# Patient Record
Sex: FEMALE | Race: WHITE | Employment: OTHER | URBAN - NONMETROPOLITAN AREA
[De-identification: names, ages, dates, MRNs, and addresses within clinical notes are randomized per-mention and may not be internally consistent; named-entity substitution may affect disease eponyms.]

---

## 2017-06-03 PROBLEM — I48.91 ATRIAL FIBRILLATION (HCC): Status: ACTIVE | Noted: 2017-06-03

## 2017-06-03 PROBLEM — I10 ESSENTIAL HYPERTENSION: Status: ACTIVE | Noted: 2017-06-03

## 2017-06-03 PROBLEM — E78.5 HLD (HYPERLIPIDEMIA): Status: ACTIVE | Noted: 2017-06-03

## 2017-06-03 PROBLEM — E03.9 HYPOTHYROIDISM: Status: ACTIVE | Noted: 2017-06-03

## 2017-06-03 PROBLEM — R07.9 CHEST PAIN: Status: ACTIVE | Noted: 2017-06-03

## 2017-06-04 PROBLEM — I48.19 PERSISTENT ATRIAL FIBRILLATION (HCC): Status: ACTIVE | Noted: 2017-06-03

## 2017-06-22 PROBLEM — E78.5 HLD (HYPERLIPIDEMIA): Chronic | Status: ACTIVE | Noted: 2017-06-03

## 2017-06-22 PROBLEM — I10 ESSENTIAL HYPERTENSION: Chronic | Status: ACTIVE | Noted: 2017-06-03

## 2017-06-22 PROBLEM — I48.19 PERSISTENT ATRIAL FIBRILLATION (HCC): Chronic | Status: ACTIVE | Noted: 2017-06-03

## 2018-07-19 ENCOUNTER — OFFICE VISIT (OUTPATIENT)
Dept: CARDIOLOGY CLINIC | Age: 83
End: 2018-07-19
Payer: MEDICARE

## 2018-07-19 VITALS
WEIGHT: 174 LBS | HEART RATE: 84 BPM | BODY MASS INDEX: 27.31 KG/M2 | HEIGHT: 67 IN | RESPIRATION RATE: 16 BRPM | DIASTOLIC BLOOD PRESSURE: 78 MMHG | SYSTOLIC BLOOD PRESSURE: 118 MMHG

## 2018-07-19 DIAGNOSIS — I50.33 ACUTE ON CHRONIC DIASTOLIC CONGESTIVE HEART FAILURE (HCC): Primary | ICD-10-CM

## 2018-07-19 DIAGNOSIS — I48.19 PERSISTENT ATRIAL FIBRILLATION (HCC): Chronic | ICD-10-CM

## 2018-07-19 PROCEDURE — 4040F PNEUMOC VAC/ADMIN/RCVD: CPT | Performed by: INTERNAL MEDICINE

## 2018-07-19 PROCEDURE — 93000 ELECTROCARDIOGRAM COMPLETE: CPT | Performed by: INTERNAL MEDICINE

## 2018-07-19 PROCEDURE — 1101F PT FALLS ASSESS-DOCD LE1/YR: CPT | Performed by: INTERNAL MEDICINE

## 2018-07-19 PROCEDURE — 1036F TOBACCO NON-USER: CPT | Performed by: INTERNAL MEDICINE

## 2018-07-19 PROCEDURE — G8427 DOCREV CUR MEDS BY ELIG CLIN: HCPCS | Performed by: INTERNAL MEDICINE

## 2018-07-19 PROCEDURE — G8417 CALC BMI ABV UP PARAM F/U: HCPCS | Performed by: INTERNAL MEDICINE

## 2018-07-19 PROCEDURE — 1123F ACP DISCUSS/DSCN MKR DOCD: CPT | Performed by: INTERNAL MEDICINE

## 2018-07-19 PROCEDURE — 99214 OFFICE O/P EST MOD 30 MIN: CPT | Performed by: INTERNAL MEDICINE

## 2018-07-19 PROCEDURE — G8400 PT W/DXA NO RESULTS DOC: HCPCS | Performed by: INTERNAL MEDICINE

## 2018-07-19 PROCEDURE — 1090F PRES/ABSN URINE INCON ASSESS: CPT | Performed by: INTERNAL MEDICINE

## 2018-07-19 RX ORDER — LORAZEPAM 0.5 MG/1
TABLET ORAL
Refills: 3 | COMMUNITY
Start: 2018-06-25 | End: 2018-07-19

## 2018-07-19 RX ORDER — SPIRONOLACTONE 25 MG/1
25 TABLET ORAL DAILY
Qty: 90 TABLET | Refills: 3
Start: 2018-07-19 | End: 2019-06-20 | Stop reason: ALTCHOICE

## 2018-07-19 NOTE — PROGRESS NOTES
Atrial fibrillation ([facility]     Hyperlipidemia     Hypertension     Hypothyroidism        Patient Active Problem List   Diagnosis    Chest pain    Persistent atrial fibrillation ([facility]    Essential hypertension    HLD (hyperlipidemia)    Hypothyroidism    Acute on chronic diastolic heart failure (HCC)    Pure hypercholesterolemia    Hyperthyroidism    Paced rhythm on electrocardiogram (ECG)       Allergies   Allergen Reactions    Amiodarone Other (See Comments)     Causes issues with liver enzymes. Current Outpatient Prescriptions   Medication Sig Dispense Refill    metoprolol succinate (TOPROL XL) 50 MG extended release tablet TAKE ONE TABLET BY MOUTH TWO TIMES A  tablet 0    levothyroxine (SYNTHROID) 125 MCG tablet Take 125 mcg by mouth Daily      diltiazem (CARDIZEM CD) 360 MG extended release capsule Take 1 capsule by mouth daily 90 capsule 3    apixaban (ELIQUIS) 5 MG TABS tablet Take 1 tablet by mouth 2 times daily 180 tablet 3    clopidogrel (PLAVIX) 75 MG tablet Take 1 tablet by mouth daily 90 tablet 3    pravastatin (PRAVACHOL) 40 MG tablet Take 1 tablet by mouth daily 90 tablet 3    furosemide (LASIX) 20 MG tablet Take 1 tablet by mouth daily 60 tablet 3    vitamin D (CHOLECALCIFEROL) 400 UNITS TABS tablet Take 4,000 Units by mouth daily       LORazepam (ATIVAN) 0.5 MG tablet TAKE ONE TABLET BY MOUTH DAILY AS NEEDED  3     No current facility-administered medications for this visit. Social History     Social History    Marital status:      Spouse name: N/A    Number of children: N/A    Years of education: N/A     Occupational History    Not on file. Social History Main Topics    Smoking status: Never Smoker    Smokeless tobacco: Never Used    Alcohol use No    Drug use: No    Sexual activity: Not on file     Other Topics Concern    Not on file     Social History Narrative    No narrative on file       History reviewed.  No pertinent family

## 2018-07-20 RX ORDER — FUROSEMIDE 20 MG/1
20 TABLET ORAL DAILY
Qty: 90 TABLET | Refills: 3 | Status: SHIPPED | OUTPATIENT
Start: 2018-07-20 | End: 2018-12-20 | Stop reason: SDUPTHER

## 2018-07-20 RX ORDER — METOPROLOL SUCCINATE 50 MG/1
50 TABLET, EXTENDED RELEASE ORAL 2 TIMES DAILY
Qty: 180 TABLET | Refills: 3 | Status: SHIPPED | OUTPATIENT
Start: 2018-07-20 | End: 2018-12-20 | Stop reason: DRUGHIGH

## 2018-07-20 RX ORDER — DILTIAZEM HYDROCHLORIDE 360 MG/1
360 CAPSULE, EXTENDED RELEASE ORAL DAILY
Qty: 90 CAPSULE | Refills: 3 | Status: SHIPPED | OUTPATIENT
Start: 2018-07-20 | End: 2019-06-20 | Stop reason: ALTCHOICE

## 2018-07-20 RX ORDER — PRAVASTATIN SODIUM 40 MG
40 TABLET ORAL DAILY
Qty: 90 TABLET | Refills: 3 | Status: SHIPPED | OUTPATIENT
Start: 2018-07-20 | End: 2019-07-20 | Stop reason: SDUPTHER

## 2018-09-18 ENCOUNTER — OFFICE VISIT (OUTPATIENT)
Dept: CARDIOLOGY CLINIC | Age: 83
End: 2018-09-18
Payer: MEDICARE

## 2018-09-18 VITALS
WEIGHT: 175 LBS | HEIGHT: 67 IN | BODY MASS INDEX: 27.47 KG/M2 | DIASTOLIC BLOOD PRESSURE: 78 MMHG | HEART RATE: 90 BPM | SYSTOLIC BLOOD PRESSURE: 118 MMHG

## 2018-09-18 DIAGNOSIS — I48.19 PERSISTENT ATRIAL FIBRILLATION (HCC): Primary | Chronic | ICD-10-CM

## 2018-09-18 PROCEDURE — 93000 ELECTROCARDIOGRAM COMPLETE: CPT | Performed by: INTERNAL MEDICINE

## 2018-09-18 PROCEDURE — 1101F PT FALLS ASSESS-DOCD LE1/YR: CPT | Performed by: INTERNAL MEDICINE

## 2018-09-18 PROCEDURE — 1036F TOBACCO NON-USER: CPT | Performed by: INTERNAL MEDICINE

## 2018-09-18 PROCEDURE — G8428 CUR MEDS NOT DOCUMENT: HCPCS | Performed by: INTERNAL MEDICINE

## 2018-09-18 PROCEDURE — G8400 PT W/DXA NO RESULTS DOC: HCPCS | Performed by: INTERNAL MEDICINE

## 2018-09-18 PROCEDURE — 4040F PNEUMOC VAC/ADMIN/RCVD: CPT | Performed by: INTERNAL MEDICINE

## 2018-09-18 PROCEDURE — G8417 CALC BMI ABV UP PARAM F/U: HCPCS | Performed by: INTERNAL MEDICINE

## 2018-09-18 PROCEDURE — 1090F PRES/ABSN URINE INCON ASSESS: CPT | Performed by: INTERNAL MEDICINE

## 2018-09-18 PROCEDURE — 99214 OFFICE O/P EST MOD 30 MIN: CPT | Performed by: INTERNAL MEDICINE

## 2018-09-18 PROCEDURE — 1123F ACP DISCUSS/DSCN MKR DOCD: CPT | Performed by: INTERNAL MEDICINE

## 2018-09-18 RX ORDER — LORAZEPAM 0.5 MG/1
TABLET ORAL
Refills: 3 | COMMUNITY
Start: 2018-08-31 | End: 2018-09-18

## 2018-09-18 NOTE — PROGRESS NOTES
history. Review of Systems:  Heart: as above   Lungs: as above   Eyes: denies changes in vision or discharge. Ears: denies changes in hearing or pain. Nose: denies epistaxis or masses   Throat: denies sore throat or trouble swallowing. Neuro: denies numbness, tingling, tremors. Skin: denies rashes or itching. : denies hematuria, dysuria   GI: denies vomiting, diarrhea   Psych: denies mood changed, anxiety, depression. All other systems negative. Physical Exam   /78   Pulse 90   Ht 5' 7\" (1.702 m)   Wt 175 lb (79.4 kg)   BMI 27.41 kg/m²   Constitutional: Oriented to person, place, and time. Well-developed and well-nourished. No distress. Head: Normocephalic and atraumatic. Eyes: EOM are normal. Pupils are equal, round, and reactive to light. Neck: Normal range of motion. Neck supple. No hepatojugular reflux and no JVD present. Carotid bruit is not present. No tracheal deviation present. No thyromegaly present. Cardiovascular: Normal rate, irregular rhythm, normal heart sounds and intact distal pulses. Exam reveals no gallop and no friction rub. No murmur heard. Pulmonary/Chest: Effort normal and breath sounds normal. No respiratory distress. No wheezes. No rales. No tenderness. Abdominal: Soft. Bowel sounds are normal. No distension and no mass. No tenderness. No rebound and no guarding. Musculoskeletal: Normal range of motion. No edema and no tenderness. Lymphadenopathy:   No cervical adenopathy. No groin adenopathy. Neurological: Alert and oriented to person, place, and time. Skin: Skin is warm and dry. No rash noted. Not diaphoretic. No erythema. Psychiatric: Normal mood and affect. Behavior is normal.     EKG:  atrial fibrillation, V paced. Cath Findings 6/26/17:   Left main: 0% stenosis  LAD: mild, calcific disease  Circumflex: mild disease   RCA: Dominant. 80 % mid stenosis. Hemodynamics: Ao: 115/63  Interventional procedure:   1. PCI vessel: RCA.  Lesion

## 2018-12-18 ENCOUNTER — TELEPHONE (OUTPATIENT)
Dept: CARDIOLOGY CLINIC | Age: 83
End: 2018-12-18

## 2018-12-20 ENCOUNTER — OFFICE VISIT (OUTPATIENT)
Dept: CARDIOLOGY CLINIC | Age: 83
End: 2018-12-20
Payer: MEDICARE

## 2018-12-20 VITALS
BODY MASS INDEX: 26.67 KG/M2 | RESPIRATION RATE: 20 BRPM | SYSTOLIC BLOOD PRESSURE: 118 MMHG | WEIGHT: 176 LBS | HEART RATE: 84 BPM | DIASTOLIC BLOOD PRESSURE: 72 MMHG | HEIGHT: 68 IN

## 2018-12-20 DIAGNOSIS — I10 ESSENTIAL HYPERTENSION: Chronic | ICD-10-CM

## 2018-12-20 DIAGNOSIS — I48.19 PERSISTENT ATRIAL FIBRILLATION (HCC): Primary | Chronic | ICD-10-CM

## 2018-12-20 PROCEDURE — G8417 CALC BMI ABV UP PARAM F/U: HCPCS | Performed by: INTERNAL MEDICINE

## 2018-12-20 PROCEDURE — G8484 FLU IMMUNIZE NO ADMIN: HCPCS | Performed by: INTERNAL MEDICINE

## 2018-12-20 PROCEDURE — G8427 DOCREV CUR MEDS BY ELIG CLIN: HCPCS | Performed by: INTERNAL MEDICINE

## 2018-12-20 PROCEDURE — 1101F PT FALLS ASSESS-DOCD LE1/YR: CPT | Performed by: INTERNAL MEDICINE

## 2018-12-20 PROCEDURE — G8400 PT W/DXA NO RESULTS DOC: HCPCS | Performed by: INTERNAL MEDICINE

## 2018-12-20 PROCEDURE — 1123F ACP DISCUSS/DSCN MKR DOCD: CPT | Performed by: INTERNAL MEDICINE

## 2018-12-20 PROCEDURE — 93000 ELECTROCARDIOGRAM COMPLETE: CPT | Performed by: INTERNAL MEDICINE

## 2018-12-20 PROCEDURE — 1090F PRES/ABSN URINE INCON ASSESS: CPT | Performed by: INTERNAL MEDICINE

## 2018-12-20 PROCEDURE — 1036F TOBACCO NON-USER: CPT | Performed by: INTERNAL MEDICINE

## 2018-12-20 PROCEDURE — 99214 OFFICE O/P EST MOD 30 MIN: CPT | Performed by: INTERNAL MEDICINE

## 2018-12-20 PROCEDURE — 4040F PNEUMOC VAC/ADMIN/RCVD: CPT | Performed by: INTERNAL MEDICINE

## 2018-12-20 RX ORDER — FUROSEMIDE 20 MG/1
40 TABLET ORAL DAILY
Qty: 180 TABLET | Refills: 3 | Status: SHIPPED | OUTPATIENT
Start: 2018-12-20 | End: 2020-03-03 | Stop reason: SDUPTHER

## 2018-12-20 RX ORDER — METOPROLOL SUCCINATE 50 MG/1
50 TABLET, EXTENDED RELEASE ORAL 3 TIMES DAILY
Qty: 270 TABLET | Refills: 3 | Status: SHIPPED | OUTPATIENT
Start: 2018-12-20 | End: 2018-12-27 | Stop reason: SDUPTHER

## 2018-12-20 NOTE — PATIENT INSTRUCTIONS
1. Increase furosemide to two pills daily. 2. Increase metoprolol XL to one pill three times daily. 3. Get blood work done in about a week.   4. We will arrange for you to see an irregular heart beat specialist.

## 2018-12-21 ENCOUNTER — TELEPHONE (OUTPATIENT)
Dept: ADMINISTRATIVE | Age: 83
End: 2018-12-21

## 2018-12-21 NOTE — PROGRESS NOTES
7. Pharmacologic stress test, 06/03/2017. Ejection fraction 77%. No evidence for stress induced left ventricular myocardial ischemia. 8. Mild bilateral carotid vascular disease by ultrasound, 06/24/2014 (less than 50% internal carotid artery stenoses bilaterally). 9. [id number] score  = 4. Review of Systems:  HEENT: negative for acute visual and auditory problems  Constitutional: Patient does feel poorly, but denies palpitations or syncope. Please note the description above. Respiratory: negative for cough and hemoptysis  Cardiovascular: negative for chest pain and dyspnea  Gastrointestinal: negative for abdominal pain, diarrhea, nausea and vomiting  Genitourinary: negative for dysuria and hematuria  Derm: negative for rash and skin lesion(s)  Neurological: negative for seizures and tremors  Endocrine: negative for diabetic symptoms including polydipsia and polyuria  Musculoskeletal: negative for CTD  Psychiatric: negative for anxiety and major depression. The remainder of the review of systems is negative except as noted above. On exam today, she is an overweight white female who is awake, alert and oriented. P: 84 and irregularly irregular. BP: 118/74. Wt. 176 lbs. , which is stable from 09/2018. Oxygen saturation on room air is 97%. Her neck is supple. She has jugular venous distention with hepatojugular reflux when sitting at a 45 degree angle. Carotids are full. Respirations are unlabored. Her chest is clear. She does have a few rales at the bases. She has no presacral edema. Her heart has an irregularly irregular rhythm. There is no murmur or gallop. Her abdomen is obese, but otherwise benign. Extremities showed 2+ pitting edema bilaterally and she is wearing support stockings. Her electrocardiogram shows atrial fibrillation with an occasional paced beat. She has poor precordial R wave progression. No acute changes seen.      Her atrial fibrillation appears to be

## 2018-12-27 ENCOUNTER — TELEPHONE (OUTPATIENT)
Dept: CARDIOLOGY CLINIC | Age: 83
End: 2018-12-27

## 2018-12-27 DIAGNOSIS — I48.19 PERSISTENT ATRIAL FIBRILLATION (HCC): Chronic | ICD-10-CM

## 2018-12-27 LAB
BUN BLDV-MCNC: 29 MG/DL (ref 7–24)
CALCIUM SERPL-MCNC: 9 MG/DL (ref 8.5–10.5)
CHLORIDE BLD-SCNC: 101 MMOL/L (ref 98–107)
CO2: 30 MMOL/L (ref 21–32)
CREAT SERPL-MCNC: 1.76 MG/DL (ref 0.55–1.02)
GFR AFRICAN AMERICAN: 34 ML/MIN
GFR SERPL CREATININE-BSD FRML MDRD: 28 ML/MIN/
GLUCOSE: 111 MG/DL (ref 65–99)
POTASSIUM SERPL-SCNC: 3.6 MMOL/L (ref 3.5–5.1)
SODIUM BLD-SCNC: 140 MMOL/L (ref 136–145)

## 2018-12-27 RX ORDER — METOPROLOL SUCCINATE 100 MG/1
100 TABLET, EXTENDED RELEASE ORAL 2 TIMES DAILY
Qty: 180 TABLET | Refills: 3 | Status: SHIPPED | OUTPATIENT
Start: 2018-12-27 | End: 2019-08-07 | Stop reason: SDUPTHER

## 2018-12-27 NOTE — TELEPHONE ENCOUNTER
I talked to Stephanie Cruz's daughter and she said Hayde Hare said she is not feeling any better. I was not able to get her an appt at  [facility] until [facility]. Do you think she can wait till then or should I call back and talk to actual office not the scheduling office.

## 2019-02-07 ENCOUNTER — TELEPHONE (OUTPATIENT)
Dept: NON INVASIVE DIAGNOSTICS | Age: 84
End: 2019-02-07

## 2019-02-07 ENCOUNTER — OFFICE VISIT (OUTPATIENT)
Dept: NON INVASIVE DIAGNOSTICS | Age: 84
End: 2019-02-07
Payer: MEDICARE

## 2019-02-07 VITALS
WEIGHT: 175 LBS | RESPIRATION RATE: 18 BRPM | HEIGHT: 64 IN | DIASTOLIC BLOOD PRESSURE: 60 MMHG | SYSTOLIC BLOOD PRESSURE: 116 MMHG | HEART RATE: 85 BPM | BODY MASS INDEX: 29.88 KG/M2

## 2019-02-07 DIAGNOSIS — Z95.0 PACEMAKER: Primary | ICD-10-CM

## 2019-02-07 DIAGNOSIS — R06.02 SOB (SHORTNESS OF BREATH): ICD-10-CM

## 2019-02-07 DIAGNOSIS — I50.33 ACUTE ON CHRONIC DIASTOLIC HEART FAILURE (HCC): ICD-10-CM

## 2019-02-07 PROCEDURE — G8484 FLU IMMUNIZE NO ADMIN: HCPCS | Performed by: INTERNAL MEDICINE

## 2019-02-07 PROCEDURE — 99205 OFFICE O/P NEW HI 60 MIN: CPT | Performed by: INTERNAL MEDICINE

## 2019-02-07 PROCEDURE — 93280 PM DEVICE PROGR EVAL DUAL: CPT | Performed by: INTERNAL MEDICINE

## 2019-02-07 PROCEDURE — 1101F PT FALLS ASSESS-DOCD LE1/YR: CPT | Performed by: INTERNAL MEDICINE

## 2019-02-07 PROCEDURE — 1123F ACP DISCUSS/DSCN MKR DOCD: CPT | Performed by: INTERNAL MEDICINE

## 2019-02-07 PROCEDURE — 1036F TOBACCO NON-USER: CPT | Performed by: INTERNAL MEDICINE

## 2019-02-07 PROCEDURE — G8417 CALC BMI ABV UP PARAM F/U: HCPCS | Performed by: INTERNAL MEDICINE

## 2019-02-07 PROCEDURE — G8400 PT W/DXA NO RESULTS DOC: HCPCS | Performed by: INTERNAL MEDICINE

## 2019-02-07 PROCEDURE — 4040F PNEUMOC VAC/ADMIN/RCVD: CPT | Performed by: INTERNAL MEDICINE

## 2019-02-07 PROCEDURE — G8427 DOCREV CUR MEDS BY ELIG CLIN: HCPCS | Performed by: INTERNAL MEDICINE

## 2019-02-07 PROCEDURE — 1090F PRES/ABSN URINE INCON ASSESS: CPT | Performed by: INTERNAL MEDICINE

## 2019-02-07 RX ORDER — LORAZEPAM 0.5 MG/1
TABLET ORAL
Refills: 3 | COMMUNITY
Start: 2019-01-10

## 2019-02-18 ENCOUNTER — TELEPHONE (OUTPATIENT)
Dept: NON INVASIVE DIAGNOSTICS | Age: 84
End: 2019-02-18

## 2019-03-11 ENCOUNTER — HOSPITAL ENCOUNTER (OUTPATIENT)
Dept: GENERAL RADIOLOGY | Age: 84
Discharge: HOME | End: 2019-03-13
Attending: INTERNAL MEDICINE
Payer: MEDICARE

## 2019-03-11 ENCOUNTER — ANESTHESIA (OUTPATIENT)
Dept: CARDIAC CATH/INVASIVE PROCEDURES | Age: 84
End: 2019-03-11

## 2019-03-11 ENCOUNTER — HOSPITAL ENCOUNTER (OUTPATIENT)
Dept: CARDIAC CATH/INVASIVE PROCEDURES | Age: 84
Setting detail: OBSERVATION
Discharge: HOME | End: 2019-03-12
Attending: INTERNAL MEDICINE | Admitting: INTERNAL MEDICINE
Payer: MEDICARE

## 2019-03-11 ENCOUNTER — ANESTHESIA EVENT (OUTPATIENT)
Dept: CARDIAC CATH/INVASIVE PROCEDURES | Age: 84
End: 2019-03-11

## 2019-03-11 VITALS
SYSTOLIC BLOOD PRESSURE: 96 MMHG | OXYGEN SATURATION: 98 % | DIASTOLIC BLOOD PRESSURE: 64 MMHG | RESPIRATION RATE: 23 BRPM

## 2019-03-11 DIAGNOSIS — Z95.0 PACEMAKER: ICD-10-CM

## 2019-03-11 LAB
ANION GAP SERPL CALCULATED.3IONS-SCNC: 12 MMOL/L (ref 7–16)
BASOPHILS ABSOLUTE: 0.13 E9/L (ref 0–0.2)
BASOPHILS RELATIVE PERCENT: 1 % (ref 0–2)
BUN BLDV-MCNC: 28 MG/DL (ref 8–23)
CALCIUM SERPL-MCNC: 9.6 MG/DL (ref 8.6–10.2)
CHLORIDE BLD-SCNC: 99 MMOL/L (ref 98–107)
CO2: 27 MMOL/L (ref 22–29)
CREAT SERPL-MCNC: 1.6 MG/DL (ref 0.5–1)
EOSINOPHILS ABSOLUTE: 0.92 E9/L (ref 0.05–0.5)
EOSINOPHILS RELATIVE PERCENT: 7.2 % (ref 0–6)
GFR AFRICAN AMERICAN: 37
GFR NON-AFRICAN AMERICAN: 31 ML/MIN/1.73
GLUCOSE BLD-MCNC: 100 MG/DL (ref 74–99)
HCT VFR BLD CALC: 42.8 % (ref 34–48)
HEMOGLOBIN: 14.7 G/DL (ref 11.5–15.5)
IMMATURE GRANULOCYTES #: 0.17 E9/L
IMMATURE GRANULOCYTES %: 1.3 % (ref 0–5)
LYMPHOCYTES ABSOLUTE: 2.07 E9/L (ref 1.5–4)
LYMPHOCYTES RELATIVE PERCENT: 16.3 % (ref 20–42)
MCH RBC QN AUTO: 35.1 PG (ref 26–35)
MCHC RBC AUTO-ENTMCNC: 34.3 % (ref 32–34.5)
MCV RBC AUTO: 102.1 FL (ref 80–99.9)
MONOCYTES ABSOLUTE: 1.18 E9/L (ref 0.1–0.95)
MONOCYTES RELATIVE PERCENT: 9.3 % (ref 2–12)
NEUTROPHILS ABSOLUTE: 8.24 E9/L (ref 1.8–7.3)
NEUTROPHILS RELATIVE PERCENT: 64.9 % (ref 43–80)
PDW BLD-RTO: 12.5 FL (ref 11.5–15)
PLATELET # BLD: 258 E9/L (ref 130–450)
PMV BLD AUTO: 10.1 FL (ref 7–12)
POTASSIUM SERPL-SCNC: 3.7 MMOL/L (ref 3.5–5)
RBC # BLD: 4.19 E12/L (ref 3.5–5.5)
SODIUM BLD-SCNC: 138 MMOL/L (ref 132–146)
WBC # BLD: 12.7 E9/L (ref 4.5–11.5)

## 2019-03-11 PROCEDURE — C1769 GUIDE WIRE: HCPCS

## 2019-03-11 PROCEDURE — 33229 REMV&REPLC PM GEN MULT LEADS: CPT | Performed by: INTERNAL MEDICINE

## 2019-03-11 PROCEDURE — 6360000002 HC RX W HCPCS: Performed by: NURSE ANESTHETIST, CERTIFIED REGISTERED

## 2019-03-11 PROCEDURE — 33225 L VENTRIC PACING LEAD ADD-ON: CPT | Performed by: INTERNAL MEDICINE

## 2019-03-11 PROCEDURE — G0378 HOSPITAL OBSERVATION PER HR: HCPCS

## 2019-03-11 PROCEDURE — 3700000001 HC ADD 15 MINUTES (ANESTHESIA)

## 2019-03-11 PROCEDURE — C1900 LEAD, CORONARY VENOUS: HCPCS

## 2019-03-11 PROCEDURE — 2580000003 HC RX 258

## 2019-03-11 PROCEDURE — 2580000003 HC RX 258: Performed by: INTERNAL MEDICINE

## 2019-03-11 PROCEDURE — 6360000002 HC RX W HCPCS: Performed by: INTERNAL MEDICINE

## 2019-03-11 PROCEDURE — 2580000003 HC RX 258: Performed by: NURSE ANESTHETIST, CERTIFIED REGISTERED

## 2019-03-11 PROCEDURE — C1781 MESH (IMPLANTABLE): HCPCS

## 2019-03-11 PROCEDURE — 6360000002 HC RX W HCPCS

## 2019-03-11 PROCEDURE — 36415 COLL VENOUS BLD VENIPUNCTURE: CPT

## 2019-03-11 PROCEDURE — C1893 INTRO/SHEATH, FIXED,NON-PEEL: HCPCS

## 2019-03-11 PROCEDURE — C1730 CATH, EP, 19 OR FEW ELECT: HCPCS

## 2019-03-11 PROCEDURE — C1894 INTRO/SHEATH, NON-LASER: HCPCS

## 2019-03-11 PROCEDURE — C1725 CATH, TRANSLUMIN NON-LASER: HCPCS

## 2019-03-11 PROCEDURE — 2709999900 HC NON-CHARGEABLE SUPPLY

## 2019-03-11 PROCEDURE — 71045 X-RAY EXAM CHEST 1 VIEW: CPT

## 2019-03-11 PROCEDURE — 3700000000 HC ANESTHESIA ATTENDED CARE

## 2019-03-11 PROCEDURE — 6370000000 HC RX 637 (ALT 250 FOR IP): Performed by: INTERNAL MEDICINE

## 2019-03-11 PROCEDURE — 85025 COMPLETE CBC W/AUTO DIFF WBC: CPT

## 2019-03-11 PROCEDURE — 2500000003 HC RX 250 WO HCPCS

## 2019-03-11 PROCEDURE — 93005 ELECTROCARDIOGRAM TRACING: CPT | Performed by: INTERNAL MEDICINE

## 2019-03-11 PROCEDURE — C2621 PMKR, OTHER THAN SING/DUAL: HCPCS

## 2019-03-11 PROCEDURE — 2720000010 HC SURG SUPPLY STERILE

## 2019-03-11 PROCEDURE — 80048 BASIC METABOLIC PNL TOTAL CA: CPT

## 2019-03-11 RX ORDER — DILTIAZEM HYDROCHLORIDE 180 MG/1
360 CAPSULE, COATED, EXTENDED RELEASE ORAL DAILY
Status: DISCONTINUED | OUTPATIENT
Start: 2019-03-12 | End: 2019-03-12 | Stop reason: HOSPADM

## 2019-03-11 RX ORDER — SODIUM CHLORIDE 9 MG/ML
INJECTION, SOLUTION INTRAVENOUS CONTINUOUS PRN
Status: DISCONTINUED | OUTPATIENT
Start: 2019-03-11 | End: 2019-03-11 | Stop reason: SDUPTHER

## 2019-03-11 RX ORDER — PROPOFOL 10 MG/ML
INJECTION, EMULSION INTRAVENOUS CONTINUOUS PRN
Status: DISCONTINUED | OUTPATIENT
Start: 2019-03-11 | End: 2019-03-11 | Stop reason: SDUPTHER

## 2019-03-11 RX ORDER — MIDAZOLAM HYDROCHLORIDE 1 MG/ML
INJECTION INTRAMUSCULAR; INTRAVENOUS PRN
Status: DISCONTINUED | OUTPATIENT
Start: 2019-03-11 | End: 2019-03-11 | Stop reason: SDUPTHER

## 2019-03-11 RX ORDER — METOPROLOL SUCCINATE 100 MG/1
100 TABLET, EXTENDED RELEASE ORAL 2 TIMES DAILY
Status: DISCONTINUED | OUTPATIENT
Start: 2019-03-11 | End: 2019-03-12 | Stop reason: HOSPADM

## 2019-03-11 RX ORDER — SODIUM CHLORIDE 9 MG/ML
INJECTION, SOLUTION INTRAVENOUS ONCE
Status: COMPLETED | OUTPATIENT
Start: 2019-03-11 | End: 2019-03-11

## 2019-03-11 RX ORDER — SODIUM CHLORIDE 0.9 % (FLUSH) 0.9 %
10 SYRINGE (ML) INJECTION EVERY 12 HOURS SCHEDULED
Status: DISCONTINUED | OUTPATIENT
Start: 2019-03-11 | End: 2019-03-12 | Stop reason: HOSPADM

## 2019-03-11 RX ORDER — PROPOFOL 10 MG/ML
INJECTION, EMULSION INTRAVENOUS PRN
Status: DISCONTINUED | OUTPATIENT
Start: 2019-03-11 | End: 2019-03-11 | Stop reason: SDUPTHER

## 2019-03-11 RX ORDER — LEVOTHYROXINE SODIUM 0.12 MG/1
125 TABLET ORAL DAILY
Status: DISCONTINUED | OUTPATIENT
Start: 2019-03-12 | End: 2019-03-12 | Stop reason: HOSPADM

## 2019-03-11 RX ORDER — ACETAMINOPHEN 325 MG/1
650 TABLET ORAL EVERY 4 HOURS PRN
Status: DISCONTINUED | OUTPATIENT
Start: 2019-03-11 | End: 2019-03-12 | Stop reason: HOSPADM

## 2019-03-11 RX ORDER — SPIRONOLACTONE 25 MG/1
25 TABLET ORAL DAILY
Status: DISCONTINUED | OUTPATIENT
Start: 2019-03-11 | End: 2019-03-12 | Stop reason: HOSPADM

## 2019-03-11 RX ORDER — SODIUM CHLORIDE 0.9 % (FLUSH) 0.9 %
10 SYRINGE (ML) INJECTION PRN
Status: DISCONTINUED | OUTPATIENT
Start: 2019-03-11 | End: 2019-03-12 | Stop reason: HOSPADM

## 2019-03-11 RX ORDER — PRAVASTATIN SODIUM 20 MG
40 TABLET ORAL DAILY
Status: DISCONTINUED | OUTPATIENT
Start: 2019-03-11 | End: 2019-03-12 | Stop reason: HOSPADM

## 2019-03-11 RX ORDER — CHOLECALCIFEROL (VITAMIN D3) 50 MCG
4000 TABLET ORAL DAILY
Status: DISCONTINUED | OUTPATIENT
Start: 2019-03-11 | End: 2019-03-12 | Stop reason: HOSPADM

## 2019-03-11 RX ORDER — LORAZEPAM 0.5 MG/1
0.5 TABLET ORAL NIGHTLY PRN
Status: DISCONTINUED | OUTPATIENT
Start: 2019-03-11 | End: 2019-03-12 | Stop reason: HOSPADM

## 2019-03-11 RX ORDER — FENTANYL CITRATE 50 UG/ML
INJECTION, SOLUTION INTRAMUSCULAR; INTRAVENOUS PRN
Status: DISCONTINUED | OUTPATIENT
Start: 2019-03-11 | End: 2019-03-11 | Stop reason: SDUPTHER

## 2019-03-11 RX ORDER — FUROSEMIDE 40 MG/1
40 TABLET ORAL DAILY
Status: DISCONTINUED | OUTPATIENT
Start: 2019-03-11 | End: 2019-03-12 | Stop reason: HOSPADM

## 2019-03-11 RX ORDER — CEFAZOLIN SODIUM 1 G/3ML
INJECTION, POWDER, FOR SOLUTION INTRAMUSCULAR; INTRAVENOUS PRN
Status: DISCONTINUED | OUTPATIENT
Start: 2019-03-11 | End: 2019-03-11 | Stop reason: SDUPTHER

## 2019-03-11 RX ADMIN — SODIUM CHLORIDE: 9 INJECTION, SOLUTION INTRAVENOUS at 09:25

## 2019-03-11 RX ADMIN — PROPOFOL 20 MG: 10 INJECTION, EMULSION INTRAVENOUS at 12:05

## 2019-03-11 RX ADMIN — SODIUM CHLORIDE: 9 INJECTION, SOLUTION INTRAVENOUS at 10:15

## 2019-03-11 RX ADMIN — CEFAZOLIN 2000 MG: 1 INJECTION, POWDER, FOR SOLUTION INTRAMUSCULAR; INTRAVENOUS at 10:27

## 2019-03-11 RX ADMIN — PROPOFOL 25 MCG/KG/MIN: 10 INJECTION, EMULSION INTRAVENOUS at 10:25

## 2019-03-11 RX ADMIN — SODIUM CHLORIDE: 9 INJECTION, SOLUTION INTRAVENOUS at 11:59

## 2019-03-11 RX ADMIN — PROPOFOL 20 MG: 10 INJECTION, EMULSION INTRAVENOUS at 11:55

## 2019-03-11 RX ADMIN — PROPOFOL 30 MG: 10 INJECTION, EMULSION INTRAVENOUS at 12:40

## 2019-03-11 RX ADMIN — MIDAZOLAM HYDROCHLORIDE 1 MG: 1 INJECTION, SOLUTION INTRAMUSCULAR; INTRAVENOUS at 10:20

## 2019-03-11 RX ADMIN — FENTANYL CITRATE 100 MCG: 50 INJECTION, SOLUTION INTRAMUSCULAR; INTRAVENOUS at 10:20

## 2019-03-11 RX ADMIN — CEFAZOLIN 1.5 G: 1 INJECTION, POWDER, FOR SOLUTION INTRAMUSCULAR; INTRAVENOUS at 20:20

## 2019-03-11 RX ADMIN — METOPROLOL SUCCINATE 100 MG: 100 TABLET, EXTENDED RELEASE ORAL at 20:25

## 2019-03-11 RX ADMIN — FENTANYL CITRATE 50 MCG: 50 INJECTION, SOLUTION INTRAMUSCULAR; INTRAVENOUS at 11:55

## 2019-03-11 RX ADMIN — Medication 10 ML: at 21:20

## 2019-03-11 ASSESSMENT — PULMONARY FUNCTION TESTS
PIF_VALUE: 13
PIF_VALUE: 14
PIF_VALUE: 18
PIF_VALUE: 14
PIF_VALUE: 13
PIF_VALUE: 14
PIF_VALUE: 13
PIF_VALUE: 15
PIF_VALUE: 13
PIF_VALUE: 15
PIF_VALUE: 15
PIF_VALUE: 0
PIF_VALUE: 13
PIF_VALUE: 14
PIF_VALUE: 15
PIF_VALUE: 12
PIF_VALUE: 14
PIF_VALUE: 11
PIF_VALUE: 14
PIF_VALUE: 14
PIF_VALUE: 15
PIF_VALUE: 14
PIF_VALUE: 13
PIF_VALUE: 14
PIF_VALUE: 13
PIF_VALUE: 13
PIF_VALUE: 29
PIF_VALUE: 13
PIF_VALUE: 13
PIF_VALUE: 0
PIF_VALUE: 12
PIF_VALUE: 12
PIF_VALUE: 13
PIF_VALUE: 14
PIF_VALUE: 13
PIF_VALUE: 14
PIF_VALUE: 14
PIF_VALUE: 13
PIF_VALUE: 14
PIF_VALUE: 14
PIF_VALUE: 12
PIF_VALUE: 13
PIF_VALUE: 0
PIF_VALUE: 13
PIF_VALUE: 14
PIF_VALUE: 13
PIF_VALUE: 0
PIF_VALUE: 15
PIF_VALUE: 12
PIF_VALUE: 15
PIF_VALUE: 14
PIF_VALUE: 13
PIF_VALUE: 12
PIF_VALUE: 14
PIF_VALUE: 11
PIF_VALUE: 13
PIF_VALUE: 13
PIF_VALUE: 14
PIF_VALUE: 21
PIF_VALUE: 15
PIF_VALUE: 13
PIF_VALUE: 14
PIF_VALUE: 14
PIF_VALUE: 13
PIF_VALUE: 13
PIF_VALUE: 14
PIF_VALUE: 14
PIF_VALUE: 13
PIF_VALUE: 14
PIF_VALUE: 13
PIF_VALUE: 13
PIF_VALUE: 14
PIF_VALUE: 13
PIF_VALUE: 14
PIF_VALUE: 13
PIF_VALUE: 15
PIF_VALUE: 14
PIF_VALUE: 14
PIF_VALUE: 13
PIF_VALUE: 14
PIF_VALUE: 12
PIF_VALUE: 13
PIF_VALUE: 14
PIF_VALUE: 15
PIF_VALUE: 14
PIF_VALUE: 35
PIF_VALUE: 14
PIF_VALUE: 13
PIF_VALUE: 0
PIF_VALUE: 22
PIF_VALUE: 14
PIF_VALUE: 15
PIF_VALUE: 11
PIF_VALUE: 13
PIF_VALUE: 12
PIF_VALUE: 13
PIF_VALUE: 15
PIF_VALUE: 14
PIF_VALUE: 14
PIF_VALUE: 13
PIF_VALUE: 14
PIF_VALUE: 15
PIF_VALUE: 12
PIF_VALUE: 15
PIF_VALUE: 14
PIF_VALUE: 3
PIF_VALUE: 13
PIF_VALUE: 12
PIF_VALUE: 13
PIF_VALUE: 13
PIF_VALUE: 15
PIF_VALUE: 15
PIF_VALUE: 12
PIF_VALUE: 15
PIF_VALUE: 0
PIF_VALUE: 13
PIF_VALUE: 13
PIF_VALUE: 14
PIF_VALUE: 12
PIF_VALUE: 13
PIF_VALUE: 14
PIF_VALUE: 14
PIF_VALUE: 13
PIF_VALUE: 15
PIF_VALUE: 15
PIF_VALUE: 14
PIF_VALUE: 13
PIF_VALUE: 12
PIF_VALUE: 14
PIF_VALUE: 14
PIF_VALUE: 13
PIF_VALUE: 14
PIF_VALUE: 13
PIF_VALUE: 14
PIF_VALUE: 17
PIF_VALUE: 14
PIF_VALUE: 12
PIF_VALUE: 13
PIF_VALUE: 14

## 2019-03-11 ASSESSMENT — ENCOUNTER SYMPTOMS: SHORTNESS OF BREATH: 1

## 2019-03-12 ENCOUNTER — APPOINTMENT (OUTPATIENT)
Dept: GENERAL RADIOLOGY | Age: 84
End: 2019-03-12
Attending: INTERNAL MEDICINE
Payer: MEDICARE

## 2019-03-12 VITALS
DIASTOLIC BLOOD PRESSURE: 69 MMHG | WEIGHT: 171 LBS | TEMPERATURE: 98.6 F | BODY MASS INDEX: 26.84 KG/M2 | OXYGEN SATURATION: 95 % | SYSTOLIC BLOOD PRESSURE: 106 MMHG | HEART RATE: 75 BPM | RESPIRATION RATE: 20 BRPM | HEIGHT: 67 IN

## 2019-03-12 PROBLEM — N18.9 CKD (CHRONIC KIDNEY DISEASE): Status: ACTIVE | Noted: 2019-03-12

## 2019-03-12 LAB
ANION GAP SERPL CALCULATED.3IONS-SCNC: 8 MMOL/L (ref 7–16)
BUN BLDV-MCNC: 19 MG/DL (ref 8–23)
CALCIUM SERPL-MCNC: 8.7 MG/DL (ref 8.6–10.2)
CHLORIDE BLD-SCNC: 102 MMOL/L (ref 98–107)
CO2: 26 MMOL/L (ref 22–29)
CREAT SERPL-MCNC: 1.2 MG/DL (ref 0.5–1)
GFR AFRICAN AMERICAN: 52
GFR NON-AFRICAN AMERICAN: 43 ML/MIN/1.73
GLUCOSE BLD-MCNC: 94 MG/DL (ref 74–99)
HCT VFR BLD CALC: 37.4 % (ref 34–48)
HEMOGLOBIN: 12.6 G/DL (ref 11.5–15.5)
MAGNESIUM: 2.2 MG/DL (ref 1.6–2.6)
MCH RBC QN AUTO: 34.4 PG (ref 26–35)
MCHC RBC AUTO-ENTMCNC: 33.7 % (ref 32–34.5)
MCV RBC AUTO: 102.2 FL (ref 80–99.9)
PDW BLD-RTO: 12.5 FL (ref 11.5–15)
PLATELET # BLD: 200 E9/L (ref 130–450)
PMV BLD AUTO: 10.6 FL (ref 7–12)
POTASSIUM SERPL-SCNC: 4.1 MMOL/L (ref 3.5–5)
RBC # BLD: 3.66 E12/L (ref 3.5–5.5)
SODIUM BLD-SCNC: 136 MMOL/L (ref 132–146)
WBC # BLD: 10.3 E9/L (ref 4.5–11.5)

## 2019-03-12 PROCEDURE — 6370000000 HC RX 637 (ALT 250 FOR IP): Performed by: INTERNAL MEDICINE

## 2019-03-12 PROCEDURE — 2580000003 HC RX 258: Performed by: INTERNAL MEDICINE

## 2019-03-12 PROCEDURE — 85027 COMPLETE CBC AUTOMATED: CPT

## 2019-03-12 PROCEDURE — 83735 ASSAY OF MAGNESIUM: CPT

## 2019-03-12 PROCEDURE — 71045 X-RAY EXAM CHEST 1 VIEW: CPT

## 2019-03-12 PROCEDURE — 6370000000 HC RX 637 (ALT 250 FOR IP): Performed by: NURSE PRACTITIONER

## 2019-03-12 PROCEDURE — 36415 COLL VENOUS BLD VENIPUNCTURE: CPT

## 2019-03-12 PROCEDURE — 93281 PM DEVICE PROGR EVAL MULTI: CPT | Performed by: INTERNAL MEDICINE

## 2019-03-12 PROCEDURE — G0378 HOSPITAL OBSERVATION PER HR: HCPCS

## 2019-03-12 PROCEDURE — 80048 BASIC METABOLIC PNL TOTAL CA: CPT

## 2019-03-12 PROCEDURE — 6360000002 HC RX W HCPCS: Performed by: INTERNAL MEDICINE

## 2019-03-12 RX ADMIN — Medication 4000 UNITS: at 09:19

## 2019-03-12 RX ADMIN — LEVOTHYROXINE SODIUM 125 MCG: 125 TABLET ORAL at 06:49

## 2019-03-12 RX ADMIN — Medication 10 ML: at 09:19

## 2019-03-12 RX ADMIN — METOPROLOL SUCCINATE 100 MG: 100 TABLET, EXTENDED RELEASE ORAL at 09:19

## 2019-03-12 RX ADMIN — SPIRONOLACTONE 25 MG: 25 TABLET ORAL at 09:19

## 2019-03-12 RX ADMIN — DILTIAZEM HYDROCHLORIDE 360 MG: 180 CAPSULE, EXTENDED RELEASE ORAL at 09:19

## 2019-03-12 RX ADMIN — CEFAZOLIN 1.5 G: 1 INJECTION, POWDER, FOR SOLUTION INTRAMUSCULAR; INTRAVENOUS at 03:32

## 2019-03-12 RX ADMIN — FUROSEMIDE 40 MG: 40 TABLET ORAL at 09:19

## 2019-03-12 RX ADMIN — APIXABAN 2.5 MG: 2.5 TABLET, FILM COATED ORAL at 09:19

## 2019-03-12 RX ADMIN — PRAVASTATIN SODIUM 40 MG: 20 TABLET ORAL at 09:19

## 2019-03-12 ASSESSMENT — PAIN SCALES - GENERAL
PAINLEVEL_OUTOF10: 0

## 2019-03-14 LAB
EKG ATRIAL RATE: 72 BPM
EKG Q-T INTERVAL: 462 MS
EKG QRS DURATION: 136 MS
EKG QTC CALCULATION (BAZETT): 515 MS
EKG R AXIS: -172 DEGREES
EKG T AXIS: -4 DEGREES
EKG VENTRICULAR RATE: 75 BPM

## 2019-03-14 PROCEDURE — 93010 ELECTROCARDIOGRAM REPORT: CPT | Performed by: INTERNAL MEDICINE

## 2019-03-25 ENCOUNTER — HOSPITAL ENCOUNTER (OUTPATIENT)
Age: 84
Discharge: HOME | End: 2019-03-27
Payer: MEDICARE

## 2019-03-25 ENCOUNTER — TELEPHONE (OUTPATIENT)
Dept: NON INVASIVE DIAGNOSTICS | Age: 84
End: 2019-03-25

## 2019-03-25 ENCOUNTER — NURSE ONLY (OUTPATIENT)
Dept: NON INVASIVE DIAGNOSTICS | Age: 84
End: 2019-03-25
Payer: MEDICARE

## 2019-03-25 DIAGNOSIS — Z95.0 STATUS POST BIVENTRICULAR PACEMAKER: Primary | ICD-10-CM

## 2019-03-25 DIAGNOSIS — I48.19 PERSISTENT ATRIAL FIBRILLATION (HCC): Chronic | ICD-10-CM

## 2019-03-25 DIAGNOSIS — I50.33 ACUTE ON CHRONIC DIASTOLIC HEART FAILURE (HCC): ICD-10-CM

## 2019-03-25 LAB
ANION GAP SERPL CALCULATED.3IONS-SCNC: 15 MMOL/L (ref 7–16)
BUN BLDV-MCNC: 21 MG/DL (ref 8–23)
CALCIUM SERPL-MCNC: 9.7 MG/DL (ref 8.6–10.2)
CHLORIDE BLD-SCNC: 104 MMOL/L (ref 98–107)
CO2: 24 MMOL/L (ref 22–29)
CREAT SERPL-MCNC: 1.5 MG/DL (ref 0.5–1)
GFR AFRICAN AMERICAN: 40
GFR NON-AFRICAN AMERICAN: 33 ML/MIN/1.73
GLUCOSE BLD-MCNC: 96 MG/DL (ref 74–99)
POTASSIUM SERPL-SCNC: 5.2 MMOL/L (ref 3.5–5)
SODIUM BLD-SCNC: 143 MMOL/L (ref 132–146)

## 2019-03-25 PROCEDURE — 80048 BASIC METABOLIC PNL TOTAL CA: CPT

## 2019-03-25 PROCEDURE — 93281 PM DEVICE PROGR EVAL MULTI: CPT | Performed by: INTERNAL MEDICINE

## 2019-03-26 ENCOUNTER — TELEPHONE (OUTPATIENT)
Dept: NON INVASIVE DIAGNOSTICS | Age: 84
End: 2019-03-26

## 2019-03-26 DIAGNOSIS — I50.33 ACUTE ON CHRONIC DIASTOLIC HEART FAILURE (HCC): Primary | ICD-10-CM

## 2019-04-03 ENCOUNTER — TELEPHONE (OUTPATIENT)
Dept: NON INVASIVE DIAGNOSTICS | Age: 84
End: 2019-04-03

## 2019-04-03 DIAGNOSIS — I50.33 ACUTE ON CHRONIC DIASTOLIC HEART FAILURE (HCC): ICD-10-CM

## 2019-04-03 NOTE — TELEPHONE ENCOUNTER
I spoke to Juliana Mendoza and informed her of Nancy's BMP results. She will call and speak to the PCP about further treatment.

## 2019-04-03 NOTE — TELEPHONE ENCOUNTER
----- Message from THEO Mcintyre CNP sent at 4/3/2019  3:38 PM EDT -----  Mrs. Huntley was seen in hospital for device upgrade during her admission it was noted that her Creat. Increased from 1.2 to 1.6 a follow up BMP in 1 weeks showed her creat remained at 1.5, this needs to be followed up by her PCP, Jerald Arizmendi. Can we let him know.  Thank   ----- Message -----  From: Daniel Ramirez  Sent: 4/3/2019   2:53 PM  To: THEO Mcintyre CNP

## 2019-04-15 ENCOUNTER — TELEPHONE (OUTPATIENT)
Dept: NON INVASIVE DIAGNOSTICS | Age: 84
End: 2019-04-15

## 2019-04-15 NOTE — TELEPHONE ENCOUNTER
Call from patient stating that she hooked up her merlin monitor and she wanted to make sure she did it right. Instructed that her monitor is hooked up correctly and explained to keep her monitor plugged in. Patient voiced understanding. Patient states that she does have increase SOB. She denies any HF symptoms. Reviewed Merlin. Patient currently is in AF and has an appointment with Dr. Mita Thompson on 4.25.2019 to discuss antiarrhythmics. Offered to see if we can move her appointment up. Patient states that she is not able to move her appointment up due to her children being out of town. She will keep the 4 25.2019 appointment with Dr. Mita Thompson. . Instructed patient to call her cardiologist if she develops HF symptoms. Patient voiced understanding.      Aron Hearn

## 2019-04-23 NOTE — PROGRESS NOTES
[facility] Electrophysiology  Progress Report  PATIENT: Val Earl  MEDICAL RECORD NUMBER: [id number]  DATE OF SERVICE:  4/25/2019  ATTENDING ELECTROPHYSIOLOGIST: Gloria Blank MD  REFERRING PHYSICIAN: No ref. provider found and Chely Medina MD  CHIEF COMPLAINT: Persistent atrial fibrillation    HPI: This is a 81 y.o. female with a history of   Patient Active Problem List   Diagnosis    Chest pain    Persistent atrial fibrillation ([facility]    Essential hypertension    HLD (hyperlipidemia)    Hypothyroidism    Acute on chronic diastolic heart failure ([facility]    Pure hypercholesterolemia    Hyperthyroidism    Paced rhythm on electrocardiogram (ECG)    Pacemaker    Status post biventricular pacemaker    CKD (chronic kidney disease)   who presents to cardiac electrophysiology clinic for follow up visit for PPM management and persistent atrial fibrillation. Since her last office visit Ms. Huntley is s/p CRT-P upgrade on 3/11/19. She presents today to discussed rhythm control options. She states she feels not much better after CRT-P upgrade and does report SOB at rest and GARG. She was on Amiodarone in the past which was stopped due to marked elevation of her liver enzymes. Discussed with Ms. Huntley about AAD therapy including Tikosyn vs Sotalol but due to her last CrCl of 27.22 (based off Cr of 1.55 from April 5, 2019) she is limited in AAD therapy choices and only Sotalol could be considered. The patient's daughter does not wishes to pursue with restarting on Amiodarone. Also discussed with her about AV junctional ablation. She had not been seen by a pulmonologist and has not had any pulmonary workup as of yet. Her device site looks well healed and free from infection or erosion and offers no complaints from a device POV. Currently denies any chest pain, syncope, palpitations, paroxysmal nocturnal dyspnea and orthopnea.  The patient continues to be followed remotely. Patient Active Problem List    Diagnosis Date Noted    CKD (chronic kidney disease) 03/12/2019    Status post biventricular pacemaker     Pacemaker 03/11/2019    Paced rhythm on electrocardiogram (ECG)     Pure hypercholesterolemia     Hyperthyroidism     Chest pain 06/03/2017    Persistent atrial fibrillation ([address] 06/03/2017    Essential hypertension 06/03/2017    HLD (hyperlipidemia) 06/03/2017    Hypothyroidism 06/03/2017    Acute on chronic diastolic heart failure (HCC)        Past Medical History:   Diagnosis Date    Atrial fibrillation (HCC)     Heart palpitations     History of kidney problems     Hyperlipidemia     Hypertension     Hypothyroidism     Vertigo        History reviewed. No pertinent family history.     Social History     Tobacco Use    Smoking status: Never Smoker    Smokeless tobacco: Never Used   Substance Use Topics    Alcohol use: No       Current Outpatient Medications   Medication Sig Dispense Refill    apixaban (ELIQUIS) 2.5 MG TABS tablet Take 1 tablet by mouth 2 times daily Changed to 90 day script 180 tablet 1    LORazepam (ATIVAN) 0.5 MG tablet TAKE ONE TABLET BY MOUTH DAILY AS NEEDED  3    metoprolol succinate (TOPROL XL) 100 MG extended release tablet Take 1 tablet by mouth 2 times daily 180 tablet 3    furosemide (LASIX) 20 MG tablet Take 2 tablets by mouth daily 180 tablet 3    diltiazem (CARDIZEM CD) 360 MG extended release capsule Take 1 capsule by mouth daily 90 capsule 3    pravastatin (PRAVACHOL) 40 MG tablet Take 1 tablet by mouth daily 90 tablet 3    spironolactone (ALDACTONE) 25 MG tablet Take 1 tablet by mouth daily 90 tablet 3    levothyroxine (SYNTHROID) 125 MCG tablet Take 125 mcg by mouth Daily      vitamin D (CHOLECALCIFEROL) 400 UNITS TABS tablet Take 4,000 Units by mouth daily       apixaban (ELIQUIS) 2.5 MG TABS tablet Take 1 tablet by mouth 2 times daily Lot# [id number] Exp- 7/21 56 tablet 0     No current facility-administered medications for this visit. Allergies   Allergen Reactions    Amiodarone Other (See Comments)     Causes issues with liver enzymes. ROS:   Constitutional: Negative for fever. Positive for activity change and negative for appetite change. HENT: Negative for epistaxis. Eyes: Negative for diploplia, blurred vision. Respiratory: Negative for cough, chest tightness. Positive for shortness of breath and negative for wheezing. Cardiovascular: pertinent positives in HPI  Gastrointestinal: Negative for abdominal pain and blood in stool. All other review of systems are negative     PHYSICAL EXAM:   Vitals:    04/25/19 0856   BP: 118/72   Pulse: 88   Resp: 18   Weight: 170 lb (77.1 kg)   Height: 5' 7\" (1.702 m)      Constitutional: Well-developed, no acute distress  Eyes: conjunctivae normal, no xanthelasma   Ears, Nose, Throat: oral mucosa moist, no cyanosis   CV: no JVD. Irregularly irregular. No murmurs, rubs, or gallops. PMI is nondisplaced  Lungs: Basilar crackles bilaterally, normal respiratory effort without used of accessory muscles  Abdomen: soft, non-tender, bowel sounds present, no masses or hepatomegaly   Musculoskeletal: no digital clubbing, no edema   Skin: warm, no rashes   Pacemaker site: well healed. No erosion or infection or migration. I have personally reviewed the laboratory, cardiac diagnostic and radiographic testing as outlined below:    Data:    No results for input(s): WBC, HGB, HCT, PLT in the last 72 hours. No results for input(s): NA, K, CL, CO2, BUN, CREATININE, CALCIUM in the last 72 hours. Invalid input(s): GLU, MAGNESIUM   Lab Results   Component Value Date    MG 2.2 03/12/2019     No results for input(s): TSH in the last 72 hours. No results for input(s): INR in the last 72 hours.       Device Interrogation: 4/25/19   Underlying rhythm: AF  Mode: DDDR   Battery Voltage/Longevity:  4.6-5.3    Pacing: A: 0% RV: 89%  LV: 89%  P wave:                                        Unit No: [id number]                    EXAM#     TYPE/EXAM                       RESULT                        [id number] ECHO/ECHOCARDIOGRAM COMPLETE    SEE REPORT                                    --------------- APPROVED REPORT --------------                       EXAM: Comprehensive 2D, Doppler, and color-flow        Echocardiogram               Patient Location: Inpatient               Blood Pressure: 104/64 mmHg       HR: 70 bpm       Rhythm: AF,Pacemaker               Other Information        Study Quality: Fair       Technically limited study due to  inability to position patient has        vertigo laying down. [de-identified]           Indications       DX:  ACUTE ON CHRONIC DIASTOLIC HEART FAILURE               2D Dimensions       RVDd:  3.2 cm   LVEF(%):  40.7 (>50%)       IVSd:  1.1 (0.7-1.1cm)  LVOT Diam:  2.3 (1.8-2.4cm)        LVDd:  4.6 cm          PWd:  1.0 (0.7-1.1cm)  Ascending Aorta:  2.9 cm       LVDs:  3.9 (2.5-4.0cm)         Left Atrium:  4.4 (2.7-4.0cm) TAPSE:  1.3 (<1.7)           Stanford's LVEF:  40.7 %       LV Single Plane 4CH:  44.0 %                M-Mode Dimensions       RVDd:  1.3 (2.1-3.2cm) Left Atrium:  4.5 (2.5-4.0cm)       IVSd:  1.3 (0.7-1.1cm) Aortic Root:  3.2 (2.2-3.7cm)       LVDd:  4.9 (4.0-5.6cm) Aortic Cusp Exc.:  1.4 (1.5-2.0cm)       PWd:  1.2 (0.7-1.1cm) MV EPSS:  1.6 (<0.5cm)          FS(%):  28.3 %       LVDs:  3.5 (2.0-3.8cm) ESV (Teich):  50.9 ml       LVEF(%):  44.4 (>50%)           PAGE 1               Signed Report                     (CONTINUED)                                            Name: GONZALO GRUBER                                            Phys: JAYANTALFA JOHNSTONELIS                                            : 1935 Age: 81      Sex:  Jeral Mediate                                        Acct: [de-identified] Loc: CARD                                                Exam Date: 02/15/2019 Status: REG CLI                                    Exam Date: 02/15/2019 Status: REG CLI                                            Radiology No: [id number]                                            Unit No: [id number]                    EXAM#     TYPE/EXAM                       RESULT                        [id number] ECHO/ECHOCARDIOGRAM COMPLETE    SEE REPORT                           <Continued>          is present in the right ventricle.                Atria       Left atrium is mildly dilated. Interatrial septum not well        visualized. Right atrium size is normal.               Aortic Valve       The Aortic valve is sclerotic.               Mitral Valve       There is mitral annular calcification. Mild to moderate mitral        regurgitation.               Tricuspid Valve       The tricuspid valve is normal in structure. Moderate tricuspid        regurgitation, RVSP 34mmHg               Pulmonic Valve       Pulmonic valve is not well visualized. Mild pulmonic        regurgitation.               Great Vessels       Aortic root is normal in size. IVC is not well        visualized.               Pericardium       Mild anterior pericardial effusion.               Critical Notification       Critical Value: No               <Conclusion>       The left ventricle is normal size.       There is global hypokinesis of the left ventricle, LVEF is 45 +/-        3%       Mild concentric left ventricular hypertrophy.       Grade II - pseudonormal filling dynamics.      Left atrium is mildly dilated.          PAGE 3               Signed Report                     (CONTINUED)                                            Name: GONZALO GRUBER                                            Phys: JAN HERNANDEZ                                            : 1935 Age: 81      Sex:  Isabela Bruno                                        Acct: [de-identified] Loc: CARD                                                Exam Date: 02/15/2019 Status: REG CLI                                   angina  2. Catheterization: AUC score 8 . Indication 4.  3. PCI: AUC score 9 Indication 11.      Procedure: Left Heart Catheterization, coronary angiography, percutaneous coronary intervention to RCA       Anesthesia:  Fentanyl, benadryl  Time sedation was administered: 0841. I was present in the room when sedation was administered. Procedure end time: 8737  Time spent with face to face monitoring of moderate sedation: 45 min     Cardiac cath performed via right radial approach using Slender 6 sheath.     Findings:   Left main: 0% stenosis  LAD: mild, calcific disease  Circumflex: mild disease   RCA: Dominant.  80 % mid stenosis.         Hemodynamics: Ao: 115/63     Interventional procedure:   1. PCI vessel: RCA. Lesion type: B. JIM III flow pre PCI. Angioplasty performed with 2.5 balloon. Stenting performed with Alpine DANE 2.75 X 18 . Result: 0% residual stenosis and JIM III distal flow.      Drugs: . Anticoagulation was maintained with heparin .   600 mg Plavix load given  in cath lab.      Right radial sheath: pulled, TR band applied. There was good distal pulses in the RUE at the end of the procedure.     Complication: None   Blood loss: 5 cc  Contrast used: 91cc        Post op diagnosis:  Unstable Angina  PCI of RCA   Plan:  DAPT  Risk profile modification. See orders    Stress Test 6/3/17:   FINDINGS:   Minimal attenuation artifact is seen at the inferior left ventricular   myocardial wall due to underlying left hepatic lobe.       There is a normal distribution of left ventricular myocardial activity   on both the LexiScan stress and rest SPECT myocardial perfusion   images. Gated study shows normal left ventricular wall motion and   myocardial thickening during systole.  Resting left ventricular   ejection fraction is calculated at 77%.           Impression   No evidence of stress-induced left ventricular myocardial ischemia.                   I have independently reviewed all of the ECGs and rhythm strips per above     Assessment/Plan: This is a 81 y.o. female with a history of   Patient Active Problem List   Diagnosis    Chest pain    [facility]    Essential hypertension    HLD (hyperlipidemia)    Hypothyroidism    Acute on chronic diastolic heart failure ([facility]    Pure hypercholesterolemia    Hyperthyroidism    Paced rhythm on electrocardiogram (ECG)    Pacemaker    Status post biventricular pacemaker    CKD (chronic kidney disease)    who presents with persistent atrial fibrillation. 1. Persistent atrial fibrillation  - Questionable symptomatic.  - [id number] of 5.  - On rate control treatment with Metoprolol and Cardizem. - Developed side effect with Digoxin and Amiodarone in the past.  - On Eliquis for stroke risk reduction.  - Options of rate control versus rhythm control treatment were discussed. For rate control, consider AV junction ablation if failed medical therapy. For rhythm control treatment, we will try AAD therapy including Sotalol and Tikosyn and DC-cardioversion. The patient and family currently opts for rhythm control treatment option of Sotalol.  - CrCl of 27.22 mL/min (based off Cr of 1.55mg/dL from April 5, 2019). - Re-education on importance of well controlled HTN (goal BP < 130/80), adequate weight control (goal BMI of < 27), physical activity consisting of moderate cardiopulmonary exercise up to a goal of 250 min/wk, daily compliance with CPAP in treating sleep apnea, smoking cessation and limited ETOH intake. 2. Dual chamber pacemaker in situ  - DOI: 5/4/12.  - [employer] dual chamber.  - S/p CRT-P upgrade 3/11/19.  - Proper device function.  - AF burden 100%    3. Coronary artery disease  - S/p PCI of RCA on 6/26/17. 4. Hypertension  - On Cardizem and Metoprolol.  - BP well controlled. 5. Hyperlipidemia  - On Pravachol. 6. Hypothyroidism  - On Synthroid.     7. CKD  Estimated Creatinine Clearance: 31 mL/min (A) (based on SCr of 1.5 mg/dL (H)). 8. Dyspnea on exertion  - Unclear etiology. - History of Amiodarone usage.  - Consider pulmonary work up and PFT with DLCO. Recommendations:  1. Will schedule the patient for Sotalol admission and possible DC-cardioversion. 2. Continue Toprol XL and Cardizem for rate control for now. 3. Continue Eliquis for stroke risk reduction. 4. Urged pulmonology workup and PFT with DLCO - daughter states she will speak to PCP for referral for pulmonologist in [place] as soon as possible. 5. Follow up after the procedure. I have spent a total of 45 minutes with the patient and the family reviewing the above stated recommendations. And a total of >50% of that time involved face-to-face time providing counseling and or coordination of care with the other providers. Thank you for allowing me to participate in your patient's care. Please call me if there are any questions or concerns.       Dinesh Godinez MD  Cardiac Electrophysiology  [address]  [facility] [place]  4/25/19    9:06 AM

## 2019-04-25 ENCOUNTER — HOSPITAL ENCOUNTER (OUTPATIENT)
Age: 84
Discharge: HOME | End: 2019-04-27
Payer: MEDICARE

## 2019-04-25 ENCOUNTER — OFFICE VISIT (OUTPATIENT)
Dept: NON INVASIVE DIAGNOSTICS | Age: 84
End: 2019-04-25
Payer: MEDICARE

## 2019-04-25 VITALS
RESPIRATION RATE: 18 BRPM | WEIGHT: 170 LBS | HEIGHT: 67 IN | SYSTOLIC BLOOD PRESSURE: 118 MMHG | DIASTOLIC BLOOD PRESSURE: 72 MMHG | HEART RATE: 88 BPM | BODY MASS INDEX: 26.68 KG/M2

## 2019-04-25 DIAGNOSIS — Z79.899 AT RISK FOR AMIODARONE TOXICITY WITH LONG TERM USE: ICD-10-CM

## 2019-04-25 DIAGNOSIS — Z91.89 AT RISK FOR AMIODARONE TOXICITY WITH LONG TERM USE: ICD-10-CM

## 2019-04-25 DIAGNOSIS — R06.02 SOB (SHORTNESS OF BREATH): ICD-10-CM

## 2019-04-25 DIAGNOSIS — Z95.0 STATUS POST BIVENTRICULAR PACEMAKER: Primary | ICD-10-CM

## 2019-04-25 DIAGNOSIS — I48.19 PERSISTENT ATRIAL FIBRILLATION (HCC): Chronic | ICD-10-CM

## 2019-04-25 LAB
ALBUMIN SERPL-MCNC: 4.3 G/DL (ref 3.5–5.2)
ALP BLD-CCNC: 109 U/L (ref 35–104)
ALT SERPL-CCNC: 15 U/L (ref 0–32)
ANION GAP SERPL CALCULATED.3IONS-SCNC: 14 MMOL/L (ref 7–16)
AST SERPL-CCNC: 21 U/L (ref 0–31)
BILIRUB SERPL-MCNC: 0.7 MG/DL (ref 0–1.2)
BUN BLDV-MCNC: 22 MG/DL (ref 8–23)
CALCIUM SERPL-MCNC: 9.7 MG/DL (ref 8.6–10.2)
CHLORIDE BLD-SCNC: 104 MMOL/L (ref 98–107)
CO2: 25 MMOL/L (ref 22–29)
CREAT SERPL-MCNC: 1.4 MG/DL (ref 0.5–1)
GFR AFRICAN AMERICAN: 44
GFR NON-AFRICAN AMERICAN: 36 ML/MIN/1.73
GLUCOSE BLD-MCNC: 92 MG/DL (ref 74–99)
MAGNESIUM: 2.2 MG/DL (ref 1.6–2.6)
POTASSIUM SERPL-SCNC: 4.2 MMOL/L (ref 3.5–5)
SODIUM BLD-SCNC: 143 MMOL/L (ref 132–146)
TOTAL PROTEIN: 7.6 G/DL (ref 6.4–8.3)

## 2019-04-25 PROCEDURE — G8400 PT W/DXA NO RESULTS DOC: HCPCS | Performed by: INTERNAL MEDICINE

## 2019-04-25 PROCEDURE — 4040F PNEUMOC VAC/ADMIN/RCVD: CPT | Performed by: INTERNAL MEDICINE

## 2019-04-25 PROCEDURE — G8417 CALC BMI ABV UP PARAM F/U: HCPCS | Performed by: INTERNAL MEDICINE

## 2019-04-25 PROCEDURE — 93281 PM DEVICE PROGR EVAL MULTI: CPT | Performed by: INTERNAL MEDICINE

## 2019-04-25 PROCEDURE — 1036F TOBACCO NON-USER: CPT | Performed by: INTERNAL MEDICINE

## 2019-04-25 PROCEDURE — 80053 COMPREHEN METABOLIC PANEL: CPT

## 2019-04-25 PROCEDURE — 36415 COLL VENOUS BLD VENIPUNCTURE: CPT | Performed by: INTERNAL MEDICINE

## 2019-04-25 PROCEDURE — G8427 DOCREV CUR MEDS BY ELIG CLIN: HCPCS | Performed by: INTERNAL MEDICINE

## 2019-04-25 PROCEDURE — 99024 POSTOP FOLLOW-UP VISIT: CPT | Performed by: INTERNAL MEDICINE

## 2019-04-25 PROCEDURE — 1123F ACP DISCUSS/DSCN MKR DOCD: CPT | Performed by: INTERNAL MEDICINE

## 2019-04-25 PROCEDURE — 1090F PRES/ABSN URINE INCON ASSESS: CPT | Performed by: INTERNAL MEDICINE

## 2019-04-25 PROCEDURE — 83735 ASSAY OF MAGNESIUM: CPT

## 2019-04-30 LAB
DLCO %PRED: 28 %
DLCO PRED: NORMAL ML/MIN/MMHG
DLCO/VA %PRED: NORMAL %
DLCO/VA PRED: NORMAL ML/MIN/MMHG
DLCO/VA: NORMAL ML/MIN/MMHG
DLCO: NORMAL ML/MIN/MMHG
EXPIRATORY TIME-POST: NORMAL SEC
EXPIRATORY TIME: NORMAL SEC
FEF 25-75% %CHNG: NORMAL
FEF 25-75% %PRED-POST: NORMAL %
FEF 25-75% %PRED-PRE: NORMAL L/SEC
FEF 25-75% PRED: NORMAL L/SEC
FEF 25-75%-POST: NORMAL L/SEC
FEF 25-75%-PRE: NORMAL L/SEC
FEV1 %PRED-POST: 76 %
FEV1 %PRED-PRE: 2.06 %
FEV1 PRED: NORMAL L
FEV1-POST: NORMAL L
FEV1-PRE: NORMAL L
FEV1/FVC %PRED-POST: NORMAL %
FEV1/FVC %PRED-PRE: NORMAL %
FEV1/FVC PRED: NORMAL %
FEV1/FVC-POST: 114 %
FEV1/FVC-PRE: 76 %
FVC %PRED-POST: NORMAL L
FVC %PRED-PRE: NORMAL %
FVC PRED: NORMAL L
FVC-POST: NORMAL L
FVC-PRE: NORMAL L
GAW %PRED: NORMAL %
GAW PRED: NORMAL L/S/CMH2O
GAW: NORMAL L/S/CMH2O
IC %PRED: NORMAL %
IC PRED: NORMAL L
IC: NORMAL L
MEP: NORMAL
MIP: NORMAL
MVV %PRED-PRE: NORMAL %
MVV PRED: NORMAL L/MIN
MVV-PRE: NORMAL L/MIN
PEF %PRED-POST: NORMAL %
PEF %PRED-PRE: NORMAL L/SEC
PEF PRED: NORMAL L/SEC
PEF%CHNG: NORMAL
PEF-POST: NORMAL L/SEC
PEF-PRE: NORMAL L/SEC
RAW %PRED: NORMAL %
RAW PRED: NORMAL CMH2O/L/S
RAW: NORMAL CMH2O/L/S
RV %PRED: NORMAL %
RV PRED: NORMAL L
RV: NORMAL L
SVC %PRED: NORMAL %
SVC PRED: NORMAL L
SVC: NORMAL L
TLC %PRED: 5.5 %
TLC PRED: NORMAL L
TLC: NORMAL L
VA %PRED: NORMAL %
VA PRED: NORMAL L
VA: NORMAL L
VTG %PRED: NORMAL %
VTG PRED: NORMAL L
VTG: NORMAL L

## 2019-04-30 ASSESSMENT — PULMONARY FUNCTION TESTS
FEV1/FVC_PRE: 76
FEV1/FVC_POST: 114
FEV1_PERCENT_PREDICTED_PRE: 2.06
FEV1_PERCENT_PREDICTED_POST: 76

## 2019-05-02 DIAGNOSIS — Z91.89 AT RISK FOR AMIODARONE TOXICITY WITH LONG TERM USE: ICD-10-CM

## 2019-05-02 DIAGNOSIS — Z79.899 AT RISK FOR AMIODARONE TOXICITY WITH LONG TERM USE: ICD-10-CM

## 2019-05-02 DIAGNOSIS — R06.02 SOB (SHORTNESS OF BREATH): ICD-10-CM

## 2019-05-02 PROCEDURE — 94060 EVALUATION OF WHEEZING: CPT | Performed by: INTERNAL MEDICINE

## 2019-05-22 ENCOUNTER — OFFICE VISIT (OUTPATIENT)
Dept: CARDIOLOGY CLINIC | Age: 84
End: 2019-05-22
Payer: MEDICARE

## 2019-05-22 VITALS
RESPIRATION RATE: 18 BRPM | HEIGHT: 67 IN | BODY MASS INDEX: 26.84 KG/M2 | WEIGHT: 171 LBS | DIASTOLIC BLOOD PRESSURE: 70 MMHG | HEART RATE: 82 BPM | SYSTOLIC BLOOD PRESSURE: 126 MMHG

## 2019-05-22 DIAGNOSIS — I10 ESSENTIAL HYPERTENSION: Primary | Chronic | ICD-10-CM

## 2019-05-22 PROCEDURE — 93000 ELECTROCARDIOGRAM COMPLETE: CPT | Performed by: INTERNAL MEDICINE

## 2019-05-22 PROCEDURE — 99214 OFFICE O/P EST MOD 30 MIN: CPT | Performed by: INTERNAL MEDICINE

## 2019-05-22 PROCEDURE — 4040F PNEUMOC VAC/ADMIN/RCVD: CPT | Performed by: INTERNAL MEDICINE

## 2019-05-22 PROCEDURE — G8427 DOCREV CUR MEDS BY ELIG CLIN: HCPCS | Performed by: INTERNAL MEDICINE

## 2019-05-22 PROCEDURE — G8417 CALC BMI ABV UP PARAM F/U: HCPCS | Performed by: INTERNAL MEDICINE

## 2019-05-22 PROCEDURE — 1090F PRES/ABSN URINE INCON ASSESS: CPT | Performed by: INTERNAL MEDICINE

## 2019-05-22 PROCEDURE — G8400 PT W/DXA NO RESULTS DOC: HCPCS | Performed by: INTERNAL MEDICINE

## 2019-05-22 PROCEDURE — 1123F ACP DISCUSS/DSCN MKR DOCD: CPT | Performed by: INTERNAL MEDICINE

## 2019-05-22 PROCEDURE — 1036F TOBACCO NON-USER: CPT | Performed by: INTERNAL MEDICINE

## 2019-05-22 NOTE — PROGRESS NOTES
CHIEF COMPLAINT: CAD/Afib    HISTORY OF PRESENT ILLNESS: Patient is a 81 y.o. female seen at the request of Kat Bautista MD.      PCI to RCA 6/26/17. Increased GARG. Seeing pulmonary. No CP. Nausea and poor appetite improved after stopping digoxin. Past Medical History:    1. HTN  2. HLD, on statin therapy  3. Hypothyroidism, on statin therapy. 4. Echo: 6/3/2017:  Left ventricular internal dimensions were normal in diastole and systole, borderline LVH, Normal left ventricular ejection fraction, The left atrium is severely dilated, Moderately enlarged right atrium size, Focal calcification mitral valve leaflets, Moderate mitral annular calcification, Moderate mitral regurgitation is present, Moderate tricuspid regurgitation, Pulmonary hypertension is mild . 5. Lexiscan Stress Test: 6/3/2017: EF 77%; No evidence of stress-induced left ventricular myocardial ischemia. 6. History of cholecystectomy, right knee replacement, s/p lumbar lameinectomy  7. Mild Bilateral Carotid Disease: VL Carotid Brent: 6/24/2014:                         A.  Right internal carotid artery: 0-40%. B. Left internal carotid artery 0-40%. 8. Persistent Afib: [id number] score 4 (HTN, Age, Female). On Eliquis 2.5 mg BID. A. Evaluated by Dr. Selena Ulrich: 6/7/2017 resolution of her hyperthyroidism. Once this is occurred, reassessment of a long-term strategy either that of rate control  which if unachievable with medical therapy may require an atrioventricular junctional ablation or that of a rhythm control strategy if persistent symptoms of exertional intolerance or dyspnea are noted which will likely require an alternative antiarrhythmic agent based on toxicity with previous utilized amiodarone. 9. History of Complete Heart Block s/p dual chamber pacemaker ([employer]). Last reported interrogation was on 6/3/2017 (reports requested).      Past Medical History:   Diagnosis Date    Atrial fibrillation (HCC)     Heart palpitations     History of kidney problems     Hyperlipidemia     Hypertension     Hypothyroidism     Vertigo        Patient Active Problem List   Diagnosis    Chest pain    Persistent atrial fibrillation ([facility]    Essential hypertension    HLD (hyperlipidemia)    Hypothyroidism    Acute on chronic diastolic heart failure (HCC)    Pure hypercholesterolemia    Hyperthyroidism    Paced rhythm on electrocardiogram (ECG)    Pacemaker    Status post biventricular pacemaker    CKD (chronic kidney disease)       Allergies   Allergen Reactions    Amiodarone Other (See Comments)     Causes issues with liver enzymes. Current Outpatient Medications   Medication Sig Dispense Refill    apixaban (ELIQUIS) 2.5 MG TABS tablet Take 1 tablet by mouth 2 times daily Changed to 90 day script 180 tablet 1    LORazepam (ATIVAN) 0.5 MG tablet TAKE ONE TABLET BY MOUTH DAILY AS NEEDED  3    metoprolol succinate (TOPROL XL) 100 MG extended release tablet Take 1 tablet by mouth 2 times daily 180 tablet 3    furosemide (LASIX) 20 MG tablet Take 2 tablets by mouth daily 180 tablet 3    diltiazem (CARDIZEM CD) 360 MG extended release capsule Take 1 capsule by mouth daily 90 capsule 3    pravastatin (PRAVACHOL) 40 MG tablet Take 1 tablet by mouth daily 90 tablet 3    spironolactone (ALDACTONE) 25 MG tablet Take 1 tablet by mouth daily 90 tablet 3    levothyroxine (SYNTHROID) 125 MCG tablet Take 125 mcg by mouth Daily      vitamin D (CHOLECALCIFEROL) 400 UNITS TABS tablet Take 4,000 Units by mouth daily        No current facility-administered medications for this visit. Social History     Socioeconomic History    Marital status:       Spouse name: Not on file    Number of children: Not on file    Years of education: Not on file    Highest education level: Not on file   Occupational History    Not on file   Social Needs    Financial resource strain: Not on file    Food insecurity:     Worry: Not on file     Inability: Not on file    Transportation needs:     Medical: Not on file     Non-medical: Not on file   Tobacco Use    Smoking status: Never Smoker    Smokeless tobacco: Never Used   Substance and Sexual Activity    Alcohol use: No    Drug use: No    Sexual activity: Not on file   Lifestyle    Physical activity:     Days per week: Not on file     Minutes per session: Not on file    Stress: Not on file   Relationships    Social connections:     Talks on phone: Not on file     Gets together: Not on file     Attends Je[employer] witness service: Not on file     Active member of club or organization: Not on file     Attends meetings of clubs or organizations: Not on file     Relationship status: Not on file    Intimate partner violence:     Fear of current or ex partner: Not on file     Emotionally abused: Not on file     Physically abused: Not on file     Forced sexual activity: Not on file   Other Topics Concern    Not on file   Social History Narrative    Not on file       History reviewed. No pertinent family history. Review of Systems:  Heart: as above   Lungs: as above   Eyes: denies changes in vision or discharge. Ears: denies changes in hearing or pain. Nose: denies epistaxis or masses   Throat: denies sore throat or trouble swallowing. Neuro: denies numbness, tingling, tremors. Skin: denies rashes or itching. : denies hematuria, dysuria   GI: denies vomiting, diarrhea   Psych: denies mood changed, anxiety, depression. All other systems negative. Physical Exam   /70   Pulse 82   Resp 18   Ht 5' 7\" (1.702 m)   Wt 171 lb (77.6 kg)   BMI 26.78 kg/m²   Constitutional: Oriented to person, place, and time. Well-developed and well-nourished. No distress. Head: Normocephalic and atraumatic. Eyes: EOM are normal. Pupils are equal, round, and reactive to light. Neck: Normal range of motion.  Neck supple. No hepatojugular reflux and no JVD present. Carotid bruit is not present. No tracheal deviation present. No thyromegaly present. Cardiovascular: Normal rate, irregular rhythm, normal heart sounds and intact distal pulses. Exam reveals no gallop and no friction rub. No murmur heard. Pulmonary/Chest: Effort normal and breath sounds normal. No respiratory distress. No wheezes. No rales. No tenderness. Abdominal: Soft. Bowel sounds are normal. No distension and no mass. No tenderness. No rebound and no guarding. Musculoskeletal: Normal range of motion. No edema and no tenderness. Lymphadenopathy:   No cervical adenopathy. No groin adenopathy. Neurological: Alert and oriented to person, place, and time. Skin: Skin is warm and dry. No rash noted. Not diaphoretic. No erythema. Psychiatric: Normal mood and affect. Behavior is normal.     EKG:  atrial fibrillation, V paced. Cath Findings 6/26/17:   Left main: 0% stenosis  LAD: mild, calcific disease  Circumflex: mild disease   RCA: Dominant. 80 % mid stenosis. Hemodynamics: Ao: 115/63  Interventional procedure:   1. PCI vessel: RCA. Lesion type: B. JIM III flow pre PCI. Angioplasty performed with 2.5 balloon. Stenting performed with Alpine DANE 2.75 X 18 . Result: 0% residual stenosis and JIM III distal flow. ASSESSMENT AND PLAN:  Patient Active Problem List   Diagnosis    Chest pain    Persistent atrial fibrillation ([address]    Essential hypertension    HLD (hyperlipidemia)    Hypothyroidism    Acute on chronic diastolic heart failure ([address]    Pure hypercholesterolemia    Hyperthyroidism    Paced rhythm on electrocardiogram (ECG)    Pacemaker    Status post biventricular pacemaker    CKD (chronic kidney disease)     1. CAD: Asymptomatic. Continue Plavix/BB/statin. 2. Afib: Rate controlled and on Eliquis. (avoid digoxin in future as she had anorexia with it in past)    3.  Pacer: Stable interrogation 3/25/19.     4. GARG: Seeing pulm. For cardioversion. Yumiko Stafford D.O.   Cardiologist  Cardiology, [facility]

## 2019-06-10 ENCOUNTER — HOSPITAL ENCOUNTER (INPATIENT)
Age: 84
LOS: 3 days | Discharge: HOME | DRG: 308 | End: 2019-06-13
Attending: INTERNAL MEDICINE | Admitting: INTERNAL MEDICINE
Payer: MEDICARE

## 2019-06-10 LAB
ANION GAP SERPL CALCULATED.3IONS-SCNC: 12 MMOL/L (ref 7–16)
BASOPHILS ABSOLUTE: 0.07 E9/L (ref 0–0.2)
BASOPHILS RELATIVE PERCENT: 0.7 % (ref 0–2)
BUN BLDV-MCNC: 24 MG/DL (ref 8–23)
CALCIUM SERPL-MCNC: 9.6 MG/DL (ref 8.6–10.2)
CHLORIDE BLD-SCNC: 102 MMOL/L (ref 98–107)
CO2: 26 MMOL/L (ref 22–29)
CREAT SERPL-MCNC: 1.5 MG/DL (ref 0.5–1)
EOSINOPHILS ABSOLUTE: 0.65 E9/L (ref 0.05–0.5)
EOSINOPHILS RELATIVE PERCENT: 6.6 % (ref 0–6)
GFR AFRICAN AMERICAN: 40
GFR NON-AFRICAN AMERICAN: 33 ML/MIN/1.73
GLUCOSE BLD-MCNC: 113 MG/DL (ref 74–99)
HCT VFR BLD CALC: 39.8 % (ref 34–48)
HEMOGLOBIN: 13.4 G/DL (ref 11.5–15.5)
IMMATURE GRANULOCYTES #: 0.11 E9/L
IMMATURE GRANULOCYTES %: 1.1 % (ref 0–5)
LYMPHOCYTES ABSOLUTE: 1.91 E9/L (ref 1.5–4)
LYMPHOCYTES RELATIVE PERCENT: 19.5 % (ref 20–42)
MAGNESIUM: 2.1 MG/DL (ref 1.6–2.6)
MCH RBC QN AUTO: 34.4 PG (ref 26–35)
MCHC RBC AUTO-ENTMCNC: 33.7 % (ref 32–34.5)
MCV RBC AUTO: 102.3 FL (ref 80–99.9)
MONOCYTES ABSOLUTE: 1.18 E9/L (ref 0.1–0.95)
MONOCYTES RELATIVE PERCENT: 12 % (ref 2–12)
NEUTROPHILS ABSOLUTE: 5.9 E9/L (ref 1.8–7.3)
NEUTROPHILS RELATIVE PERCENT: 60.1 % (ref 43–80)
PDW BLD-RTO: 12.5 FL (ref 11.5–15)
PLATELET # BLD: 228 E9/L (ref 130–450)
PMV BLD AUTO: 10 FL (ref 7–12)
POTASSIUM SERPL-SCNC: 4.1 MMOL/L (ref 3.5–5)
RBC # BLD: 3.89 E12/L (ref 3.5–5.5)
SODIUM BLD-SCNC: 140 MMOL/L (ref 132–146)
TSH SERPL DL<=0.05 MIU/L-ACNC: 0.63 UIU/ML (ref 0.27–4.2)
WBC # BLD: 9.8 E9/L (ref 4.5–11.5)

## 2019-06-10 PROCEDURE — 80048 BASIC METABOLIC PNL TOTAL CA: CPT

## 2019-06-10 PROCEDURE — 36415 COLL VENOUS BLD VENIPUNCTURE: CPT

## 2019-06-10 PROCEDURE — 84443 ASSAY THYROID STIM HORMONE: CPT

## 2019-06-10 PROCEDURE — 85025 COMPLETE CBC W/AUTO DIFF WBC: CPT

## 2019-06-10 PROCEDURE — 83735 ASSAY OF MAGNESIUM: CPT

## 2019-06-10 PROCEDURE — 2140000000 HC CCU INTERMEDIATE R&B

## 2019-06-10 PROCEDURE — 93005 ELECTROCARDIOGRAM TRACING: CPT | Performed by: INTERNAL MEDICINE

## 2019-06-10 PROCEDURE — 6370000000 HC RX 637 (ALT 250 FOR IP): Performed by: INTERNAL MEDICINE

## 2019-06-10 RX ORDER — METOPROLOL SUCCINATE 100 MG/1
100 TABLET, EXTENDED RELEASE ORAL 2 TIMES DAILY
Status: DISCONTINUED | OUTPATIENT
Start: 2019-06-10 | End: 2019-06-13 | Stop reason: HOSPADM

## 2019-06-10 RX ORDER — LEVOTHYROXINE SODIUM 0.12 MG/1
125 TABLET ORAL DAILY
Status: DISCONTINUED | OUTPATIENT
Start: 2019-06-11 | End: 2019-06-13 | Stop reason: HOSPADM

## 2019-06-10 RX ORDER — SOTALOL HYDROCHLORIDE 120 MG/1
120 TABLET ORAL EVERY OTHER DAY
Status: DISCONTINUED | OUTPATIENT
Start: 2019-06-11 | End: 2019-06-10

## 2019-06-10 RX ORDER — PRAVASTATIN SODIUM 20 MG
40 TABLET ORAL DAILY
Status: DISCONTINUED | OUTPATIENT
Start: 2019-06-11 | End: 2019-06-13 | Stop reason: HOSPADM

## 2019-06-10 RX ORDER — DILTIAZEM HYDROCHLORIDE 180 MG/1
360 CAPSULE, COATED, EXTENDED RELEASE ORAL DAILY
Status: DISCONTINUED | OUTPATIENT
Start: 2019-06-11 | End: 2019-06-13 | Stop reason: HOSPADM

## 2019-06-10 RX ORDER — LORAZEPAM 0.5 MG/1
0.5 TABLET ORAL DAILY PRN
Status: DISCONTINUED | OUTPATIENT
Start: 2019-06-10 | End: 2019-06-13 | Stop reason: HOSPADM

## 2019-06-10 RX ORDER — CHOLECALCIFEROL (VITAMIN D3) 50 MCG
4000 TABLET ORAL DAILY
Status: DISCONTINUED | OUTPATIENT
Start: 2019-06-11 | End: 2019-06-13 | Stop reason: HOSPADM

## 2019-06-10 RX ORDER — SOTALOL HYDROCHLORIDE 120 MG/1
120 TABLET ORAL EVERY OTHER DAY
Status: DISCONTINUED | OUTPATIENT
Start: 2019-06-10 | End: 2019-06-11

## 2019-06-10 RX ORDER — SODIUM CHLORIDE 0.9 % (FLUSH) 0.9 %
10 SYRINGE (ML) INJECTION PRN
Status: DISCONTINUED | OUTPATIENT
Start: 2019-06-10 | End: 2019-06-13 | Stop reason: HOSPADM

## 2019-06-10 RX ORDER — FUROSEMIDE 40 MG/1
40 TABLET ORAL DAILY
Status: DISCONTINUED | OUTPATIENT
Start: 2019-06-11 | End: 2019-06-13 | Stop reason: HOSPADM

## 2019-06-10 RX ORDER — SOTALOL HYDROCHLORIDE 120 MG/1
120 TABLET ORAL EVERY OTHER DAY
Status: DISCONTINUED | OUTPATIENT
Start: 2019-06-11 | End: 2019-06-10 | Stop reason: SDUPTHER

## 2019-06-10 RX ORDER — SPIRONOLACTONE 25 MG/1
25 TABLET ORAL DAILY
Status: DISCONTINUED | OUTPATIENT
Start: 2019-06-11 | End: 2019-06-13 | Stop reason: HOSPADM

## 2019-06-10 RX ORDER — ACETAMINOPHEN 325 MG/1
650 TABLET ORAL EVERY 4 HOURS PRN
Status: DISCONTINUED | OUTPATIENT
Start: 2019-06-10 | End: 2019-06-13 | Stop reason: HOSPADM

## 2019-06-10 RX ORDER — SODIUM CHLORIDE 0.9 % (FLUSH) 0.9 %
10 SYRINGE (ML) INJECTION EVERY 12 HOURS SCHEDULED
Status: DISCONTINUED | OUTPATIENT
Start: 2019-06-10 | End: 2019-06-13 | Stop reason: HOSPADM

## 2019-06-10 RX ADMIN — LORAZEPAM 0.5 MG: 0.5 TABLET ORAL at 22:18

## 2019-06-10 RX ADMIN — SOTALOL HYDROCHLORIDE 120 MG: 120 TABLET ORAL at 22:18

## 2019-06-10 ASSESSMENT — PAIN SCALES - GENERAL: PAINLEVEL_OUTOF10: 0

## 2019-06-10 NOTE — PROGRESS NOTES
Message sent to Dr. Jerzy Greene (Dr. Harlan Inman on call) for admission orders through [facility].

## 2019-06-10 NOTE — LETTER
Beneficiary Notification Letter     This Jean Claude Zhang Provider is Participating in an Innovative Payment and [address] [place] [address] Strong from Medicare     Greetings:   [address] [place] [address] is participating in a Medicare initiative called the [facility] for [address] [facility]. You are receiving this letter because your health care provider has identified you as a patient who is receiving care through this initiative. Health care providers participating in the [facility] [address] [facility], including [address] [place] [address], will work with Medicare to improve care for patients. Your Medicare rights have not been changed. You still have all the same Medicare rights and protections, including the right to choose which hospital, doctor, or other health care provider you see. However, because [address] [place] [address] chose to participate in the [address] [place] [address], all Medicare beneficiaries who meet the eligibility criteria of this initiative will receive care under the initiative. If you do not wish to receive care under the Bundled Payments for [address] [facility], you must choose a health care provider that does not participate in this initiative for your care. Regardless of which health care provider you see, Medicare will continue to cover all of your medically necessary services. Bundled Payments for Care Improvement Advanced aims to help improve your care     The Bundled Payments for [address] [facility] is an innovative Medicare initiative that encourages your doctors, hospitals, and other health care providers to work more closely together so you get better care during and following certain hospital stays.  In this initiative, doctors and hospitals may work closely with certain health care providers and suppliers that help patients recover after discharge from the hospital, including skilled nursing facilities, home health agencies, inpatient rehabilitation facilities, and long term care hospitals. [address] [place] [address] is working closely with the doctors and other health care providers that care for you during and following your hospital stay and for a period of time after you leave the hospital. By working together, the health care providers are trying to more efficiently provide well-managed, high quality, patient-centered care as you undergo treatment. Hospitals, doctors, and other health care providers that care for you following a hospital stay may receive an additional payment for providing better, more coordinated health care. Medicare will monitor your care to make sure you and others get high quality care. Your feedback is important     Medicare may also ask you to answer a survey about the services and care you received from [facility] [address] will be mailed to you. Your feedback will improve care for all people with Medicare who receive care from [address] [place] [address]. Completion of this survey is optional.     Get more information     For more information about the Bundled Payments for [address] [place] [address], you can:    · Visit the CMS BPCI Advanced Website at http://sinha-pulliam.net/ initiatives/bpci-advanced   · Call the [facility] BPCI-A team at (924) 900-1619. .   · Call 1-800-MEDICARE (4-298.240.9331). TTY users can call 3-490.305.8055     If you have concerns or complaints about your care, talk to your health care provider, or contact your Beneficiary and Family Centered Quality Improvement Organization [place] [facility]). To get your CC-QIO's phone number, visit Medicare.gov/contacts or call 1-800-MEDICARE. · To find a different hospital, visit www. hospitalcompare.[facility].gov or call 1-800- MEDICARE (9-229.174.3953). TTY users should call 6-623.582.9339. · To find a different doctor, visit Medicare's Physician Compare website, [employer] in MotionTaStatusPage.co.nz, or call 1-800-MEDICARE (118 6171). TTY users should call 5-390.775.6307. · To find a different skilled nursing facility, visit [employer] website, https://www.CloudSync/, or call 1-800-MEDICARE (1- 901.664.3247). TTY users should call 4-795.576.7812. · To find a different long term care hospital, visit [facility] Compare website, MightyText.[employer] Info, or call 1-800- MEDICARE (472 0568). TTY users should call 8-928.773.6655. · To find a different inpatient rehabilitation facility, visit [facility] [address] Compare website, www.medicare.gov/ inpatientrehabilitation facilitycompare, or call 1-800-MEDICARE (0-194.432.2221). TTY users should call 0- 899.938.9138. · To find a different home health agency, visit [address] website, www.medicare.gov/homehealthcompare, or call 1-800-MEDICARE (1-493- 650-9760). TTY users should call 8-862.794.1281.

## 2019-06-11 LAB
ANION GAP SERPL CALCULATED.3IONS-SCNC: 12 MMOL/L (ref 7–16)
BUN BLDV-MCNC: 24 MG/DL (ref 8–23)
CALCIUM SERPL-MCNC: 9 MG/DL (ref 8.6–10.2)
CHLORIDE BLD-SCNC: 104 MMOL/L (ref 98–107)
CO2: 24 MMOL/L (ref 22–29)
CREAT SERPL-MCNC: 1.4 MG/DL (ref 0.5–1)
EKG ATRIAL RATE: 63 BPM
EKG ATRIAL RATE: 78 BPM
EKG Q-T INTERVAL: 450 MS
EKG Q-T INTERVAL: 476 MS
EKG QRS DURATION: 130 MS
EKG QRS DURATION: 132 MS
EKG QTC CALCULATION (BAZETT): 506 MS
EKG QTC CALCULATION (BAZETT): 535 MS
EKG R AXIS: -168 DEGREES
EKG R AXIS: -169 DEGREES
EKG T AXIS: 3 DEGREES
EKG T AXIS: 4 DEGREES
EKG VENTRICULAR RATE: 76 BPM
EKG VENTRICULAR RATE: 76 BPM
GFR AFRICAN AMERICAN: 43
GFR NON-AFRICAN AMERICAN: 36 ML/MIN/1.73
GLUCOSE BLD-MCNC: 96 MG/DL (ref 74–99)
MAGNESIUM: 2 MG/DL (ref 1.6–2.6)
POTASSIUM SERPL-SCNC: 3.9 MMOL/L (ref 3.5–5)
SODIUM BLD-SCNC: 140 MMOL/L (ref 132–146)

## 2019-06-11 PROCEDURE — 2580000003 HC RX 258: Performed by: INTERNAL MEDICINE

## 2019-06-11 PROCEDURE — 80048 BASIC METABOLIC PNL TOTAL CA: CPT

## 2019-06-11 PROCEDURE — 2140000000 HC CCU INTERMEDIATE R&B

## 2019-06-11 PROCEDURE — 6370000000 HC RX 637 (ALT 250 FOR IP): Performed by: NURSE PRACTITIONER

## 2019-06-11 PROCEDURE — 93281 PM DEVICE PROGR EVAL MULTI: CPT | Performed by: INTERNAL MEDICINE

## 2019-06-11 PROCEDURE — 99223 1ST HOSP IP/OBS HIGH 75: CPT | Performed by: INTERNAL MEDICINE

## 2019-06-11 PROCEDURE — 36415 COLL VENOUS BLD VENIPUNCTURE: CPT

## 2019-06-11 PROCEDURE — 83735 ASSAY OF MAGNESIUM: CPT

## 2019-06-11 PROCEDURE — 93010 ELECTROCARDIOGRAM REPORT: CPT | Performed by: INTERNAL MEDICINE

## 2019-06-11 PROCEDURE — 93005 ELECTROCARDIOGRAM TRACING: CPT | Performed by: INTERNAL MEDICINE

## 2019-06-11 PROCEDURE — 6370000000 HC RX 637 (ALT 250 FOR IP): Performed by: INTERNAL MEDICINE

## 2019-06-11 RX ORDER — POTASSIUM CHLORIDE 20 MEQ/1
20 TABLET, EXTENDED RELEASE ORAL ONCE
Status: COMPLETED | OUTPATIENT
Start: 2019-06-11 | End: 2019-06-11

## 2019-06-11 RX ORDER — SOTALOL HYDROCHLORIDE 120 MG/1
120 TABLET ORAL EVERY OTHER DAY
Status: DISCONTINUED | OUTPATIENT
Start: 2019-06-12 | End: 2019-06-13 | Stop reason: HOSPADM

## 2019-06-11 RX ADMIN — DILTIAZEM HYDROCHLORIDE 360 MG: 180 CAPSULE, EXTENDED RELEASE ORAL at 15:07

## 2019-06-11 RX ADMIN — LEVOTHYROXINE SODIUM 125 MCG: 125 TABLET ORAL at 06:55

## 2019-06-11 RX ADMIN — METOPROLOL SUCCINATE 100 MG: 100 TABLET, EXTENDED RELEASE ORAL at 21:52

## 2019-06-11 RX ADMIN — LORAZEPAM 0.5 MG: 0.5 TABLET ORAL at 21:55

## 2019-06-11 RX ADMIN — Medication 10 ML: at 21:53

## 2019-06-11 RX ADMIN — PRAVASTATIN SODIUM 40 MG: 20 TABLET ORAL at 08:17

## 2019-06-11 RX ADMIN — APIXABAN 2.5 MG: 2.5 TABLET, FILM COATED ORAL at 08:19

## 2019-06-11 RX ADMIN — METOPROLOL SUCCINATE 100 MG: 100 TABLET, EXTENDED RELEASE ORAL at 08:19

## 2019-06-11 RX ADMIN — Medication 4000 UNITS: at 08:18

## 2019-06-11 RX ADMIN — FUROSEMIDE 40 MG: 40 TABLET ORAL at 08:17

## 2019-06-11 RX ADMIN — APIXABAN 2.5 MG: 2.5 TABLET, FILM COATED ORAL at 21:53

## 2019-06-11 RX ADMIN — POTASSIUM CHLORIDE 20 MEQ: 20 TABLET, EXTENDED RELEASE ORAL at 10:49

## 2019-06-11 RX ADMIN — SPIRONOLACTONE 25 MG: 25 TABLET ORAL at 08:18

## 2019-06-11 RX ADMIN — Medication 10 ML: at 08:19

## 2019-06-11 ASSESSMENT — PAIN SCALES - GENERAL
PAINLEVEL_OUTOF10: 0

## 2019-06-11 NOTE — PLAN OF CARE
Problem: Cardiac:  Goal: Cardiovascular alteration will improve  Description  Cardiovascular alteration will improve  Outcome: Met This Shift  Goal: Ability to maintain an adequate cardiac output will improve  Description  Ability to maintain an adequate cardiac output will improve  Outcome: Met This Shift  Goal: Hemodynamic stability will improve  Description  Hemodynamic stability will improve  Outcome: Met This Shift

## 2019-06-11 NOTE — PROGRESS NOTES
[facility] Cardiac Electrophysiology    ECG reviewed: AF with BiV pacing. QTc: 446 ms. Reviewed Labs: Cr stable at 1.5 with a CrCl of 28 ml/min. Dr. Dejon Martinez aware of her prior CrCl and Cr of 1.5. Based on her CrCl, sotalol dosing will be QOD. Therefore will start Sotalol 120 mg QOD.     Sean Ortiz DO  [facility] Cardiac Electrophysiology  [address] [facility]

## 2019-06-11 NOTE — PLAN OF CARE
Problem: Cardiac:  Goal: Cardiovascular alteration will improve  Description  Cardiovascular alteration will improve  Outcome: Met This Shift

## 2019-06-11 NOTE — PLAN OF CARE
Problem: Cardiac:  Goal: Cardiovascular alteration will improve  Description  Cardiovascular alteration will improve  6/11/2019 1826 by Shalonda Mullen RN  Outcome: Met This Shift     Problem: Cardiac:  Goal: Ability to maintain an adequate cardiac output will improve  Description  Ability to maintain an adequate cardiac output will improve  6/11/2019 1826 by Shalonda Mullen RN  Outcome: Met This Shift     Problem: Cardiac:  Goal: Hemodynamic stability will improve  Description  Hemodynamic stability will improve  6/11/2019 1826 by Shalonda Mullen RN  Outcome: Met This Shift

## 2019-06-11 NOTE — H&P
[address] [facility] Electrophysiology  History and Physical Examination  PATIENT: Chaya Castillo  MEDICAL RECORD NUMBER: [id number]  DATE OF SERVICE:  6/11/2019  ATTENDING ELECTROPHYSIOLOGIST: Octavia Soares MD  REFERRING PHYSICIAN: No ref. provider found and Suman Dias MD  CHIEF COMPLAINT: Persistent atrial fibrillation    HPI: This is a 81 y.o. female with a history of   Patient Active Problem List   Diagnosis    Chest pain    Persistent atrial fibrillation ([address]    Essential hypertension    HLD (hyperlipidemia)    Hypothyroidism    Acute on chronic diastolic heart failure ([address]    Pure hypercholesterolemia    Hyperthyroidism    Paced rhythm on electrocardiogram (ECG)    Pacemaker    Status post biventricular pacemaker    CKD (chronic kidney disease)   who presents to the hospital for Sotalol initiation for treatment of her persistent atrial fibrillation. The patient has had history of complete AV block status post dual chamber permanent pacemaker implantation, persistent atrial fibrillation and CAD s/p PCI. The patient was on Digoxin, Diltiazem and Metoprolol for rate control but developed side effect with Digoxin and it was stopped. She was also strated on Amiodarone in the past but it was discontinued due to marked elevation of liver enzymes. She reports that she has been feeling poorly. She states she feels increase GARG and dizziness. She denies any chest pain or palpitations, syncope, orthopnea or paroxysmal nocturnal dyspnea. She was found to have SARITHA of the pacemaker pulse generator. Echocardiogram showed LV EF of 45% on 2/15/19. Due to frequent RV pacing > 40%, she subsequently underwent CRT-P upgrade on 3/11/19. Since the upgrade, her GARG remained unchanged. She underwent PFT and CT of lung which showed mild restrictive lung disease with decrease lung diffusion and mild pulmonary fibrotic change, respectively.  She is scheduled for Sotalol admission yesterday. Due to her CrCl of 28 mL/min, she was started on Sotalol 120 mg every other day yesterday. Her baseline QTc was 506 msec and was 535 msec, 2 hours after Sotalol dose. Patient Active Problem List    Diagnosis Date Noted    CKD (chronic kidney disease) 03/12/2019    Status post biventricular pacemaker     Pacemaker 03/11/2019    Paced rhythm on electrocardiogram (ECG)     Pure hypercholesterolemia     Hyperthyroidism     Chest pain 06/03/2017    Persistent atrial fibrillation ([address] 06/03/2017    Essential hypertension 06/03/2017    HLD (hyperlipidemia) 06/03/2017    Hypothyroidism 06/03/2017    Acute on chronic diastolic heart failure (HCC)        Past Medical History:   Diagnosis Date    Atrial fibrillation (HCC)     Heart palpitations     History of kidney problems     Hyperlipidemia     Hypertension     Hypothyroidism     Vertigo        History reviewed. No pertinent family history.     Social History     Tobacco Use    Smoking status: Never Smoker    Smokeless tobacco: Never Used   Substance Use Topics    Alcohol use: No       Current Facility-Administered Medications   Medication Dose Route Frequency Provider Last Rate Last Dose    potassium chloride (KLOR-CON M) extended release tablet 20 mEq  20 mEq Oral Once Demetris Sports, APRN - CNP        apixaban Terrence Moritz) tablet 2.5 mg  2.5 mg Oral BID Philemon Lowell, DO   2.5 mg at 06/11/19 4155    diltiazem (CARDIZEM CD) extended release capsule 360 mg  360 mg Oral Daily Philemon Gala, DO        furosemide (LASIX) tablet 40 mg  40 mg Oral Daily Philemon Gala, DO   40 mg at 06/11/19 5934    levothyroxine (SYNTHROID) tablet 125 mcg  125 mcg Oral Daily Philemon Lowell, DO   125 mcg at 06/11/19 3018    LORazepam (ATIVAN) tablet 0.5 mg  0.5 mg Oral Daily PRN Philemon Gala, DO   0.5 mg at 06/10/19 9937    metoprolol succinate (TOPROL XL) extended release tablet 100 mg  100 mg Oral BID Philemon Lowell, DO   100 mg at 06/11/19 5095  pravastatin (PRAVACHOL) tablet 40 mg  40 mg Oral Daily Velora Jerson, DO   40 mg at 06/11/19 2191    spironolactone (ALDACTONE) tablet 25 mg  25 mg Oral Daily Velora Jerson, DO   25 mg at 06/11/19 0818    vitamin D tablet 4,000 Units  4,000 Units Oral Daily Velora Jerson, DO   4,000 Units at 06/11/19 0818    sodium chloride flush 0.9 % injection 10 mL  10 mL Intravenous 2 times per day Velora Jerson, DO   10 mL at 06/11/19 0549    sodium chloride flush 0.9 % injection 10 mL  10 mL Intravenous PRN Velora Jerson, DO        magnesium hydroxide (MILK OF MAGNESIA) 400 MG/5ML suspension 30 mL  30 mL Oral Daily PRN Velora Jerson, DO        acetaminophen (TYLENOL) tablet 650 mg  650 mg Oral Q4H PRN Velora Jerson, DO        sotalol (BETAPACE) tablet 120 mg  120 mg Oral Every Other Day Velora Jerson, DO   120 mg at 06/10/19 2218        Allergies   Allergen Reactions    Amiodarone Other (See Comments)     Causes issues with liver enzymes. ROS:   Constitutional: Negative for fever, activity change and appetite change. HENT: Negative for epistaxis. Eyes: Negative for diploplia, blurred vision. Respiratory: Negative for cough, chest tightness. Positive for shortness of breath and negative for wheezing. Cardiovascular: pertinent positives in HPI  Gastrointestinal: Negative for abdominal pain and blood in stool. All other review of systems are negative     PHYSICAL EXAM:   Vitals:    06/10/19 1848 06/11/19 0015 06/11/19 0815   BP: 117/71 (!) 102/59 (!) 109/58   Pulse: 80 75 75   Resp: 16 16 18   Temp: 97.8 °F (36.6 °C) 97.8 °F (36.6 °C) 98.9 °F (37.2 °C)   TempSrc: Temporal Temporal    SpO2: 97% 95% 97%   Weight: 169 lb (76.7 kg)     Height: 5' 7\" (1.702 m)        Constitutional: Well-developed, no acute distress  Eyes: conjunctivae normal, no xanthelasma   Ears, Nose, Throat: oral mucosa moist, no cyanosis   CV: no JVD. Irregularly irregular. No murmurs, rubs, or gallops.  PMI is nondisplaced  Lungs: Basilar crackles bilaterally, normal respiratory effort without used of accessory muscles  Abdomen: soft, non-tender, bowel sounds present, no masses or hepatomegaly   Musculoskeletal: no digital clubbing, no edema   Skin: warm, no rashes   Pacemaker site: well healed. No erosion or infection or migration. I have personally reviewed the laboratory, cardiac diagnostic and radiographic testing as outlined below:    Data:    Recent Labs     06/10/19  1917   WBC 9.8   HGB 13.4   HCT 39.8        Recent Labs     06/10/19  1917 06/11/19  0540    140   K 4.1 3.9    104   CO2 26 24   BUN 24* 24*   CREATININE 1.5* 1.4*   CALCIUM 9.6 9.0      Lab Results   Component Value Date    MG 2.0 06/11/2019     Recent Labs     06/10/19  1917   TSH 0.629     No results for input(s): INR in the last 72 hours. Echocardiogram 2/15/19:  EXAM: Comprehensive 2D, Doppler, and color-flow        Echocardiogram               Patient Location: Inpatient               Blood Pressure: 104/64 mmHg       HR: 70 bpm       Rhythm: AF,Pacemaker               Other Information        Study Quality: Fair       Technically limited study due to  inability to position patient has        vertigo laying down. [de-identified]           Indications       DX:  ACUTE ON CHRONIC DIASTOLIC HEART FAILURE               2D Dimensions       RVDd:  3.2 cm   LVEF(%):  40.7 (>50%)       IVSd:  1.1 (0.7-1.1cm)  LVOT Diam:  2.3 (1.8-2.4cm)        LVDd:  4.6 cm          PWd:  1.0 (0.7-1.1cm)  Ascending Aorta:  2.9 cm       LVDs:  3.9 (2.5-4.0cm)         Left Atrium:  4.4 (2.7-4.0cm) TAPSE:  1.3 (<1.7)           Stanford's LVEF:  40.7 %       LV Single Plane 4CH:  44.0 %                M-Mode Dimensions       RVDd:  1.3 (2.1-3.2cm) Left Atrium:  4.5 (2.5-4.0cm)       IVSd:  1.3 (0.7-1.1cm) Aortic Root:  3.2 (2.2-3.7cm)       LVDd:  4.9 (4.0-5.6cm) Aortic Cusp Exc.:  1.4 (1.5-2.0cm)       PWd:  1.2 (0.7-1.1cm) MV EPSS:  1.6 (<0.5cm)          FS(%):  28.3 %       LVDs:  3.5 (2.0-3.8cm) ESV (Teich):  50.9 ml       LVEF(%):  44.4 (>50%)           PAGE 1               Signed Report                     (CONTINUED)                                            Name: GONZALO GRUBER                                            Phys: JAN HERNANDEZ                                            : 1935 Age: 81      Sex: F                                            Acct: [de-identified] Loc: CARD                                                Exam Date: 02/15/2019 Status: REG CLI                                            Radiology No: [id number]                                            Unit No: [id number]                    EXAM#     TYPE/EXAM                       RESULT                        [id number] ECHO/ECHOCARDIOGRAM COMPLETE    SEE REPORT                           <Continued>                  Volumes       Left Atrial Volume (Systole)        Single Plane 4CH:  69.0 mL Single Plane 2CH:  65.0 mL       Biplane LA Volume:  67.0 mL LA ESV Index:  36.0 mL/m2               Aortic Valve       AO Mean Gr.:  2.0 mmHg LV Mean P.0 mmHg       AO V2 Mean:  0.6 m/s        AO V2 VTI:  16.4 cm LV Mean:  0.4 m/s       BALJEET (VTI):  3.0 cm2 LV V1 VTI:  11.9 cm               Mitral Valve          MV PHT:  54.0 ms       MV Mean Gr.:  3.0 mmHg MVA (PHT):  4.1 cm2       MV E Max Albert.:  1.3 m/s           MV Decel. Time:  261.0 ms       MV VTI:  33.8 cm                TDI       E/Lateral E':  13.0 E/Medial E':  13.0       E':  59.5  Medial E' Albert.:  0.1 m/s          Lateral E' Albert.:  0.1 m/s               Pulmonary Valve       PV Peak Albert.:  0.6 m/s PV Peak Gr.:  2.0 mmHg               Tricuspid Valve       TR Peak Gr.:  29.0 mmHg                Left Ventricle       The left ventricle is normal size. There is global hypokinesis of the        left ventricle. Mild concentric left ventricular hypertrophy. LVEF is        45%. Grade II - pseudonormal filling dynamics.                Right Ventricle       Right pseudonormal filling dynamics.      Left atrium is mildly dilated.          PAGE 3               Signed Report                     (CONTINUED)                                            Name: GONZALO GRUBER                                            Phys: JAN HERNANDEZ                                            : 1935 Age: 81      Sex: Andrey Deal                                        Acct: [de-identified] Loc: CARD                                                Exam Date: 02/15/2019 Status: REG CLI                                            Radiology No: [id number]                                            Unit No: [id number]                    EXAM#     TYPE/EXAM                       RESULT                        [id number] ECHO/ECHOCARDIOGRAM COMPLETE    SEE REPORT                           <Continued>          There is mitral annular calcification.       Mild to moderate mitral regurgitation.       Moderate tricuspid regurgitation, RVSP 34mmHG. Sona Vinay   Mild pulmonic regurgitation.       Aortic root is normal in size.       Small pericardial effusion.       No intracardiac mass.       Prior echo in 2017.       TDS unable to tolerate laying flat due to vertigo. Echocardiogram 17:     Cardiac Catheterization 17:   PROCEDURE:  Left heart catheterization, coronary angiography, PCI of RCA.     TECHNIQUE:  Right radial access was obtained with the aid of ultrasound  guidance. A glide sheath slender 6 was placed with no difficulty. Coronary  angiography was performed with a TIG catheter for the right coronary artery  and a JL3.5 for the left coronary artery. The aortic valve was not crossed. Heparin was administered and guided by ACT determination. The patient had  received only aspirin yesterday and was given 600 mg oral Plavix in the lab. A 6-Chinese JR4 guiding catheter with side holes was used for the right  coronary artery which provided excellent backup.   Floppy J-wire was advanced  to the distal portion of the vessel, and the lesion was predilated with a 2.5  x 15 mm balloon and stented with an Alpine 2.75 x 18 mm stent. There were no  complications. Indication:    1. Unstable angina  2. Catheterization: AUC score 8 . Indication 4.  3. PCI: AUC score 9 Indication 11.      Procedure: Left Heart Catheterization, coronary angiography, percutaneous coronary intervention to RCA       Anesthesia:  Fentanyl, benadryl  Time sedation was administered: 0841. I was present in the room when sedation was administered. Procedure end time: 9133  Time spent with face to face monitoring of moderate sedation: 45 min     Cardiac cath performed via right radial approach using Slender 6 sheath.     Findings:   Left main: 0% stenosis  LAD: mild, calcific disease  Circumflex: mild disease   RCA: Dominant.  80 % mid stenosis.         Hemodynamics: Ao: 115/63     Interventional procedure:   1. PCI vessel: RCA. Lesion type: B. JIM III flow pre PCI. Angioplasty performed with 2.5 balloon. Stenting performed with Alpine DANE 2.75 X 18 . Result: 0% residual stenosis and JIM III distal flow.      Drugs: . Anticoagulation was maintained with heparin .   600 mg Plavix load given  in cath lab.      Right radial sheath: pulled, TR band applied. There was good distal pulses in the RUE at the end of the procedure.     Complication: None   Blood loss: 5 cc  Contrast used: 91cc        Post op diagnosis:  Unstable Angina  PCI of RCA   Plan:  DAPT  Risk profile modification. See orders    Stress Test 6/3/17:   FINDINGS:   Minimal attenuation artifact is seen at the inferior left ventricular   myocardial wall due to underlying left hepatic lobe.       There is a normal distribution of left ventricular myocardial activity   on both the LexiScan stress and rest SPECT myocardial perfusion   images. Gated study shows normal left ventricular wall motion and   myocardial thickening during systole.  Resting left ventricular ejection fraction is calculated at 77%.           Impression   No evidence of stress-induced left ventricular myocardial ischemia.                 Device Interrogation: 4/25/19   Underlying rhythm: AF  Mode: DDDR   Battery Voltage/Longevity:  4.6-5.3    Pacing: A: 0% RV: 89%  LV: 89%  P wave: 1.1 mV  Impedance: 460 ohms   Threshold: AF  RV R wave: 6.9 mV  Impedance: 430 ohms   Threshold: 1.0 V @ 1.0 ms  LV R wave: Impedance:810 ohms   Threshold:1.0 V @ 0.5 ms  Episodes: AF burden: 100% - Vrates controlled 70-90's  Reprogramming included: see below  Overall device function is normal     All device programmable settings were evaluated per above and in the scanned document, along with iterative adjustments (capture thresholds) to assess and select the most appropriate final programming to provide for consistent delivery of the appropriate therapy and to verify function of the device. I have independently reviewed all of the ECGs and rhythm strips per above     Assessment/Plan: This is a 81 y.o. female with a history of   Patient Active Problem List   Diagnosis    Chest pain    Persistent atrial fibrillation ([address]    Essential hypertension    HLD (hyperlipidemia)    Hypothyroidism    Acute on chronic diastolic heart failure ([address]    Pure hypercholesterolemia    Hyperthyroidism    Paced rhythm on electrocardiogram (ECG)    Pacemaker    Status post biventricular pacemaker    CKD (chronic kidney disease)    who presents with persistent atrial fibrillation. 1. Persistent atrial fibrillation  - Questionable symptomatic.  - [id number] of 5.  - On rate control treatment with Metoprolol and Cardizem. - Developed side effect with Digoxin and Amiodarone in the past.  - On Eliquis for stroke risk reduction.  - Options of rate control versus rhythm control treatment were discussed. The patient opts for rhythm control treatment.  For rhythm control treatment, we will try AAD therapy with Sotalol and DC-cardioversion.   - CrCl of 28 mL/min by IBW (based off Cr of 1.5 mg/dL from Janet 10, 2019), Sotalol 120 mg every 48 hours was started on 6/10/19.  - Re-education on importance of well controlled HTN (goal BP < 130/80), adequate weight control (goal BMI of < 27), physical activity consisting of moderate cardiopulmonary exercise up to a goal of 250 min/wk, daily compliance with CPAP in treating sleep apnea, smoking cessation and limited ETOH intake.      2. CRT-P in situ  - DOI: 5/4/12.  - [place] dual chamber.  - S/p CRT-P upgrade 3/11/19.  - Proper device function.  - AF burden 100%.     3. Coronary artery disease  - S/p PCI of RCA on 6/26/17.     4. Hypertension  - On Cardizem and Metoprolol.  - BP well controlled.     5. Hyperlipidemia  - On Pravachol.     6. Hypothyroidism  - On Synthroid.     7. CKD  Estimated Creatinine Clearance: 33 mL/min (A) (based on SCr of 1.4 mg/dL (H)).     8. Dyspnea on exertion  - Unclear etiology. - History of Amiodarone usage.  - Thus far, could not explain from pulmonary work up results. - Will schedule nuclear stress test while hospitalizes. - Will observe symptoms after she is converted to NSR. Recommendations:    1. Continue Sotalol 120 mg every other day. 2. ECG 2 hrs after each dose of Sotalol. 3. Daily Mg and BMP. Keep K > 4 and Mg > 2.  4. Telemetry. 5. Continue other medications as is. 6. If the patient remains in AF after his 2th dose of Sotalol, will plan for electrical CV. 7. Will schedule nuclear stress test to exclude cardiac ischemia. I have spent a total of 90 minutes with the patient and the family reviewing the above stated recommendations. And a total of >50% of that time involved face-to-face time providing counseling and or coordination of care with the other providers. Thank you for allowing me to participate in your patient's care. Please call me if there are any questions or concerns.       Dinesh Godinez MD  Cardiac

## 2019-06-12 ENCOUNTER — APPOINTMENT (OUTPATIENT)
Dept: NUCLEAR MEDICINE | Age: 84
DRG: 308 | End: 2019-06-12
Attending: INTERNAL MEDICINE
Payer: MEDICARE

## 2019-06-12 ENCOUNTER — APPOINTMENT (OUTPATIENT)
Dept: NON INVASIVE DIAGNOSTICS | Age: 84
DRG: 308 | End: 2019-06-12
Attending: INTERNAL MEDICINE
Payer: MEDICARE

## 2019-06-12 LAB
ANION GAP SERPL CALCULATED.3IONS-SCNC: 16 MMOL/L (ref 7–16)
BUN BLDV-MCNC: 26 MG/DL (ref 8–23)
CALCIUM SERPL-MCNC: 9.3 MG/DL (ref 8.6–10.2)
CHLORIDE BLD-SCNC: 99 MMOL/L (ref 98–107)
CO2: 24 MMOL/L (ref 22–29)
CREAT SERPL-MCNC: 1.4 MG/DL (ref 0.5–1)
GFR AFRICAN AMERICAN: 43
GFR NON-AFRICAN AMERICAN: 36 ML/MIN/1.73
GLUCOSE BLD-MCNC: 94 MG/DL (ref 74–99)
LV EF: 70 %
LVEF MODALITY: NORMAL
MAGNESIUM: 1.9 MG/DL (ref 1.6–2.6)
POTASSIUM SERPL-SCNC: 3.8 MMOL/L (ref 3.5–5)
SODIUM BLD-SCNC: 139 MMOL/L (ref 132–146)

## 2019-06-12 PROCEDURE — 6370000000 HC RX 637 (ALT 250 FOR IP): Performed by: INTERNAL MEDICINE

## 2019-06-12 PROCEDURE — 99233 SBSQ HOSP IP/OBS HIGH 50: CPT | Performed by: INTERNAL MEDICINE

## 2019-06-12 PROCEDURE — 3430000000 HC RX DIAGNOSTIC RADIOPHARMACEUTICAL: Performed by: RADIOLOGY

## 2019-06-12 PROCEDURE — 99233 SBSQ HOSP IP/OBS HIGH 50: CPT | Performed by: NURSE PRACTITIONER

## 2019-06-12 PROCEDURE — A9500 TC99M SESTAMIBI: HCPCS | Performed by: RADIOLOGY

## 2019-06-12 PROCEDURE — 93018 CV STRESS TEST I&R ONLY: CPT | Performed by: INTERNAL MEDICINE

## 2019-06-12 PROCEDURE — 6370000000 HC RX 637 (ALT 250 FOR IP): Performed by: NURSE PRACTITIONER

## 2019-06-12 PROCEDURE — 36415 COLL VENOUS BLD VENIPUNCTURE: CPT

## 2019-06-12 PROCEDURE — 78452 HT MUSCLE IMAGE SPECT MULT: CPT

## 2019-06-12 PROCEDURE — 6360000002 HC RX W HCPCS: Performed by: INTERNAL MEDICINE

## 2019-06-12 PROCEDURE — 93017 CV STRESS TEST TRACING ONLY: CPT

## 2019-06-12 PROCEDURE — 83735 ASSAY OF MAGNESIUM: CPT

## 2019-06-12 PROCEDURE — 2140000000 HC CCU INTERMEDIATE R&B

## 2019-06-12 PROCEDURE — 93016 CV STRESS TEST SUPVJ ONLY: CPT | Performed by: INTERNAL MEDICINE

## 2019-06-12 PROCEDURE — 80048 BASIC METABOLIC PNL TOTAL CA: CPT

## 2019-06-12 PROCEDURE — 2580000003 HC RX 258: Performed by: INTERNAL MEDICINE

## 2019-06-12 RX ORDER — POTASSIUM CHLORIDE 20 MEQ/1
40 TABLET, EXTENDED RELEASE ORAL ONCE
Status: COMPLETED | OUTPATIENT
Start: 2019-06-12 | End: 2019-06-12

## 2019-06-12 RX ORDER — LANOLIN ALCOHOL/MO/W.PET/CERES
400 CREAM (GRAM) TOPICAL ONCE
Status: COMPLETED | OUTPATIENT
Start: 2019-06-12 | End: 2019-06-12

## 2019-06-12 RX ADMIN — Medication 10 ML: at 20:41

## 2019-06-12 RX ADMIN — Medication 35 MILLICURIE: at 09:25

## 2019-06-12 RX ADMIN — PRAVASTATIN SODIUM 40 MG: 20 TABLET ORAL at 12:32

## 2019-06-12 RX ADMIN — REGADENOSON 0.4 MG: 0.08 INJECTION, SOLUTION INTRAVENOUS at 09:22

## 2019-06-12 RX ADMIN — Medication 12 MILLICURIE: at 07:54

## 2019-06-12 RX ADMIN — SPIRONOLACTONE 25 MG: 25 TABLET ORAL at 12:33

## 2019-06-12 RX ADMIN — Medication 4000 UNITS: at 12:32

## 2019-06-12 RX ADMIN — FUROSEMIDE 40 MG: 40 TABLET ORAL at 12:32

## 2019-06-12 RX ADMIN — LORAZEPAM 0.5 MG: 0.5 TABLET ORAL at 20:41

## 2019-06-12 RX ADMIN — METOPROLOL SUCCINATE 100 MG: 100 TABLET, EXTENDED RELEASE ORAL at 12:33

## 2019-06-12 RX ADMIN — APIXABAN 2.5 MG: 2.5 TABLET, FILM COATED ORAL at 12:33

## 2019-06-12 RX ADMIN — METOPROLOL SUCCINATE 100 MG: 100 TABLET, EXTENDED RELEASE ORAL at 20:41

## 2019-06-12 RX ADMIN — MAGNESIUM GLUCONATE 500 MG ORAL TABLET 400 MG: 500 TABLET ORAL at 12:32

## 2019-06-12 RX ADMIN — Medication 10 ML: at 12:34

## 2019-06-12 RX ADMIN — DILTIAZEM HYDROCHLORIDE 360 MG: 180 CAPSULE, EXTENDED RELEASE ORAL at 15:21

## 2019-06-12 RX ADMIN — LEVOTHYROXINE SODIUM 125 MCG: 125 TABLET ORAL at 06:22

## 2019-06-12 RX ADMIN — APIXABAN 2.5 MG: 2.5 TABLET, FILM COATED ORAL at 20:41

## 2019-06-12 RX ADMIN — POTASSIUM CHLORIDE 40 MEQ: 20 TABLET, EXTENDED RELEASE ORAL at 12:33

## 2019-06-12 RX ADMIN — SOTALOL HYDROCHLORIDE 120 MG: 120 TABLET ORAL at 20:40

## 2019-06-12 ASSESSMENT — PAIN SCALES - GENERAL
PAINLEVEL_OUTOF10: 0

## 2019-06-12 NOTE — PROCEDURES
[facility] Nuclear Stress Test:    Cardiologist: Dr. Rodger Zuñiga [facility]    Date: 6/12/2019    Indications for study: Chest pain    1. No chest pain  2. Paced rhythm  3. Non-diagnostic stress EKG  4.  Nuclear images pending    Jeevan Rojo MD  [facility]) Cardiology

## 2019-06-12 NOTE — PROGRESS NOTES
Cardiac Electrophysiology Inpatient Progress Note    Rogelio Jamil  1935  Date of Service: 6/12/2019  PCP: Eli Dial MD  Primary Electrophysiologist: Kena Graham MD  Covering Electrophysiologist: Rock Anson DO        Subjective: Sabrina Huntley is seen in hospital follow-up and management of: persistent AF, Sotalol AAD therapy initiation. Sotalol 120 mg QOD dosing initiated 6/10/19 based on baseline CrCl 28 mL/min. Her QTc is stable. K+ 3.8, Mg++ 1.9, BUN 26, ser cr 1.4. She returned from stress test. No complaints. Telemetry  AF with . Patient Active Problem List   Diagnosis    Chest pain    Persistent atrial fibrillation (HCC)    Essential hypertension    HLD (hyperlipidemia)    Hypothyroidism    Acute on chronic diastolic heart failure (HCC)    Pure hypercholesterolemia    Hyperthyroidism    Paced rhythm on electrocardiogram (ECG)    Pacemaker    Biventricular cardiac pacemaker in situ    CKD (chronic kidney disease)         Scheduled Medications:   potassium chloride  40 mEq Oral Once    magnesium oxide  400 mg Oral Once    sotalol  120 mg Oral Every Other Day    apixaban  2.5 mg Oral BID    diltiazem  360 mg Oral Daily    furosemide  40 mg Oral Daily    levothyroxine  125 mcg Oral Daily    metoprolol succinate  100 mg Oral BID    pravastatin  40 mg Oral Daily    spironolactone  25 mg Oral Daily    vitamin D  4,000 Units Oral Daily    sodium chloride flush  10 mL Intravenous 2 times per day         PRN Medications:  technetium sestamibi, regadenoson, LORazepam, sodium chloride flush, magnesium hydroxide, acetaminophen    Allergies   Allergen Reactions    Amiodarone Other (See Comments)     Causes issues with liver enzymes.         Wt Readings from Last 3 Encounters:   06/12/19 164 lb 12.8 oz (74.8 kg)   05/22/19 171 lb (77.6 kg)   04/25/19 170 lb (77.1 kg)     Temp Readings from Last 3 Encounters:   06/12/19 97.6 °F (36.4 °C) (Temporal)   03/12/19 98.6 °F (37 °C) (Temporal)   06/27/17 97.7 °F (36.5 °C) (Oral)     BP Readings from Last 3 Encounters:   06/12/19 107/64   05/22/19 126/70   04/25/19 118/72     Pulse Readings from Last 3 Encounters:   06/12/19 75   05/22/19 82   04/25/19 88         Intake/Output Summary (Last 24 hours) at 6/12/2019 0801  Last data filed at 6/12/2019 0657  Gross per 24 hour   Intake 240 ml   Output 1300 ml   Net -1060 ml       ROS:   Constitutional: Negative for fever, activity change and appetite change. HENT: Negative for epistaxis, difficulty swallowing. Eyes: Negative for blurred vision or double vision. Respiratory: Negative for cough, chest tightness, shortness of breath and wheezing. Cardiovascular: Negative for chest pain, palpitations and leg swelling. Gastrointestinal: Negative for abdominal pain, heartburn, or blood in stool. Genitourinary: Negative for hematuria, burning or frequency. Musculoskeletal: Negative for myalgias, stiffness, or swelling. Skin: Negative for rash, pain, or lumps. Neurological: Negative for syncope, seizures, or headaches. Psychiatric/Behavioral: Negative for confusion and agitation. The patient is not nervous/anxious. PHYSICAL EXAM:  Vitals:    06/11/19 2300 06/12/19 0400 06/12/19 0657 06/12/19 0735   BP: 128/66 107/64     Pulse: 74 75  75   Resp: 18 18  18   Temp: 97.5 °F (36.4 °C) 97.6 °F (36.4 °C)     TempSrc: Temporal Temporal     SpO2: 97% 95%     Weight:   164 lb 12.8 oz (74.8 kg)    Height:         Constitutional: Oriented to person, place, and time. Well-developed and cooperative. Head: Normocephalic and atraumatic. Eyes: Conjunctivae are normal.    Neck: No hepatojugular reflux and no JVD present. Carotid bruit is not present. Cardiovascular: S1 normal, S2 normal and intact distal pulses. A regular rhythm present. PMI is not displaced. Pulmonary/Chest: Effort normal and breath sounds normal. No respiratory distress. Abdominal: Soft. No tenderness. (2.7-4.0cm) TAPSE: 1.3 (<1.7)  Stanford's LVEF: 40.7 %  LV Single Plane 4CH: 44.0 %     M-Mode Dimensions  RVDd: 1.3 (2.1-3.2cm) Left Atrium: 4.5 (2.5-4.0cm)  IVSd: 1.3 (0.7-1.1cm) Aortic Root: 3.2 (2.2-3.7cm)  LVDd: 4.9 (4.0-5.6cm) Aortic Cusp Exc.: 1.4 (1.5-2.0cm)  PWd: 1.2 (0.7-1.1cm) MV EPSS: 1.6 (<0.5cm)  FS(%): 28.3 %  LVDs: 3.5 (2.0-3.8cm) ESV (Teich): 50.9 ml  LVEF(%): 44.4 (>50%)     PAGE 1 Signed Report (CONTINUED)  Name: Francis Atwood  Phys: Tiffanienain Alexis  : 1935 Age: 81 Sex: F  Acct: [de-identified] Loc: CARD   Exam Date: 02/15/2019 Status: REG CLI  Radiology No: [id number]  Unit No: [id number]             EXAM# TYPE/EXAM RESULT   [id number] ECHO/ECHOCARDIOGRAM COMPLETE SEE REPORT   <Continued>      Volumes  Left Atrial Volume (Systole)   Single Plane 4CH: 69.0 mL Single Plane 2CH: 65.0 mL  Biplane LA Volume: 67.0 mL LA ESV Index: 36.0 mL/m2    Aortic Valve  AO Mean Gr.: 2.0 mmHg LV Mean P.0 mmHg  AO V2 Mean: 0.6 m/s   AO V2 VTI: 16.4 cm LV Mean: 0.4 m/s  BALJEET (VTI): 3.0 cm2 LV V1 VTI: 11.9 cm    Mitral Valve  MV PHT: 54.0 ms  MV Mean Gr.: 3.0 mmHg MVA (PHT): 4.1 cm2  MV E Max Albert.: 1.3 m/s   MV Decel. Time: 261.0 ms  MV VTI: 33.8 cm     TDI  E/Lateral E': 13.0 E/Medial E': 13.0  E': 59.5 Medial E' Albert.: 0.1 m/s  Lateral E' Albert.: 0.1 m/s    Pulmonary Valve  PV Peak Albert.: 0.6 m/s PV Peak Gr.: 2.0 mmHg    Tricuspid Valve  TR Peak Gr.: 29.0 mmHg     Left Ventricle  The left ventricle is normal size. There is global hypokinesis of the   left ventricle. Mild concentric left ventricular hypertrophy. LVEF is   45%. Grade II - pseudonormal filling dynamics. Right Ventricle  Right ventricle is moderately dilated. The right ventricle is normal   size.  The right ventricular systolic function is normal. Device lead     PAGE 2 Signed Report (CONTINUED)  Name: Francis Atwood  Phys: Tiffanienain Vanesa  : 1935 Age: 81 Sex: F  Acct: [de-identified] Loc: CARD   Exam Date: 02/15/2019 Status: REG CLI  Radiology No: [id number]  Unit No: [id number]             EXAM# TYPE/EXAM RESULT   [id number] ECHO/ECHOCARDIOGRAM COMPLETE SEE REPORT   <Continued>    is present in the right ventricle. Atria  Left atrium is mildly dilated. Interatrial septum not well   visualized. Right atrium size is normal.    Aortic Valve  The Aortic valve is sclerotic. Mitral Valve  There is mitral annular calcification. Mild to moderate mitral   regurgitation. Tricuspid Valve  The tricuspid valve is normal in structure. Moderate tricuspid   regurgitation, RVSP 34mmHg    Pulmonic Valve  Pulmonic valve is not well visualized. Mild pulmonic   regurgitation. Great Vessels  Aortic root is normal in size. IVC is not well   visualized. Pericardium  Mild anterior pericardial effusion. Critical Notification  Critical Value: No    <Conclusion>  The left ventricle is normal size. There is global hypokinesis of the left ventricle, LVEF is 45 +/-   3%  Mild concentric left ventricular hypertrophy. Grade II - pseudonormal filling dynamics. Left atrium is mildly dilated. PAGE 3 Signed Report (CONTINUED)  Name: Karen Spine  Phys: Rajwinder Millard  : 1935 Age: 81 Sex: F  Acct: [de-identified] Loc: CARD   Exam Date: 02/15/2019 Status: REG [facility]  Radiology No: [id number]  Unit No: [id number]             EXAM# TYPE/EXAM RESULT   [id number] ECHO/ECHOCARDIOGRAM COMPLETE SEE REPORT   <Continued>    There is mitral annular calcification. Mild to moderate mitral regurgitation. Moderate tricuspid regurgitation, RVSP 34mmHG. .  Mild pulmonic regurgitation. Aortic root is normal in size. Small pericardial effusion. No intracardiac mass. Prior echo in 2017. TDS unable to tolerate laying flat due to vertigo. Echocardiogram 17:     Cardiac Catheterization 17:   PROCEDURE: Left heart catheterization, coronary angiography, PCI of RCA. TECHNIQUE: Right radial access was obtained with the aid of ultrasound   guidance.  A glide sheath slender 6 was placed with no difficulty. Coronary   angiography was performed with a TIG catheter for the right coronary artery   and a JL3.5 for the left coronary artery. The aortic valve was not crossed. Heparin was administered and guided by ACT determination. The patient had   received only aspirin yesterday and was given 600 mg oral Plavix in the lab. A 6-Honduran JR4 guiding catheter with side holes was used for the right   coronary artery which provided excellent backup. Floppy J-wire was advanced   to the distal portion of the vessel, and the lesion was predilated with a 2.5   x 15 mm balloon and stented with an Alpine 2.75 x 18 mm stent. There were no   complications. Indication:   1. Unstable angina   2. Catheterization: AUC score 8 . Indication 4.   3. PCI: AUC score 9 Indication 11. Procedure: Left Heart Catheterization, coronary angiography, percutaneous coronary intervention to RCA   Anesthesia: Fentanyl, benadryl   Time sedation was administered: 0841. I was present in the room when sedation was administered. Procedure end time: 1565   Time spent with face to face monitoring of moderate sedation: 45 min   Cardiac cath performed via right radial approach using Slender 6 sheath. Findings:   Left main: 0% stenosis   LAD: mild, calcific disease   Circumflex: mild disease   RCA: Dominant. 80 % mid stenosis. Hemodynamics: Ao: 115/63   Interventional procedure:   1. PCI vessel: RCA. Lesion type: B. JIM III flow pre PCI. Angioplasty performed with 2.5 balloon. Stenting performed with Alpine DANE 2.75 X 18 . Result: 0% residual stenosis and JIM III distal flow. Drugs: . Anticoagulation was maintained with heparin . 600 mg Plavix load given in cath lab. Right radial sheath: pulled, TR band applied. There was good distal pulses in the RUE at the end of the procedure.    Complication: None   Blood loss: 5 cc   Contrast used: 91cc   Post op diagnosis:   Unstable Angina   PCI of RCA Plan: DAPT   Risk profile modification. See orders   Stress Test 6/3/17:   FINDINGS:   Minimal attenuation artifact is seen at the inferior left ventricular   myocardial wall due to underlying left hepatic lobe. There is a normal distribution of left ventricular myocardial activity   on both the LexiScan stress and rest SPECT myocardial perfusion   images. Gated study shows normal left ventricular wall motion and   myocardial thickening during systole. Resting left ventricular   ejection fraction is calculated at 77%. Impression   No evidence of stress-induced left ventricular myocardial ischemia. Device Interrogation: 4/25/19   Underlying rhythm: AF   Mode: DDDR    Battery Voltage/Longevity: 4.6-5.3   Pacing: A: 0% RV: 89% LV: 89%   P wave: 1.1 mV Impedance: 460 ohms Threshold: AF   RV R wave: 6.9 mV Impedance: 430 ohms Threshold: 1.0 V @ 1.0 ms   LV R wave: Impedance:810 ohms Threshold:1.0 V @ 0.5 ms   Episodes: AF burden: 100% - Vrates controlled 70-90's   Reprogramming included: see below   Overall device function is normal   All device programmable settings were evaluated per above and in the scanned document, along with iterative adjustments (capture thresholds) to assess and select the most appropriate final programming to provide for consistent delivery of the appropriate therapy and to verify function of the device. Telemetry6/12/2019:  AF,     ECG 6/12/2019:  Pending    I have independently reviewed all of the ECGs and rhythm strips per above. I have personally reviewed the laboratory, cardiac diagnostic and radiographic testing as outlined above. I have reviewed previous records noted in [address]. Impression:    1. Persistent atrial fibrillation  - Questionable symptomatic.  - [id number] of 5.  - Rate control: Metoprolol and Cardizem.   - Developed side effect with Digoxin and Amiodarone in the past.  - Eliquis for stroke risk

## 2019-06-13 ENCOUNTER — ANESTHESIA (OUTPATIENT)
Dept: CARDIAC CATH/INVASIVE PROCEDURES | Age: 84
DRG: 308 | End: 2019-06-13
Payer: MEDICARE

## 2019-06-13 ENCOUNTER — ANESTHESIA EVENT (OUTPATIENT)
Dept: CARDIAC CATH/INVASIVE PROCEDURES | Age: 84
DRG: 308 | End: 2019-06-13
Payer: MEDICARE

## 2019-06-13 VITALS
DIASTOLIC BLOOD PRESSURE: 61 MMHG | TEMPERATURE: 98.2 F | SYSTOLIC BLOOD PRESSURE: 104 MMHG | WEIGHT: 163.4 LBS | HEART RATE: 75 BPM | RESPIRATION RATE: 16 BRPM | HEIGHT: 67 IN | OXYGEN SATURATION: 94 % | BODY MASS INDEX: 25.65 KG/M2

## 2019-06-13 VITALS
DIASTOLIC BLOOD PRESSURE: 57 MMHG | SYSTOLIC BLOOD PRESSURE: 89 MMHG | RESPIRATION RATE: 17 BRPM | OXYGEN SATURATION: 97 %

## 2019-06-13 LAB
ANION GAP SERPL CALCULATED.3IONS-SCNC: 15 MMOL/L (ref 7–16)
BUN BLDV-MCNC: 30 MG/DL (ref 8–23)
CALCIUM SERPL-MCNC: 9.5 MG/DL (ref 8.6–10.2)
CHLORIDE BLD-SCNC: 99 MMOL/L (ref 98–107)
CO2: 22 MMOL/L (ref 22–29)
CREAT SERPL-MCNC: 1.5 MG/DL (ref 0.5–1)
EKG ATRIAL RATE: 250 BPM
EKG ATRIAL RATE: 75 BPM
EKG P AXIS: -27 DEGREES
EKG P-R INTERVAL: 166 MS
EKG Q-T INTERVAL: 476 MS
EKG Q-T INTERVAL: 478 MS
EKG QRS DURATION: 124 MS
EKG QRS DURATION: 128 MS
EKG QTC CALCULATION (BAZETT): 531 MS
EKG QTC CALCULATION (BAZETT): 533 MS
EKG R AXIS: -161 DEGREES
EKG R AXIS: -170 DEGREES
EKG T AXIS: 6 DEGREES
EKG T AXIS: 8 DEGREES
EKG VENTRICULAR RATE: 75 BPM
EKG VENTRICULAR RATE: 75 BPM
GFR AFRICAN AMERICAN: 40
GFR NON-AFRICAN AMERICAN: 33 ML/MIN/1.73
GLUCOSE BLD-MCNC: 102 MG/DL (ref 74–99)
MAGNESIUM: 2.1 MG/DL (ref 1.6–2.6)
POTASSIUM SERPL-SCNC: 4.6 MMOL/L (ref 3.5–5)
SODIUM BLD-SCNC: 136 MMOL/L (ref 132–146)

## 2019-06-13 PROCEDURE — 83735 ASSAY OF MAGNESIUM: CPT

## 2019-06-13 PROCEDURE — 93010 ELECTROCARDIOGRAM REPORT: CPT | Performed by: INTERNAL MEDICINE

## 2019-06-13 PROCEDURE — 93005 ELECTROCARDIOGRAM TRACING: CPT | Performed by: INTERNAL MEDICINE

## 2019-06-13 PROCEDURE — 80048 BASIC METABOLIC PNL TOTAL CA: CPT

## 2019-06-13 PROCEDURE — 99239 HOSP IP/OBS DSCHRG MGMT >30: CPT | Performed by: INTERNAL MEDICINE

## 2019-06-13 PROCEDURE — 4B02XSZ MEASUREMENT OF CARDIAC PACEMAKER, EXTERNAL APPROACH: ICD-10-PCS | Performed by: INTERNAL MEDICINE

## 2019-06-13 PROCEDURE — 2709999900 HC NON-CHARGEABLE SUPPLY

## 2019-06-13 PROCEDURE — 92960 CARDIOVERSION ELECTRIC EXT: CPT | Performed by: INTERNAL MEDICINE

## 2019-06-13 PROCEDURE — 5A2204Z RESTORATION OF CARDIAC RHYTHM, SINGLE: ICD-10-PCS | Performed by: INTERNAL MEDICINE

## 2019-06-13 PROCEDURE — 2580000003 HC RX 258: Performed by: NURSE ANESTHETIST, CERTIFIED REGISTERED

## 2019-06-13 PROCEDURE — 2580000003 HC RX 258: Performed by: INTERNAL MEDICINE

## 2019-06-13 PROCEDURE — 36415 COLL VENOUS BLD VENIPUNCTURE: CPT

## 2019-06-13 PROCEDURE — 93281 PM DEVICE PROGR EVAL MULTI: CPT | Performed by: INTERNAL MEDICINE

## 2019-06-13 PROCEDURE — 6370000000 HC RX 637 (ALT 250 FOR IP): Performed by: INTERNAL MEDICINE

## 2019-06-13 PROCEDURE — 6360000002 HC RX W HCPCS: Performed by: NURSE ANESTHETIST, CERTIFIED REGISTERED

## 2019-06-13 RX ORDER — PROPOFOL 10 MG/ML
INJECTION, EMULSION INTRAVENOUS PRN
Status: DISCONTINUED | OUTPATIENT
Start: 2019-06-13 | End: 2019-06-13 | Stop reason: SDUPTHER

## 2019-06-13 RX ORDER — SODIUM CHLORIDE 9 MG/ML
INJECTION, SOLUTION INTRAVENOUS CONTINUOUS PRN
Status: DISCONTINUED | OUTPATIENT
Start: 2019-06-13 | End: 2019-06-13 | Stop reason: SDUPTHER

## 2019-06-13 RX ORDER — SOTALOL HYDROCHLORIDE 120 MG/1
120 TABLET ORAL EVERY OTHER DAY
Qty: 30 TABLET | Refills: 3 | Status: SHIPPED | OUTPATIENT
Start: 2019-06-14 | End: 2019-08-07 | Stop reason: SDUPTHER

## 2019-06-13 RX ADMIN — Medication 10 ML: at 09:32

## 2019-06-13 RX ADMIN — SPIRONOLACTONE 25 MG: 25 TABLET ORAL at 09:32

## 2019-06-13 RX ADMIN — Medication 4000 UNITS: at 09:32

## 2019-06-13 RX ADMIN — SODIUM CHLORIDE: 9 INJECTION, SOLUTION INTRAVENOUS at 08:06

## 2019-06-13 RX ADMIN — METOPROLOL SUCCINATE 100 MG: 100 TABLET, EXTENDED RELEASE ORAL at 09:32

## 2019-06-13 RX ADMIN — PRAVASTATIN SODIUM 40 MG: 20 TABLET ORAL at 09:32

## 2019-06-13 RX ADMIN — APIXABAN 2.5 MG: 2.5 TABLET, FILM COATED ORAL at 09:31

## 2019-06-13 RX ADMIN — PROPOFOL 50 MG: 10 INJECTION, EMULSION INTRAVENOUS at 08:18

## 2019-06-13 RX ADMIN — LEVOTHYROXINE SODIUM 125 MCG: 125 TABLET ORAL at 05:59

## 2019-06-13 RX ADMIN — FUROSEMIDE 40 MG: 40 TABLET ORAL at 09:32

## 2019-06-13 ASSESSMENT — PAIN SCALES - GENERAL: PAINLEVEL_OUTOF10: 0

## 2019-06-13 ASSESSMENT — ENCOUNTER SYMPTOMS: SHORTNESS OF BREATH: 1

## 2019-06-13 NOTE — ANESTHESIA PRE PROCEDURE
40 mg Oral Daily Myles Hammed, DO   40 mg at 06/12/19 1232    levothyroxine (SYNTHROID) tablet 125 mcg  125 mcg Oral Daily Myles Hammed, DO   125 mcg at 06/13/19 0559    LORazepam (ATIVAN) tablet 0.5 mg  0.5 mg Oral Daily PRN Myles Hammed, DO   0.5 mg at 06/12/19 2041    metoprolol succinate (TOPROL XL) extended release tablet 100 mg  100 mg Oral BID Myles Hammed, DO   100 mg at 06/12/19 2041    pravastatin (PRAVACHOL) tablet 40 mg  40 mg Oral Daily Myles Hammed, DO   40 mg at 06/12/19 1232    spironolactone (ALDACTONE) tablet 25 mg  25 mg Oral Daily Myles Hammed, DO   25 mg at 06/12/19 1233    vitamin D tablet 4,000 Units  4,000 Units Oral Daily Myles Hammed, DO   4,000 Units at 06/12/19 1232    sodium chloride flush 0.9 % injection 10 mL  10 mL Intravenous 2 times per day Myles Hammed, DO   10 mL at 06/12/19 2041    sodium chloride flush 0.9 % injection 10 mL  10 mL Intravenous PRN Myles Hammed, DO        magnesium hydroxide (MILK OF MAGNESIA) 400 MG/5ML suspension 30 mL  30 mL Oral Daily PRN Myles Hammed, DO        acetaminophen (TYLENOL) tablet 650 mg  650 mg Oral Q4H PRN Myles Hammed, DO           Allergies: Allergies   Allergen Reactions    Amiodarone Other (See Comments)     Causes issues with liver enzymes.         Problem List:    Patient Active Problem List   Diagnosis Code    Chest pain R07.9    Persistent atrial fibrillation (HCC) I48.1    Essential hypertension I10    HLD (hyperlipidemia) E78.5    Hypothyroidism E03.9    Acute on chronic diastolic heart failure (HCC) I50.33    Pure hypercholesterolemia E78.00    Hyperthyroidism E05.90    Paced rhythm on electrocardiogram (ECG) Z78.9    Pacemaker Z95.0    Biventricular cardiac pacemaker in situ Z95.0    CKD (chronic kidney disease) N18.9       Past Medical History:        Diagnosis Date    Atrial fibrillation (HCC)     Heart palpitations     History of kidney problems     Hyperlipidemia     Hypertension     Hypothyroidism  Vertigo        Past Surgical History:        Procedure Laterality Date    CHOLECYSTECTOMY      COLONOSCOPY      CORONARY ANGIOPLASTY WITH STENT PLACEMENT Right 06/26/2017    Dr Norma Epstein Right     knee    PACEMAKER INSERTION  05/04/2012    D-PPM  Dr. Brandan Ruiz  03/11/2019    UPGRADE D-PPM TO BIV PPM / NEW LV LEAD   (Sidney Mario)   DR. Dorena Merlin SPINE SURGERY      lumbar lameinectomy with screw stablization    TRANSESOPHAGEAL ECHOCARDIOGRAM  06/23/2017    Dr. Yasmin Lizarraga SANGITA     UPPER GASTROINTESTINAL ENDOSCOPY         Social History:    Social History     Tobacco Use    Smoking status: Never Smoker    Smokeless tobacco: Never Used   Substance Use Topics    Alcohol use: No                                Counseling given: Not Answered      Vital Signs (Current):   Vitals:    06/12/19 1514 06/12/19 2000 06/13/19 0555 06/13/19 0705   BP: 110/70 98/60  103/64   Pulse: 76 74  78   Resp: 19 18     Temp: 98.4 °F (36.9 °C) 98 °F (36.7 °C)  97.6 °F (36.4 °C)   TempSrc: Oral Oral     SpO2: 95% 97%  97%   Weight:   163 lb 6.4 oz (74.1 kg)    Height:                                                  BP Readings from Last 3 Encounters:   06/13/19 103/64   05/22/19 126/70   04/25/19 118/72       NPO Status:                                                    >8 hrs                             BMI:   Wt Readings from Last 3 Encounters:   06/13/19 163 lb 6.4 oz (74.1 kg)   05/22/19 171 lb (77.6 kg)   04/25/19 170 lb (77.1 kg)     Body mass index is 25.59 kg/m².     CBC:   Lab Results   Component Value Date    WBC 9.8 06/10/2019    RBC 3.89 06/10/2019    HGB 13.4 06/10/2019    HCT 39.8 06/10/2019    .3 06/10/2019    RDW 12.5 06/10/2019     06/10/2019       CMP:   Lab Results   Component Value Date     06/12/2019    K 3.8 06/12/2019    CL 99 06/12/2019    CO2 24 06/12/2019    BUN 26 06/12/2019    CREATININE 1.4 06/12/2019    GFRAA 43 06/12/2019 LABGLOM 36 06/12/2019    LABGLOM 28 12/27/2018    GLUCOSE 94 06/12/2019    GLUCOSE 111 12/27/2018    PROT 7.6 04/25/2019    CALCIUM 9.3 06/12/2019    BILITOT 0.7 04/25/2019    ALKPHOS 109 04/25/2019    AST 21 04/25/2019    ALT 15 04/25/2019       POC Tests: No results for input(s): POCGLU, POCNA, POCK, POCCL, POCBUN, POCHEMO, POCHCT in the last 72 hours. Coags:   Lab Results   Component Value Date    PROTIME 15.8 06/22/2017    INR 1.4 06/22/2017    APTT 31.0 06/03/2017       HCG (If Applicable): No results found for: PREGTESTUR, PREGSERUM, HCG, HCGQUANT     ABGs: No results found for: PHART, PO2ART, TIF5ZRJ, IJD8REK, BEART, V7MBICOL     Type & Screen (If Applicable):  No results found for: LABABO, [address]    Anesthesia Evaluation  Patient summary reviewed and Nursing notes reviewed   history of anesthetic complications: PONV. Airway: Mallampati: II  TM distance: >3 FB   Neck ROM: full  Mouth opening: > = 3 FB Dental:    (+) upper dentures and lower dentures  Comment: Upper denture Remain in place, lower removed and at home    Pulmonary:normal exam  breath sounds clear to auscultation  (+) shortness of breath:                             Cardiovascular:  Exercise tolerance: poor (<4 METS),   (+) hypertension:, pacemaker: pacemaker, CAD:, CABG/stent (s/p angioplasty with stent):, dysrhythmias: atrial fibrillation, CHF: diastolic, GARG:,         Rhythm: regular  Rate: normal                    Neuro/Psych:                ROS comment: Vertigo  LBP with sx hx  R knee replacement GI/Hepatic/Renal:            ROS comment: S/p loly.    Endo/Other:    (+) hypothyroidism, hyperthyroidism::., .                 Abdominal:   (+) obese,         Vascular:                                     Jaimee Higgins MD   Echo Complete   Order: [id number]   Status:  Final result   Visible to patient:  No (Not Released) Next appt:  03/25/2019 at 10:00 AM in Cardiac Electrophysiology (SCHEDULE, DEVICE CLINIC [facility] [place])      Narrative Name: Marissa Espinosa                                            Phys: Jesse Martin                                            : 1935 Age: 81      Sex: F                                            Acct: [de-identified] Loc: CARD                                                Exam Date: 02/15/2019 Status: REG CLI                                            Radiology No: [id number]                                            Unit No: [id number]                    EXAM#     TYPE/EXAM                       RESULT                        [id number] ECHO/ECHOCARDIOGRAM COMPLETE    SEE REPORT                                    --------------- APPROVED REPORT --------------                       EXAM: Comprehensive 2D, Doppler, and color-flow        Echocardiogram               Patient Location: Inpatient               Blood Pressure: 104/64 mmHg       HR: 70 bpm       Rhythm: AF,Pacemaker               Other Information        Study Quality: Fair       Technically limited study due to  inability to position patient has        vertigo laying down. .               Indications       DX:  ACUTE ON CHRONIC DIASTOLIC HEART FAILURE               2D Dimensions       RVDd:  3.2 cm   LVEF(%):  40.7 (>50%)       IVSd:  1.1 (0.7-1.1cm)  LVOT Diam:  2.3 (1.8-2.4cm)        LVDd:  4.6 cm          PWd:  1.0 (0.7-1.1cm)  Ascending Aorta:  2.9 cm       LVDs:  3.9 (2.5-4.0cm)         Left Atrium:  4.4 (2.7-4.0cm) TAPSE:  1.3 (<1.7)           Stanford's LVEF:  40.7 %       LV Single Plane 4CH:  44.0 %                M-Mode Dimensions       RVDd:  1.3 (2.1-3.2cm) Left Atrium:  4.5 (2.5-4.0cm)       IVSd:  1.3 (0.7-1.1cm) Aortic Root:  3.2 (2.2-3.7cm)       LVDd:  4.9 (4.0-5.6cm) Aortic Cusp Exc.:  1.4 (1.5-2.0cm)       PWd:  1.2 (0.7-1.1cm) MV EPSS:  1.6 (<0.5cm)          FS(%):  28.3 %       LVDs:  3.5 (2.0-3.8cm) ESV (Teich):  50.9 ml       LVEF(%):  44.4 (>50%)           PAGE 1               Signed Report                     (CONTINUED)                                            Name: Jordan Cole                                            Phys: Shazia Saeed                                            : 1935 Age: 81      Sex: F                                            Acct: [de-identified] Loc: CARD                                                Exam Date: 02/15/2019 Status: REG CLI                                            Radiology No: [id number]                                            Unit No: [id number]                    EXAM#     TYPE/EXAM                       RESULT                        [id number] ECHO/ECHOCARDIOGRAM COMPLETE    SEE REPORT                           <Continued>                  Volumes       Left Atrial Volume (Systole)        Single Plane 4CH:  69.0 mL Single Plane 2CH:  65.0 mL       Biplane LA Volume:  67.0 mL LA ESV Index:  36.0 mL/m2               Aortic Valve       AO Mean Gr.:  2.0 mmHg LV Mean P.0 mmHg       AO V2 Mean:  0.6 m/s        AO V2 VTI:  16.4 cm LV Mean:  0.4 m/s       BALJEET (VTI):  3.0 cm2 LV V1 VTI:  11.9 cm               Mitral Valve          MV PHT:  54.0 ms       MV Mean Gr.:  3.0 mmHg MVA (PHT):  4.1 cm2       MV E Max Albert.:  1.3 m/s           MV Decel. Time:  261.0 ms       MV VTI:  33.8 cm                TDI       E/Lateral E':  13.0 E/Medial E':  13.0       E':  59.5  Medial E' Albert.:  0.1 m/s          Lateral E' Albert.:  0.1 m/s               Pulmonary Valve       PV Peak Albert.:  0.6 m/s PV Peak Gr.:  2.0 mmHg               Tricuspid Valve       TR Peak Gr.:  29.0 mmHg                Left Ventricle       The left ventricle is normal size. There is global hypokinesis of the        left ventricle. Mild concentric left ventricular hypertrophy. LVEF is        45%. Grade II - pseudonormal filling dynamics. Right Ventricle       Right ventricle is moderately dilated. The right ventricle is normal        size.  The right ventricular systolic function is normal. Device lead                      Anesthesia Plan      MAC     ASA 3       Induction: intravenous. Anesthetic plan and risks discussed with patient. Use of blood products discussed with patient whom. Plan discussed with attending.                   Erwin Miller DO   6/13/2019

## 2019-06-13 NOTE — ANESTHESIA PRE PROCEDURE
Department of Anesthesiology  Preprocedure Note       Name:  Billie Mensah   Age:  81 y.o.  :  1935                                          MRN:  [id number]         Date:  2019      Surgeon: Michael Lloyd    Procedure: CARDIOVERSION WITH ANESTHESIA    Medications prior to admission:   Prior to Admission medications    Medication Sig Start Date End Date Taking?  Authorizing Provider   apixaban (ELIQUIS) 2.5 MG TABS tablet Take 1 tablet by mouth 2 times daily Changed to 90 day script 3/29/19  Yes Radha Spann APRN - CNP   metoprolol succinate (TOPROL XL) 100 MG extended release tablet Take 1 tablet by mouth 2 times daily 18  Yes Otto Ríos MD   furosemide (LASIX) 20 MG tablet Take 2 tablets by mouth daily 18  Yes Otto Ríos MD   diltiazem (CARDIZEM CD) 360 MG extended release capsule Take 1 capsule by mouth daily 18  Yes Lb Olivera DO   pravastatin (PRAVACHOL) 40 MG tablet Take 1 tablet by mouth daily 18  Yes Lb Olivera DO   spironolactone (ALDACTONE) 25 MG tablet Take 1 tablet by mouth daily 18  Yes Lb Olivera DO   levothyroxine (SYNTHROID) 125 MCG tablet Take 125 mcg by mouth Daily   Yes Historical Provider, MD   vitamin D (CHOLECALCIFEROL) 400 UNITS TABS tablet Take 4,000 Units by mouth daily    Yes Historical Provider, MD   LORazepam (ATIVAN) 0.5 MG tablet TAKE ONE TABLET BY MOUTH DAILY AS NEEDED 1/10/19   Historical Provider, MD       Current medications:    Current Facility-Administered Medications   Medication Dose Route Frequency Provider Last Rate Last Dose    sotalol (BETAPACE) tablet 120 mg  120 mg Oral Every Other Day Bobo Murrieta MD   120 mg at 19    apixaban (ELIQUIS) tablet 2.5 mg  2.5 mg Oral BID Teddy Lam DO   2.5 mg at 19    diltiazem (CARDIZEM CD) extended release capsule 360 mg  360 mg Oral Daily Teddy Lam DO   360 mg at 19 152    furosemide (LASIX) tablet 40 mg  40 mg Oral Daily Caralyn , DO   40 mg at 06/12/19 1232    levothyroxine (SYNTHROID) tablet 125 mcg  125 mcg Oral Daily Caralyn , DO   125 mcg at 06/13/19 0559    LORazepam (ATIVAN) tablet 0.5 mg  0.5 mg Oral Daily PRN Caralyn , DO   0.5 mg at 06/12/19 2041    metoprolol succinate (TOPROL XL) extended release tablet 100 mg  100 mg Oral BID Caralyn , DO   100 mg at 06/12/19 2041    pravastatin (PRAVACHOL) tablet 40 mg  40 mg Oral Daily Caralyn , DO   40 mg at 06/12/19 1232    spironolactone (ALDACTONE) tablet 25 mg  25 mg Oral Daily Caralyn , DO   25 mg at 06/12/19 1233    vitamin D tablet 4,000 Units  4,000 Units Oral Daily Caralyn , DO   4,000 Units at 06/12/19 1232    sodium chloride flush 0.9 % injection 10 mL  10 mL Intravenous 2 times per day Caralyn , DO   10 mL at 06/12/19 2041    sodium chloride flush 0.9 % injection 10 mL  10 mL Intravenous PRN Caralyn , DO        magnesium hydroxide (MILK OF MAGNESIA) 400 MG/5ML suspension 30 mL  30 mL Oral Daily PRN Caralyn , DO        acetaminophen (TYLENOL) tablet 650 mg  650 mg Oral Q4H PRN Caralyn , DO           Allergies: Allergies   Allergen Reactions    Amiodarone Other (See Comments)     Causes issues with liver enzymes.         Problem List:    Patient Active Problem List   Diagnosis Code    Chest pain R07.9    Persistent atrial fibrillation (HCC) I48.1    Essential hypertension I10    HLD (hyperlipidemia) E78.5    Hypothyroidism E03.9    Acute on chronic diastolic heart failure (HCC) I50.33    Pure hypercholesterolemia E78.00    Hyperthyroidism E05.90    Paced rhythm on electrocardiogram (ECG) Z78.9    Pacemaker Z95.0    Biventricular cardiac pacemaker in situ Z95.0    CKD (chronic kidney disease) N18.9       Past Medical History:        Diagnosis Date    Atrial fibrillation (HCC)     Heart palpitations     History of kidney problems     Hyperlipidemia     Hypertension     Hypothyroidism     Vertigo Past Surgical History:        Procedure Laterality Date    CHOLECYSTECTOMY      COLONOSCOPY      CORONARY ANGIOPLASTY WITH STENT PLACEMENT Right 06/26/2017    Dr Khoury Right     knee    PACEMAKER INSERTION  05/04/2012    D-PPM  Dr. Willi Stout  03/11/2019    UPGRADE D-PPM TO BIV PPM / NEW LV LEAD   (Alla Bond)   DR. Juliana Agustin SPINE SURGERY      lumbar lameinectomy with screw stablization    TRANSESOPHAGEAL ECHOCARDIOGRAM  06/23/2017    Dr. Inna Quiroz SANGITA     UPPER GASTROINTESTINAL ENDOSCOPY         Social History:    Social History     Tobacco Use    Smoking status: Never Smoker    Smokeless tobacco: Never Used   Substance Use Topics    Alcohol use: No                                Counseling given: Not Answered      Vital Signs (Current):   Vitals:    06/12/19 1115 06/12/19 1514 06/12/19 2000 06/13/19 0555   BP: 134/64 110/70 98/60    Pulse: 78 76 74    Resp: 18 19 18    Temp: 36.4 °C (97.6 °F) 36.9 °C (98.4 °F) 36.7 °C (98 °F)    TempSrc: Temporal Oral Oral    SpO2: 98% 95% 97%    Weight:    163 lb 6.4 oz (74.1 kg)   Height:                                                  BP Readings from Last 3 Encounters:   06/12/19 98/60   05/22/19 126/70   04/25/19 118/72       NPO Status:  >12 hrs solid food, sip water this am with meds                                                                               BMI:   Wt Readings from Last 3 Encounters:   06/13/19 163 lb 6.4 oz (74.1 kg)   05/22/19 171 lb (77.6 kg)   04/25/19 170 lb (77.1 kg)     Body mass index is 25.59 kg/m².     CBC:   Lab Results   Component Value Date    WBC 9.8 06/10/2019    RBC 3.89 06/10/2019    HGB 13.4 06/10/2019    HCT 39.8 06/10/2019    .3 06/10/2019    RDW 12.5 06/10/2019     06/10/2019       CMP:   Lab Results   Component Value Date     06/12/2019    K 3.8 06/12/2019    CL 99 06/12/2019    CO2 24 06/12/2019    BUN 26 06/12/2019    CREATININE 1.4 2019    GFRAA 43 2019    LABGLOM 36 2019    LABGLOM 28 2018    GLUCOSE 94 2019    GLUCOSE 111 2018    PROT 7.6 2019    CALCIUM 9.3 2019    BILITOT 0.7 2019    ALKPHOS 109 2019    AST 21 2019    ALT 15 2019       POC Tests: No results for input(s): POCGLU, POCNA, POCK, POCCL, POCBUN, POCHEMO, POCHCT in the last 72 hours. Coags:   Lab Results   Component Value Date    PROTIME 15.8 2017    INR 1.4 2017    APTT 31.0 2017       HCG (If Applicable): No results found for: PREGTESTUR, PREGSERUM, HCG, HCGQUANT     ABGs: No results found for: PHART, PO2ART, BXO8EFC, EDC9MCI, BEART, M1AXNKBW     Type & Screen (If Applicable):  No results found for: [facility]    Anesthesia Evaluation  Patient summary reviewed and Nursing notes reviewed no history of anesthetic complications:   Airway: Mallampati: II  TM distance: >3 FB   Neck ROM: full  Mouth opening: > = 3 FB Dental:    (+) upper dentures and edentulous  Comment: Upper denture tight fitting , remains in place    Pulmonary:normal exam                               Cardiovascular:  Exercise tolerance: poor (<4 METS),   (+) hypertension:, pacemaker: pacemaker, CAD:, CABG/stent (stent):, dysrhythmias: atrial fibrillation, CHF: diastolic,         Rhythm: regular  Rate: normal                   PE comment: AF/KY   Neuro/Psych:                ROS comment: S/p lumbar spine surgery  C/o LBP  S/p R     TKR GI/Hepatic/Renal:   (+) renal disease: CRI,          ROS comment: S/p loly.    Endo/Other:    (+) hypothyroidism, hyperthyroidism, blood dyscrasia: anticoagulation therapy, arthritis: OA., .                 Abdominal:           Vascular:                                        Patient MRN:  [id number]   : 1935   Age: 81 years   Gender: Female   Order Date:  2019 6:00 AM   EXAM: NM MYOCARDIAL SPECT REST EXERCISE OR RX   Number of Images: 8 views   INDICATION:  Shortness of breath Reason for Exam?->Shortness of breath   Procedure Type->Rx    COMPARISON: Janet 3, 2017. TECHNIQUE:   12 mCi of Tc-99m MIBI was injected intravenously at rest and cardiac   SPECT images were performed. In addition 35 mCi of Tc-99m MIBI was   injected intravenously at maximum stress by using Lexiscan. Stress   SPECT images and gated study were performed. FINDINGS:   Perfusion images demonstrate no reversible perfusion defect. Mild inferior hypokinesis and also apical dyskinesis are noted. Wall   motion is otherwise within normal limits. The end diastolic volume is 54 ml. The end systolic volume is 16 ml. The estimated ejection fraction is 70  %. Impression   1. No reversible perfusion defect   2. Ejection fraction is 70 %. 3. Mild inferior hypokinesis and apical dyskinesis. Patient MRN: [id number]       : 1935       Age:  81 years       Gender: Female       Order Date: 3/12/2019 6:00 AM       Exam: XR CHEST PORTABLE       Number of Views: 1       Indication:   crt-p upgrade           Comparison: 3/11/2019       Findings: There is a stable, enlarged cardiomediastinal silhouette   with stable appearance of a multilead cardiac pacing device with leads   in indeterminate locations, thoracic aortic vascular calcifications   with left basilar airspace disease. Small amount of bilateral pleural   fluid. Remona Mellow No pneumothorax. . Vascular calcifications thoracic aorta. Impression   1. Stable, enlarged cardiomediastinal silhouette with thoracic aortic   vascular calcifications, left basilar airspace disease and small   amounts of bilateral pleural fluid. Narrative   Performed by: Dolores Matthews rhythm  Biventricular pacemaker detected  Abnormal ECG  When compared with ECG of 2019 00:12,  No significant change was found     MD   Echo Complete   Order: [id number]   Status:  Final result   Visible to patient:  No (Not Released) Next appt:   Today at 07:30 AM in IP Unit [facility])      Narrative                                               Name: Marnell Lennox                                            Phys: Ana Rosa Shanonn                                            : 1935 Age: 81      Sex: F                                            Acct: [de-identified] Loc: CARD                                                Exam Date: 02/15/2019 Status: REG CLI                                            Radiology No: [id number]                                            Unit No: [id number]                    EXAM#     TYPE/EXAM                       RESULT                        [id number] ECHO/ECHOCARDIOGRAM COMPLETE    SEE REPORT                                    --------------- APPROVED REPORT --------------                       EXAM: Comprehensive 2D, Doppler, and color-flow        Echocardiogram               Patient Location: Inpatient               Blood Pressure: 104/64 mmHg       HR: 70 bpm       Rhythm: AF,Pacemaker               Other Information        Study Quality: Fair       Technically limited study due to  inability to position patient has        vertigo laying down. .               Indications       DX:  ACUTE ON CHRONIC DIASTOLIC HEART FAILURE               2D Dimensions       RVDd:  3.2 cm   LVEF(%):  40.7 (>50%)       IVSd:  1.1 (0.7-1.1cm)  LVOT Diam:  2.3 (1.8-2.4cm)        LVDd:  4.6 cm          PWd:  1.0 (0.7-1.1cm)  Ascending Aorta:  2.9 cm       LVDs:  3.9 (2.5-4.0cm)         Left Atrium:  4.4 (2.7-4.0cm) TAPSE:  1.3 (<1.7)           Stanford's LVEF:  40.7 %       LV Single Plane 4CH:  44.0 %                M-Mode Dimensions       RVDd:  1.3 (2.1-3.2cm) Left Atrium:  4.5 (2.5-4.0cm)       IVSd:  1.3 (0.7-1.1cm) Aortic Root:  3.2 (2.2-3.7cm)       LVDd:  4.9 (4.0-5.6cm) Aortic Cusp Exc.:  1.4 (1.5-2.0cm)       PWd:  1.2 (0.7-1.1cm) MV EPSS:  1.6 (<0.5cm)          FS(%):  28.3 %       LVDs:  3.5 (2.0-3.8cm) ESV (Teich): 50.9 ml       LVEF(%):  44.4 (>50%)           PAGE 1               Signed Report                     (CONTINUED)                                            Name: Jaron Hernandez                                            Phys: Hugh Avila                                            : 1935 Age: 81      Sex: F                                            Acct: [de-identified] Loc: CARD                                                Exam Date: 02/15/2019 Status: REG CLI                                            Radiology No: [id number]                                            Unit No: [id number]                    EXAM#     TYPE/EXAM                       RESULT                        [id number] ECHO/ECHOCARDIOGRAM COMPLETE    SEE REPORT                           <Continued>                  Volumes       Left Atrial Volume (Systole)        Single Plane 4CH:  69.0 mL Single Plane 2CH:  65.0 mL       Biplane LA Volume:  67.0 mL LA ESV Index:  36.0 mL/m2               Aortic Valve       AO Mean Gr.:  2.0 mmHg LV Mean P.0 mmHg       AO V2 Mean:  0.6 m/s        AO V2 VTI:  16.4 cm LV Mean:  0.4 m/s       BALJEET (VTI):  3.0 cm2 LV V1 VTI:  11.9 cm               Mitral Valve          MV PHT:  54.0 ms       MV Mean Gr.:  3.0 mmHg MVA (PHT):  4.1 cm2       MV E Max Albert.:  1.3 m/s           MV Decel. Time:  261.0 ms       MV VTI:  33.8 cm                TDI       E/Lateral E':  13.0 E/Medial E':  13.0       E':  59.5  Medial E' Albert.:  0.1 m/s          Lateral E' Albert.:  0.1 m/s               Pulmonary Valve       PV Peak Albert.:  0.6 m/s PV Peak Gr.:  2.0 mmHg               Tricuspid Valve       TR Peak Gr.:  29.0 mmHg                Left Ventricle       The left ventricle is normal size. There is global hypokinesis of the        left ventricle. Mild concentric left ventricular hypertrophy. LVEF is        45%. Grade II - pseudonormal filling dynamics.                Right Ventricle       Right ventricle is moderately dilated. The right ventricle is normal        size. The right ventricular systolic function is normal. Device lead           PAGE 2               Signed Report                     (CONTINUED)                                            Name: Sharan Gee                                            Phys: Nikki Hernandez                                            : 1935 Age: 81      Sex: F                                            Acct: [de-identified] Loc: CARD                                                Exam Date: 02/15/2019 Status: REG CLI                                            Radiology No: [id number]                                            Unit No: [id number]                    EXAM#     TYPE/EXAM                       RESULT                        [id number] ECHO/ECHOCARDIOGRAM COMPLETE    SEE REPORT                           <Continued>          is present in the right ventricle. Atria       Left atrium is mildly dilated. Interatrial septum not well        visualized. Right atrium size is normal.               Aortic Valve       The Aortic valve is sclerotic. Mitral Valve       There is mitral annular calcification. Mild to moderate mitral        regurgitation. Tricuspid Valve       The tricuspid valve is normal in structure. Moderate tricuspid        regurgitation, RVSP 34mmHg               Pulmonic Valve       Pulmonic valve is not well visualized. Mild pulmonic        regurgitation. Great Vessels       Aortic root is normal in size. IVC is not well        visualized. Pericardium       Mild anterior pericardial effusion. Critical Notification       Critical Value: No               <Conclusion>       The left ventricle is normal size. There is global hypokinesis of the left ventricle, LVEF is 45 +/-        3%       Mild concentric left ventricular hypertrophy.        Grade II - pseudonormal filling dynamics. Left atrium is mildly dilated. PAGE 3               Signed Report                     (CONTINUED)                                            Name: Jaron Hernandez                                            Phys: Hugh Avila                                            : 1935 Age: 81      Sex: F                                            Acct: [de-identified] Loc: CARD                                                Exam Date: 02/15/2019 Status: REG CLI                                            Radiology No: [id number]                                            Unit No: [id number]                    EXAM#     TYPE/EXAM                       RESULT                        [id number] ECHO/ECHOCARDIOGRAM COMPLETE    SEE REPORT                           <Continued>          There is mitral annular calcification. Mild to moderate mitral regurgitation. Moderate tricuspid regurgitation, RVSP 34mmHG. .       Mild pulmonic regurgitation. Aortic root is normal in size. Small pericardial effusion. No intracardiac mass. Prior echo in 2017. TDS unable to tolerate laying flat due to vertigo.                                       ** REPORT SIGNED IN OTHER VENDOR SYSTEM 02/15/2019 **                      Reported By: Andrea Ervin MD                                                                CC: Gina Stephenson MD                   Technologist: Governor Neither       Transcribed Date/Time: 02/15/2019 (1040)       : KEVIN       Printed Date/Time: 02/15/2019 (1040)          PAGE 4               Signed Report      Specimen Collected: 02/15/19 10:39 Last Resulted: 02/15/19 10:40 Order Details View Encounter Lab and Collection Details Routing Result History              Terri Xavier MD   Echo Complete   Order: [id number]   Status:  Final result   Visible to patient:  No (Not Released) Next appt:  2019 at 10:00 AM in Cardiac Electrophysiology (SCHEDULE, DEVICE CLINIC [facility] [place])      Narrative                                               Name: Therese Johnston                                            Phys: Nakia aPlacio                                            : 1935 Age: 81      Sex: F                                            Acct: [de-identified] Loc: CARD                                                Exam Date: 02/15/2019 Status: REG CLI                                            Radiology No: [id number]                                            Unit No: [id number]                    EXAM#     TYPE/EXAM                       RESULT                        [id number] ECHO/ECHOCARDIOGRAM COMPLETE    SEE REPORT                                    --------------- APPROVED REPORT --------------                       EXAM: Comprehensive 2D, Doppler, and color-flow        Echocardiogram               Patient Location: Inpatient               Blood Pressure: 104/64 mmHg       HR: 70 bpm       Rhythm: AF,Pacemaker               Other Information        Study Quality: Fair       Technically limited study due to  inability to position patient has        vertigo laying down. .               Indications       DX:  ACUTE ON CHRONIC DIASTOLIC HEART FAILURE               2D Dimensions       RVDd:  3.2 cm   LVEF(%):  40.7 (>50%)       IVSd:  1.1 (0.7-1.1cm)  LVOT Diam:  2.3 (1.8-2.4cm)        LVDd:  4.6 cm          PWd:  1.0 (0.7-1.1cm)  Ascending Aorta:  2.9 cm       LVDs:  3.9 (2.5-4.0cm)         Left Atrium:  4.4 (2.7-4.0cm) TAPSE:  1.3 (<1.7)           Stanford's LVEF:  40.7 %       LV Single Plane 4CH:  44.0 %                M-Mode Dimensions       RVDd:  1.3 (2.1-3.2cm) Left Atrium:  4.5 (2.5-4.0cm)       IVSd:  1.3 (0.7-1.1cm) Aortic Root:  3.2 (2.2-3.7cm)       LVDd:  4.9 (4.0-5.6cm) Aortic Cusp Exc.:  1.4 (1.5-2.0cm)       PWd:  1.2 (0.7-1.1cm) MV EPSS:  1.6 (<0.5cm)          FS(%):  28.3 %       LVDs:  3.5 (2.0-3.8cm) ESV (Teich):  50.9 ml       LVEF(%):  44.4 (>50%)           PAGE 1               Signed Report                     (CONTINUED)                                            Name: Belen Brunner                                            Phys: Destini Bernal                                            : 1935 Age: 81      Sex: F                                            Acct: [de-identified] Loc: CARD                                                Exam Date: 02/15/2019 Status: REG CLI                                            Radiology No: [id number]                                            Unit No: [id number]                    EXAM#     TYPE/EXAM                       RESULT                        [id number] ECHO/ECHOCARDIOGRAM COMPLETE    SEE REPORT                           <Continued>                  Volumes       Left Atrial Volume (Systole)        Single Plane 4CH:  69.0 mL Single Plane 2CH:  65.0 mL       Biplane LA Volume:  67.0 mL LA ESV Index:  36.0 mL/m2               Aortic Valve       AO Mean Gr.:  2.0 mmHg LV Mean P.0 mmHg       AO V2 Mean:  0.6 m/s        AO V2 VTI:  16.4 cm LV Mean:  0.4 m/s       BALJEET (VTI):  3.0 cm2 LV V1 VTI:  11.9 cm               Mitral Valve          MV PHT:  54.0 ms       MV Mean Gr.:  3.0 mmHg MVA (PHT):  4.1 cm2       MV E Max Albert.:  1.3 m/s           MV Decel. Time:  261.0 ms       MV VTI:  33.8 cm                TDI       E/Lateral E':  13.0 E/Medial E':  13.0       E':  59.5  Medial E' Albert.:  0.1 m/s          Lateral E' Albert.:  0.1 m/s               Pulmonary Valve       PV Peak Albert.:  0.6 m/s PV Peak Gr.:  2.0 mmHg               Tricuspid Valve       TR Peak Gr.:  29.0 mmHg                Left Ventricle       The left ventricle is normal size. There is global hypokinesis of the        left ventricle. Mild concentric left ventricular hypertrophy. LVEF is        45%. Grade II - pseudonormal filling dynamics.                Right Ventricle       Right ventricle is moderately dilated. The right ventricle is normal        size. The right ventricular systolic function is normal. Device lead                    Anesthesia Plan      MAC     ASA 4       Induction: intravenous. Anesthetic plan and risks discussed with patient. Use of blood products discussed with patient whom. Plan discussed with attending.                   THEO Patino - OBDULIA   6/13/2019

## 2019-06-13 NOTE — ANESTHESIA POSTPROCEDURE EVALUATION
Department of Anesthesiology  Postprocedure Note    Patient: Claudia Arora  MRN: [id number]  YOB: 1935  Date of evaluation: 6/13/2019  Time:  9:58 AM     Procedure Summary     Date:  06/13/19 Room / Location:  [facility] [place] [facility] [facility]    Anesthesia Start:  0804 Anesthesia Stop:  5402    Procedure:  CARDIOVERSION WITH ANESTHESIA Diagnosis:  Atrial fibrillation ([address]    Scheduled Providers:   Responsible Provider:  Melvin Garcia DO    Anesthesia Type:  MAC ASA Status:  3          Anesthesia Type: MAC    Jossie Phase I:      Jossie Phase II:      Last vitals: Reviewed and per EMR flowsheets.        Anesthesia Post Evaluation    Patient location during evaluation: bedside  Patient participation: complete - patient cannot participate  Level of consciousness: awake and alert  Airway patency: patent  Nausea & Vomiting: no nausea and no vomiting  Complications: no  Cardiovascular status: blood pressure returned to baseline  Respiratory status: acceptable  Hydration status: euvolemic

## 2019-06-13 NOTE — DISCHARGE SUMMARY
[facility] [place] Cardiac Electrophysiology Discharge Summary    Nicole Huntley  1935    81 y.o.  female  PCP: Clinton Escamilla MD    Admission Date:6/10/2019      Discharge Date:  06/13/19    Patient Active Problem List   Diagnosis    Chest pain    Persistent [facility] fibrillation ([facility]    Essential hypertension    HLD (hyperlipidemia)    Hypothyroidism    Acute on chronic diastolic heart failure ([facility]    Pure hypercholesterolemia    Hyperthyroidism    Paced rhythm on electrocardiogram (ECG)    Pacemaker    Biventricular cardiac pacemaker in situ    CKD (chronic kidney disease)        [facility] Medication Instructions SZO:[id number]    Printed on:06/13/19 9329   Medication Information                      apixaban (ELIQUIS) 2.5 MG TABS tablet  Take 1 tablet by mouth 2 times daily Changed to 90 day script             diltiazem (CARDIZEM CD) 360 MG extended release capsule  Take 1 capsule by mouth daily             furosemide (LASIX) 20 MG tablet  Take 2 tablets by mouth daily             levothyroxine (SYNTHROID) 125 MCG tablet  Take 125 mcg by mouth Daily             LORazepam (ATIVAN) 0.5 MG tablet  TAKE ONE TABLET BY MOUTH DAILY AS NEEDED             metoprolol succinate (TOPROL XL) 100 MG extended release tablet  Take 1 tablet by mouth 2 times daily             pravastatin (PRAVACHOL) 40 MG tablet  Take 1 tablet by mouth daily             sotalol (BETAPACE) 120 MG tablet  Take 1 tablet by mouth every other day             spironolactone (ALDACTONE) 25 MG tablet  Take 1 tablet by mouth daily             vitamin D (CHOLECALCIFEROL) 400 UNITS TABS tablet  Take 4,000 Units by mouth daily                  Hospital Course:    This is a 81 y.o. female with a history of       Patient Active Problem List   Diagnosis    Chest pain    Persistent atrial fibrillation ([facility] [facility]    Essential hypertension    HLD (hyperlipidemia)    Hypothyroidism    Acute on chronic diastolic heart failure ([facility] [address]    Pure hypercholesterolemia    Hyperthyroidism    Paced rhythm on electrocardiogram (ECG)    Pacemaker    Status post biventricular pacemaker    CKD (chronic kidney disease)   who presents to the hospital for Sotalol initiation for treatment of her persistent atrial fibrillation. The patient has had history of complete AV block status post dual chamber permanent pacemaker implantation, persistent atrial fibrillation and CAD s/p PCI. The patient was on Digoxin, Diltiazem and Metoprolol for rate control but developed side effect with Digoxin and it was stopped. She was also strated on Amiodarone in the past but it was discontinued due to marked elevation of liver enzymes. She reports that she has been feeling poorly. She states she feels increase GARG and dizziness. She denies any chest pain or palpitations, syncope, orthopnea or paroxysmal nocturnal dyspnea. She was found to have SARITHA of the pacemaker pulse generator. Echocardiogram showed LV EF of 45% on 2/15/19. Due to frequent RV pacing > 40%, she subsequently underwent CRT-P upgrade on 3/11/19. Since the upgrade, her GARG remained unchanged. She underwent PFT and CT of lung which showed mild restrictive lung disease with decrease lung diffusion and mild pulmonary fibrotic change, respectively. She is scheduled for Sotalol admission yesterday. Due to her CrCl of 28 mL/min, she was started on Sotalol 120 mg every other day. Her QTc remained stable and underwent CV today which was successful. Her CRT-P function is stable and her QTc at discharge is 515 ms(BiV paced). Procedures Performed: 1. CV--External. 2. CRT-P interrogation/reprogramming. Disposition: The patient was discharged to home in stable condition on the above medications. Clinical follow-up in the office in 1 week for an ECG only and 1 month for an office follow-up.       Sheldon Cristobal DO  [facility] Cardiac Electrophysiology  [address] [facility]    > 30 mins was used in preparation of the discharge summary.

## 2019-06-13 NOTE — OP NOTE
[facility] Cardiac Electrophysiology Procedure Note    Rogelio Jamil  1935    81 y.o.  female  Date of Service: 6/13/2019    Referring Provider: Eli Dial MD, Roberta Beatty MD    PROCEDURE:      1. Electrical Cardioversion  2. CRT-P interrogation with programming    INDICATION:    1. Persistent AF. 2. Sotalol AAD loading. 3. CRT-P in situ. PROCEDURE PERFORMED BY: Judson Huff DO    PROCEDURE TIME: 20 mins    COMPLICATIONS: None. ANESTHESIA: Per Anesthesia    DESCRIPTION OF PROCEDURE: The risks, benefits, and alternatives to the procedure were discussed with the patient, and informed consent was obtained. The patient was brought to the cardiovascular lab and sedated under the guidance of anesthesia. Once anesthesia was deemed adequate, a 200 J biphasic synchronous shock was applied. The CRT-P was interrogated and revealed normal function. The patient was then allowed to wake in the usual fashion. SUMMARY:    1. Successful cardioversion of persistent AF to sinus rhythm. 2. Normal CRT-P function. RECOMMENDATIONS:  1. ECG. 2. OK for discharge to home later today. 3. ECG in the office in 1 week. 4. F/U with Dr. Levi Garrison or NP in 1 month.     Judson Huff DO  [facility] Cardiac Electrophysiology  [address] [facility]

## 2019-06-14 ENCOUNTER — CARE COORDINATION (OUTPATIENT)
Dept: CASE MANAGEMENT | Age: 84
End: 2019-06-14

## 2019-06-14 NOTE — CARE COORDINATION
Explained the role of Care Transition Coordinator and the BPCI-A program. CMS BPCI-A letter reviewed and will be mailed to the patient. Patient is agreeable to follow up post discharge from the hospital. Med review completed. Patient reports pharmacy questioned administration of Sotalol 120 mg po qod with Toprol  po bid. Per patient, doctor wants patient to take both meds. Patient reports she will discuss with cardiology during appt. Patient instructed to report any lightheadedness or dizziness. Patient verbalized understanding. Due to Eliquis, bleeding precautions reviewed with patient. Patient denies any abnormal or uncontrolled bleeding. Patient instructed to report any abnormal bleeding and to call 911 for any uncontrolled bleeding. Patient verbalized understanding. Patient presented [facility] on 6/10/19 for Sotalol initiation for treatment of persistent atrial fibrillation. Cardioversion on 6/13/19. Patient denies chest pain, palpitations, swelling. Patient c/o intermittent sob that is improving. Overall, patient states, \"I feel good. \"  Patient c/o back stiffness due to hard surface she had to lie on for stress test.  Patient reports she is not taking anything for the pain. Patient drove herself to [address] [place] [address] & [place] today to  rx. Patient reports she has family that live nearby and both daughter and granddaughter are nurses. Patient reports she drives herself to her appts but her daughter will meet her at the appts. [facility] explained that a member of the Care Transition Central Team will be contacting patient for further follow up calls. Patient is in agreement and denies any further needs or concerns at this time.        Follow Up  Future Appointments   Date Time Provider Sallie Rouse   6/20/2019 10:00 AM SCHEDULE, EP LAB/MA ELECTRO PHYS [facility]   7/10/2019 11:00 AM THEO Moses ELECTRO PHYS [facility]   7/11/2019 11:00 AM SCHEDULE, REMOTE CHECK LOLA ELECTRO PHYS [facility]   8/7/2019 11:00 AM Jono Elliott, [address] TOMY Bhakta

## 2019-06-17 ENCOUNTER — TELEPHONE (OUTPATIENT)
Dept: ADMINISTRATIVE | Age: 84
End: 2019-06-17

## 2019-06-18 ENCOUNTER — TELEPHONE (OUTPATIENT)
Dept: CARDIOLOGY CLINIC | Age: 84
End: 2019-06-18

## 2019-06-18 NOTE — TELEPHONE ENCOUNTER
Fanymarcie Stanley Cruz's daughter called and Destiney Marc had her cardioversion last week and Sandra Angel feels certain her meds need adjusted since she is in regular rythym now. I have no openings with you in EL until first week of Aug. Destiney Marc passed out today but was able to get down before she went out. Can you adjust any of her meds before you see her in Aug. Sandra Angel doesn't want her taking meds she doesn't need. Please Advise.  Thank you

## 2019-06-20 ENCOUNTER — NURSE ONLY (OUTPATIENT)
Dept: NON INVASIVE DIAGNOSTICS | Age: 84
End: 2019-06-20
Payer: MEDICARE

## 2019-06-20 DIAGNOSIS — Z95.0 STATUS POST BIVENTRICULAR PACEMAKER: Primary | ICD-10-CM

## 2019-06-20 DIAGNOSIS — I48.91 ATRIAL FIBRILLATION STATUS POST CARDIOVERSION (HCC): ICD-10-CM

## 2019-06-20 DIAGNOSIS — Z79.899 ENCOUNTER FOR MONITORING SOTALOL THERAPY: Primary | ICD-10-CM

## 2019-06-20 DIAGNOSIS — I50.33 ACUTE ON CHRONIC DIASTOLIC HEART FAILURE (HCC): ICD-10-CM

## 2019-06-20 DIAGNOSIS — R06.02 SOB (SHORTNESS OF BREATH): ICD-10-CM

## 2019-06-20 DIAGNOSIS — Z51.81 ENCOUNTER FOR MONITORING SOTALOL THERAPY: Primary | ICD-10-CM

## 2019-06-20 PROCEDURE — 93281 PM DEVICE PROGR EVAL MULTI: CPT | Performed by: INTERNAL MEDICINE

## 2019-06-20 PROCEDURE — 93000 ELECTROCARDIOGRAM COMPLETE: CPT | Performed by: INTERNAL MEDICINE

## 2019-06-20 RX ORDER — LEVOTHYROXINE SODIUM 0.1 MG/1
TABLET ORAL
Refills: 1 | COMMUNITY
Start: 2019-06-14 | End: 2020-09-23 | Stop reason: DRUGHIGH

## 2019-06-20 NOTE — PROGRESS NOTES
1 week f/u ekg performed today. S/p CV on 6/13/19 and sotalol initiation. Patient c/o dizziness, disorientation, can't focus since per pacemaker battery change a month ago. B/p today was 120/70. Ekg was normal sinus rhythm. PM interrogated today. Dr. Vickie Hall reviewed.

## 2019-06-20 NOTE — PROGRESS NOTES
See [facility] [place] report. Pt here for EKG, requested device check to be done due to continued c/o SOB and \"shakiness\" - see notes. No true AF since DCCV on 6-13-19. Reviewed with Dr. Mita Thompson.      Ramsey Oneal RN, BSN  [facility]

## 2019-07-03 ENCOUNTER — TELEPHONE (OUTPATIENT)
Dept: NON INVASIVE DIAGNOSTICS | Age: 84
End: 2019-07-03

## 2019-07-10 ENCOUNTER — OFFICE VISIT (OUTPATIENT)
Dept: NON INVASIVE DIAGNOSTICS | Age: 84
End: 2019-07-10
Payer: MEDICARE

## 2019-07-10 VITALS
RESPIRATION RATE: 20 BRPM | DIASTOLIC BLOOD PRESSURE: 76 MMHG | HEART RATE: 75 BPM | SYSTOLIC BLOOD PRESSURE: 130 MMHG | HEIGHT: 67 IN | BODY MASS INDEX: 26.68 KG/M2 | WEIGHT: 170 LBS

## 2019-07-10 DIAGNOSIS — Z95.0 STATUS POST BIVENTRICULAR PACEMAKER: Primary | ICD-10-CM

## 2019-07-10 DIAGNOSIS — I48.19 PERSISTENT ATRIAL FIBRILLATION (HCC): Chronic | ICD-10-CM

## 2019-07-10 PROCEDURE — G8400 PT W/DXA NO RESULTS DOC: HCPCS | Performed by: NURSE PRACTITIONER

## 2019-07-10 PROCEDURE — 1036F TOBACCO NON-USER: CPT | Performed by: NURSE PRACTITIONER

## 2019-07-10 PROCEDURE — 4040F PNEUMOC VAC/ADMIN/RCVD: CPT | Performed by: NURSE PRACTITIONER

## 2019-07-10 PROCEDURE — G8417 CALC BMI ABV UP PARAM F/U: HCPCS | Performed by: NURSE PRACTITIONER

## 2019-07-10 PROCEDURE — G8427 DOCREV CUR MEDS BY ELIG CLIN: HCPCS | Performed by: NURSE PRACTITIONER

## 2019-07-10 PROCEDURE — 99215 OFFICE O/P EST HI 40 MIN: CPT | Performed by: NURSE PRACTITIONER

## 2019-07-10 PROCEDURE — 93281 PM DEVICE PROGR EVAL MULTI: CPT | Performed by: NURSE PRACTITIONER

## 2019-07-10 PROCEDURE — 1090F PRES/ABSN URINE INCON ASSESS: CPT | Performed by: NURSE PRACTITIONER

## 2019-07-10 PROCEDURE — 1123F ACP DISCUSS/DSCN MKR DOCD: CPT | Performed by: NURSE PRACTITIONER

## 2019-07-10 PROCEDURE — 1111F DSCHRG MED/CURRENT MED MERGE: CPT | Performed by: NURSE PRACTITIONER

## 2019-07-10 ASSESSMENT — ENCOUNTER SYMPTOMS
CHEST TIGHTNESS: 0
SHORTNESS OF BREATH: 0

## 2019-07-10 NOTE — PROGRESS NOTES
management of these Electrophysiology conditions: persistent AF, s/p Sotalol initiation, s/p CV, AVB, CRT-P in situ. She followed with Dr Luis Hampton on 5/22/19 due to previous complaints of nausea and decreased appetite her digoxin was stopped with improved symptoms. She was admitted 6/11/19 for Sotalol initiation. She had been on amiodarone which was discontinued secondary to elevated LFTs and a CT scan showing mild restrictive lung disease with decrease lung diffusion and mild fibrotic change. She follows with Pulmonary. Based on her CrCl and QT prolongation, Sotalol dosing was decreased to QOD. She underwent successful CV on 6/13/19. On  6/18/19 her daughter called Dr Jes Minor office feeling her mother's medications needed adjusted; she reported she passed out; Aldactone and Cardizem were discontinued. She presented to the office for one week ECG 6/20/19 maintaining SR. She complained of dizziness, disorientation and inability to focus since CRT-P battery change 3/11/19. On 7/3/19 a [facility] alert showed AF since 7/2/19 with HRs  bpm. A repeat transmission showed she had converted back to SR. Dr Shira Hull was notified. He did not feel symptoms were related to AF since she did not feel well in SR and recommended resuming Cardizem 30 mg TID which was forwarded to Dr Monty Medina. She presents today for office follow-up. She states she still has periods of dizziness. In review of her device transmission, she has had episodes of SVT. In discussion with her, she \"may have missed a dose\" of Sotalol since she felt dizzy. Cardizem has not been started. We discussed, at length today, the purpose of AAD therapy as well as the importance of taking the medication as directed. We also discussed the reason for adding Cardizem. She has \"never\" missed any doses of Eliquis and denies any bleeding issues. Her BP is stable and she is Bi-V paced 97%. She denies any HF symptoms. She is enrolled in [place] remote monitoring.  She (2.1-3.2cm) Left Atrium: 4.5 (2.5-4.0cm)  IVSd: 1.3 (0.7-1.1cm) Aortic Root: 3.2 (2.2-3.7cm)  LVDd: 4.9 (4.0-5.6cm) Aortic Cusp Exc.: 1.4 (1.5-2.0cm)  PWd: 1.2 (0.7-1.1cm) MV EPSS: 1.6 (<0.5cm)  FS(%): 28.3 %  LVDs: 3.5 (2.0-3.8cm) ESV (Teich): 50.9 ml  LVEF(%): 44.4 (>50%)     PAGE 1 Signed Report (CONTINUED)  Name: Maximpili Piero  Phys: Zen Diane  : 1935 Age: 81 Sex: F  Acct: [de-identified] Loc: CARD   Exam Date: 02/15/2019 Status: REG CLI  Radiology No: [id number]  Unit No: [id number]             EXAM# TYPE/EXAM RESULT   [id number] ECHO/ECHOCARDIOGRAM COMPLETE SEE REPORT   <Continued>      Volumes  Left Atrial Volume (Systole)   Single Plane 4CH: 69.0 mL Single Plane 2CH: 65.0 mL  Biplane LA Volume: 67.0 mL LA ESV Index: 36.0 mL/m2    Aortic Valve  AO Mean Gr.: 2.0 mmHg LV Mean P.0 mmHg  AO V2 Mean: 0.6 m/s   AO V2 VTI: 16.4 cm LV Mean: 0.4 m/s  BALJEET (VTI): 3.0 cm2 LV V1 VTI: 11.9 cm    Mitral Valve  MV PHT: 54.0 ms  MV Mean Gr.: 3.0 mmHg MVA (PHT): 4.1 cm2  MV E Max Albert.: 1.3 m/s   MV Decel. Time: 261.0 ms  MV VTI: 33.8 cm     TDI  E/Lateral E': 13.0 E/Medial E': 13.0  E': 59.5 Medial E' Albert.: 0.1 m/s  Lateral E' Albert.: 0.1 m/s    Pulmonary Valve  PV Peak Albert.: 0.6 m/s PV Peak Gr.: 2.0 mmHg    Tricuspid Valve  TR Peak Gr.: 29.0 mmHg     Left Ventricle  The left ventricle is normal size. There is global hypokinesis of the   left ventricle. Mild concentric left ventricular hypertrophy. LVEF is   45%. Grade II - pseudonormal filling dynamics. Right Ventricle  Right ventricle is moderately dilated. The right ventricle is normal   size.  The right ventricular systolic function is normal. Device lead     PAGE 2 Signed Report (CONTINUED)  Name: Williamnereidapili Jeanlouis  Phys: Zen Diane  : 1935 Age: 81 Sex: F  Acct: [de-identified] Loc: CARD   Exam Date: 02/15/2019 Status: REG CLI  Radiology No: [id number]  Unit No: [id number]             EXAM# TYPE/EXAM RESULT   [id number] ECHO/ECHOCARDIOGRAM COMPLETE SEE REPORT   <Continued>    is present in the right ventricle. Atria  Left atrium is mildly dilated. Interatrial septum not well   visualized. Right atrium size is normal.    Aortic Valve  The Aortic valve is sclerotic. Mitral Valve  There is mitral annular calcification. Mild to moderate mitral   regurgitation. Tricuspid Valve  The tricuspid valve is normal in structure. Moderate tricuspid   regurgitation, RVSP 34mmHg    Pulmonic Valve  Pulmonic valve is not well visualized. Mild pulmonic   regurgitation. Great Vessels  Aortic root is normal in size. IVC is not well   visualized. Pericardium  Mild anterior pericardial effusion. Critical Notification  Critical Value: No    <Conclusion>  The left ventricle is normal size. There is global hypokinesis of the left ventricle, LVEF is 45 +/-   3%  Mild concentric left ventricular hypertrophy. Grade II - pseudonormal filling dynamics. Left atrium is mildly dilated. PAGE 3 Signed Report (CONTINUED)  Name: Lazarus Emmer  Phys: Isis Postal  : 1935 Age: 81 Sex: F  Acct: [de-identified] Loc: CARD   Exam Date: 02/15/2019 Status: REG CLI  Radiology No: [id number]  Unit No: [id number]             EXAM# TYPE/EXAM RESULT   [id number] ECHO/ECHOCARDIOGRAM COMPLETE SEE REPORT   <Continued>    There is mitral annular calcification. Mild to moderate mitral regurgitation. Moderate tricuspid regurgitation, RVSP 34mmHG. .  Mild pulmonic regurgitation. Aortic root is normal in size. Small pericardial effusion. No intracardiac mass. Prior echo in 2017. TDS unable to tolerate laying flat due to vertigo. Echocardiogram 17:     Cardiac Catheterization 17:   PROCEDURE: Left heart catheterization, coronary angiography, PCI of RCA. TECHNIQUE: Right radial access was obtained with the aid of ultrasound   guidance. A glide sheath slender 6 was placed with no difficulty.  Coronary   angiography was performed with a TIG catheter for the right delivery of the appropriate therapy and to verify function of the device.      ECG 7/10/2019: Sinus rhythm, HR 76 bpm, AP-BiVP, QTc 426 ms    I have independently reviewed all of the ECGs and rhythm strips per above    I have personally reviewed the laboratory, cardiac diagnostic and radiographic testing as outlined above:         Impression:    1. Persistent atrial fibrillation  - Questionable symptomatic.  - [id number] of 5.  - Rate control: Metoprolol and Cardizem. - Developed side effect with Digoxin and Amiodarone in the past.  - Eliquis for stroke risk reduction. - Symptomatic: admission for Sotalol AAD initiation 6/10/19 based on CrCl 28 mL/min  - s/p CV 6/13/19  - SR, transient SVT/AT  - resume Cardizem (30 mg TID)     2.  CRT-P in situ  - DOI: 5/4/12.  - [employer] dual chamber.  - S/p CRT-P upgrade 3/11/19.  - Proper device function.  - AF burden 100%. - SR, Bi- 97%     3. Coronary artery disease  - S/p PCI of RCA on 6/26/17.     4. Hypertension  - On Cardizem and Metoprolol.  - Cardizem d/c'd 6/19/19  - Cardizem 30 mg TID resumed 7/10/19     5. Hyperlipidemia  - On Pravachol.     6. Hypothyroidism  - On Synthroid.     7. CKD  - CrCl 28 mL/min  - sotalol QOD dosing.     8. Dyspnea on exertion  - Unclear etiology. - History of Amiodarone usage.  - previous pulmonary evaluation ?etiology. - stress test: see above      Summary:    Ms Huntley presents for one month follow-up post-Sotalol initiation and CV. Her Sotalol dosing is QOD based on CrCl. Cardizem and Aldactone were discontinued 6/19/19 through Dr Panda Fuel office. Last week she developed recurrent PAF, with periods of RVR, lasting ~15 hours noted on remote device transmission with recommendations per Dr Anabell Burrows. She has had noted PSVT as well. Her overall AF burden is <1%. She admits to likely missing \"a dose of Sotalol\" since she felt dizzy. She has missed no Eliquis doses.     1. We discussed, at length the importance of taking Sotalol, as well

## 2019-07-11 ENCOUNTER — CARE COORDINATION (OUTPATIENT)
Dept: CARE COORDINATION | Age: 84
End: 2019-07-11

## 2019-07-20 DIAGNOSIS — I48.19 PERSISTENT ATRIAL FIBRILLATION (HCC): Chronic | ICD-10-CM

## 2019-07-22 RX ORDER — PRAVASTATIN SODIUM 40 MG
TABLET ORAL
Qty: 90 TABLET | Refills: 2 | Status: SHIPPED | OUTPATIENT
Start: 2019-07-22 | End: 2019-08-07 | Stop reason: SDUPTHER

## 2019-08-02 ENCOUNTER — CARE COORDINATION (OUTPATIENT)
Dept: CASE MANAGEMENT | Age: 84
End: 2019-08-02

## 2019-08-05 RX ORDER — CLOPIDOGREL BISULFATE 75 MG/1
1 TABLET ORAL DAILY
COMMUNITY
End: 2019-08-07

## 2019-08-07 ENCOUNTER — OFFICE VISIT (OUTPATIENT)
Dept: CARDIOLOGY CLINIC | Age: 84
End: 2019-08-07
Payer: MEDICARE

## 2019-08-07 VITALS
DIASTOLIC BLOOD PRESSURE: 88 MMHG | HEART RATE: 76 BPM | HEIGHT: 67 IN | SYSTOLIC BLOOD PRESSURE: 134 MMHG | WEIGHT: 169 LBS | RESPIRATION RATE: 18 BRPM | BODY MASS INDEX: 26.53 KG/M2

## 2019-08-07 DIAGNOSIS — I10 ESSENTIAL HYPERTENSION: Primary | Chronic | ICD-10-CM

## 2019-08-07 DIAGNOSIS — I48.19 PERSISTENT ATRIAL FIBRILLATION (HCC): Chronic | ICD-10-CM

## 2019-08-07 PROCEDURE — 4040F PNEUMOC VAC/ADMIN/RCVD: CPT | Performed by: INTERNAL MEDICINE

## 2019-08-07 PROCEDURE — 99214 OFFICE O/P EST MOD 30 MIN: CPT | Performed by: INTERNAL MEDICINE

## 2019-08-07 PROCEDURE — 1123F ACP DISCUSS/DSCN MKR DOCD: CPT | Performed by: INTERNAL MEDICINE

## 2019-08-07 PROCEDURE — G8427 DOCREV CUR MEDS BY ELIG CLIN: HCPCS | Performed by: INTERNAL MEDICINE

## 2019-08-07 PROCEDURE — G8400 PT W/DXA NO RESULTS DOC: HCPCS | Performed by: INTERNAL MEDICINE

## 2019-08-07 PROCEDURE — 93000 ELECTROCARDIOGRAM COMPLETE: CPT | Performed by: INTERNAL MEDICINE

## 2019-08-07 PROCEDURE — G8417 CALC BMI ABV UP PARAM F/U: HCPCS | Performed by: INTERNAL MEDICINE

## 2019-08-07 PROCEDURE — 1036F TOBACCO NON-USER: CPT | Performed by: INTERNAL MEDICINE

## 2019-08-07 PROCEDURE — 1090F PRES/ABSN URINE INCON ASSESS: CPT | Performed by: INTERNAL MEDICINE

## 2019-08-07 RX ORDER — SOTALOL HYDROCHLORIDE 120 MG/1
120 TABLET ORAL EVERY OTHER DAY
Qty: 90 TABLET | Refills: 3 | Status: SHIPPED | OUTPATIENT
Start: 2019-08-07 | End: 2019-11-05

## 2019-08-07 RX ORDER — PRAVASTATIN SODIUM 40 MG
TABLET ORAL
Qty: 90 TABLET | Refills: 3 | Status: SHIPPED
Start: 2019-08-07 | End: 2020-04-15

## 2019-08-07 RX ORDER — METOPROLOL SUCCINATE 100 MG/1
100 TABLET, EXTENDED RELEASE ORAL 2 TIMES DAILY
Qty: 180 TABLET | Refills: 3 | Status: SHIPPED
Start: 2019-08-07 | End: 2020-08-25

## 2019-08-07 NOTE — PROGRESS NOTES
well-nourished. No distress. Head: Normocephalic and atraumatic. Eyes: EOM are normal. Pupils are equal, round, and reactive to light. Neck: Normal range of motion. Neck supple. No hepatojugular reflux and no JVD present. Carotid bruit is not present. No tracheal deviation present. No thyromegaly present. Cardiovascular: Normal rate, regular rhythm, normal heart sounds and intact distal pulses. Exam reveals no gallop and no friction rub. No murmur heard. Pulmonary/Chest: Effort normal and breath sounds normal. No respiratory distress. No wheezes. No rales. No tenderness. Abdominal: Soft. Bowel sounds are normal. No distension and no mass. No tenderness. No rebound and no guarding. Musculoskeletal: Normal range of motion. No edema and no tenderness. Lymphadenopathy:   No cervical adenopathy. No groin adenopathy. Neurological: Alert and oriented to person, place, and time. Skin: Skin is warm and dry. No rash noted. Not diaphoretic. No erythema. Psychiatric: Normal mood and affect. Behavior is normal.     EKG:  A-V paced. Cath Findings 6/26/17:   Left main: 0% stenosis  LAD: mild, calcific disease  Circumflex: mild disease   RCA: Dominant. 80 % mid stenosis. Hemodynamics: Ao: 115/63  Interventional procedure:   1. PCI vessel: RCA. Lesion type: B. JIM III flow pre PCI. Angioplasty performed with 2.5 balloon. Stenting performed with Alpine DANE 2.75 X 18 . Result: 0% residual stenosis and JIM III distal flow. ASSESSMENT AND PLAN:  Patient Active Problem List   Diagnosis    Chest pain    Persistent atrial fibrillation ([address]    Essential hypertension    HLD (hyperlipidemia)    Hypothyroidism    Acute on chronic diastolic heart failure ([address]    Pure hypercholesterolemia    Hyperthyroidism    Paced rhythm on electrocardiogram (ECG)    Pacemaker    Biventricular cardiac pacemaker in situ    CKD (chronic kidney disease)     1. CAD: Asymptomatic. Continue ASA/BB/statin.

## 2019-08-13 NOTE — PROGRESS NOTES
care.     I have spent a total of 40 minutes with the patient and his/her family reviewing the above stated recommendations.  And a total of >50% of that time involved face-to-face time providing counseling and or coordination of care with the other providers    Carolynn Daly MD  Cardiac Electrophysiology  [facility]  [facility] [place]  8/15/19   11:44 AM

## 2019-08-15 ENCOUNTER — OFFICE VISIT (OUTPATIENT)
Dept: NON INVASIVE DIAGNOSTICS | Age: 84
End: 2019-08-15
Payer: MEDICARE

## 2019-08-15 VITALS
SYSTOLIC BLOOD PRESSURE: 104 MMHG | WEIGHT: 170 LBS | DIASTOLIC BLOOD PRESSURE: 64 MMHG | HEART RATE: 75 BPM | BODY MASS INDEX: 26.68 KG/M2 | RESPIRATION RATE: 22 BRPM | HEIGHT: 67 IN

## 2019-08-15 DIAGNOSIS — Z95.0 BIVENTRICULAR CARDIAC PACEMAKER IN SITU: Primary | ICD-10-CM

## 2019-08-15 PROCEDURE — G8417 CALC BMI ABV UP PARAM F/U: HCPCS | Performed by: INTERNAL MEDICINE

## 2019-08-15 PROCEDURE — 93281 PM DEVICE PROGR EVAL MULTI: CPT | Performed by: INTERNAL MEDICINE

## 2019-08-15 PROCEDURE — G8400 PT W/DXA NO RESULTS DOC: HCPCS | Performed by: INTERNAL MEDICINE

## 2019-08-15 PROCEDURE — 1123F ACP DISCUSS/DSCN MKR DOCD: CPT | Performed by: INTERNAL MEDICINE

## 2019-08-15 PROCEDURE — G8427 DOCREV CUR MEDS BY ELIG CLIN: HCPCS | Performed by: INTERNAL MEDICINE

## 2019-08-15 PROCEDURE — 1090F PRES/ABSN URINE INCON ASSESS: CPT | Performed by: INTERNAL MEDICINE

## 2019-08-15 PROCEDURE — 1036F TOBACCO NON-USER: CPT | Performed by: INTERNAL MEDICINE

## 2019-08-15 PROCEDURE — 99215 OFFICE O/P EST HI 40 MIN: CPT | Performed by: INTERNAL MEDICINE

## 2019-08-15 PROCEDURE — 4040F PNEUMOC VAC/ADMIN/RCVD: CPT | Performed by: INTERNAL MEDICINE

## 2019-08-23 ENCOUNTER — CARE COORDINATION (OUTPATIENT)
Dept: CASE MANAGEMENT | Age: 84
End: 2019-08-23

## 2019-09-10 ENCOUNTER — TELEPHONE (OUTPATIENT)
Dept: NON INVASIVE DIAGNOSTICS | Age: 84
End: 2019-09-10

## 2019-09-10 ENCOUNTER — CARE COORDINATION (OUTPATIENT)
Dept: CASE MANAGEMENT | Age: 84
End: 2019-09-10

## 2019-09-10 DIAGNOSIS — Z95.0 BIVENTRICULAR CARDIAC PACEMAKER IN SITU: Primary | ICD-10-CM

## 2019-09-10 PROCEDURE — 1111F DSCHRG MED/CURRENT MED MERGE: CPT | Performed by: INTERNAL MEDICINE

## 2019-09-11 ENCOUNTER — CARE COORDINATION (OUTPATIENT)
Dept: CASE MANAGEMENT | Age: 84
End: 2019-09-11

## 2019-10-10 ENCOUNTER — NURSE ONLY (OUTPATIENT)
Dept: NON INVASIVE DIAGNOSTICS | Age: 84
End: 2019-10-10
Payer: MEDICARE

## 2019-10-10 DIAGNOSIS — Z95.0 BIVENTRICULAR CARDIAC PACEMAKER IN SITU: Primary | ICD-10-CM

## 2019-10-10 DIAGNOSIS — I48.91 ATRIAL FIBRILLATION STATUS POST CARDIOVERSION (HCC): ICD-10-CM

## 2019-10-10 PROCEDURE — 93296 REM INTERROG EVL PM/IDS: CPT | Performed by: INTERNAL MEDICINE

## 2019-10-10 PROCEDURE — 93294 REM INTERROG EVL PM/LDLS PM: CPT | Performed by: INTERNAL MEDICINE

## 2019-10-17 RX ORDER — APIXABAN 2.5 MG/1
TABLET, FILM COATED ORAL
Qty: 180 TABLET | Refills: 0 | Status: SHIPPED | OUTPATIENT
Start: 2019-10-17 | End: 2020-01-17

## 2019-11-05 ENCOUNTER — TELEPHONE (OUTPATIENT)
Dept: NON INVASIVE DIAGNOSTICS | Age: 84
End: 2019-11-05

## 2020-01-09 ENCOUNTER — NURSE ONLY (OUTPATIENT)
Dept: NON INVASIVE DIAGNOSTICS | Age: 85
End: 2020-01-09
Payer: MEDICARE

## 2020-01-09 PROCEDURE — 93294 REM INTERROG EVL PM/LDLS PM: CPT | Performed by: INTERNAL MEDICINE

## 2020-01-09 PROCEDURE — 93296 REM INTERROG EVL PM/IDS: CPT | Performed by: INTERNAL MEDICINE

## 2020-01-09 NOTE — PROGRESS NOTES
See [facility] [place] report. Remote monitoring reviewed over a 90 day period.   End of 90 day monitoring period date of service 01/09/2020

## 2020-01-17 RX ORDER — APIXABAN 2.5 MG/1
TABLET, FILM COATED ORAL
Qty: 180 TABLET | Refills: 3 | Status: SHIPPED
Start: 2020-01-17 | End: 2021-01-11

## 2020-01-23 ENCOUNTER — OFFICE VISIT (OUTPATIENT)
Dept: NON INVASIVE DIAGNOSTICS | Age: 85
End: 2020-01-23
Payer: MEDICARE

## 2020-01-23 VITALS
HEIGHT: 67 IN | BODY MASS INDEX: 27.94 KG/M2 | WEIGHT: 178 LBS | HEART RATE: 82 BPM | RESPIRATION RATE: 16 BRPM | DIASTOLIC BLOOD PRESSURE: 70 MMHG | SYSTOLIC BLOOD PRESSURE: 120 MMHG

## 2020-01-23 PROCEDURE — G8417 CALC BMI ABV UP PARAM F/U: HCPCS | Performed by: NURSE PRACTITIONER

## 2020-01-23 PROCEDURE — 1123F ACP DISCUSS/DSCN MKR DOCD: CPT | Performed by: NURSE PRACTITIONER

## 2020-01-23 PROCEDURE — 4040F PNEUMOC VAC/ADMIN/RCVD: CPT | Performed by: NURSE PRACTITIONER

## 2020-01-23 PROCEDURE — 93281 PM DEVICE PROGR EVAL MULTI: CPT | Performed by: NURSE PRACTITIONER

## 2020-01-23 PROCEDURE — 93290 INTERROG DEV EVAL ICPMS IP: CPT | Performed by: NURSE PRACTITIONER

## 2020-01-23 PROCEDURE — G8400 PT W/DXA NO RESULTS DOC: HCPCS | Performed by: NURSE PRACTITIONER

## 2020-01-23 PROCEDURE — 1036F TOBACCO NON-USER: CPT | Performed by: NURSE PRACTITIONER

## 2020-01-23 PROCEDURE — G8484 FLU IMMUNIZE NO ADMIN: HCPCS | Performed by: NURSE PRACTITIONER

## 2020-01-23 PROCEDURE — 1090F PRES/ABSN URINE INCON ASSESS: CPT | Performed by: NURSE PRACTITIONER

## 2020-01-23 PROCEDURE — 99214 OFFICE O/P EST MOD 30 MIN: CPT | Performed by: NURSE PRACTITIONER

## 2020-01-23 PROCEDURE — G8427 DOCREV CUR MEDS BY ELIG CLIN: HCPCS | Performed by: NURSE PRACTITIONER

## 2020-01-28 LAB
LEFT VENTRICULAR EJECTION FRACTION HIGH VALUE: 58 %
LEFT VENTRICULAR EJECTION FRACTION MODE: NORMAL
LV EF: 58 %

## 2020-01-30 LAB
MAGNESIUM: NORMAL
T3 FREE: 2.3
T4 FREE: 1.12

## 2020-02-03 ENCOUNTER — TELEPHONE (OUTPATIENT)
Dept: NON INVASIVE DIAGNOSTICS | Age: 85
End: 2020-02-03

## 2020-02-03 NOTE — TELEPHONE ENCOUNTER
----- Message from THEO Potts CNP sent at 1/31/2020  4:28 PM EST -----  Please let Jasen Mancia or her daughter known that her EF is normal and that no further intervention need to be taken related to her NSVT, thanks

## 2020-03-02 PROBLEM — M48.061 SPINAL STENOSIS OF LUMBAR REGION: Status: ACTIVE | Noted: 2020-03-02

## 2020-03-02 RX ORDER — MECLIZINE HCL 12.5 MG/1
1 TABLET ORAL 3 TIMES DAILY PRN
COMMUNITY
Start: 2019-12-19 | End: 2020-03-03

## 2020-03-03 ENCOUNTER — OFFICE VISIT (OUTPATIENT)
Dept: CARDIOLOGY CLINIC | Age: 85
End: 2020-03-03
Payer: MEDICARE

## 2020-03-03 VITALS
BODY MASS INDEX: 27.94 KG/M2 | WEIGHT: 178 LBS | DIASTOLIC BLOOD PRESSURE: 78 MMHG | SYSTOLIC BLOOD PRESSURE: 130 MMHG | HEIGHT: 67 IN | RESPIRATION RATE: 20 BRPM | HEART RATE: 76 BPM

## 2020-03-03 PROCEDURE — G8400 PT W/DXA NO RESULTS DOC: HCPCS | Performed by: INTERNAL MEDICINE

## 2020-03-03 PROCEDURE — 4040F PNEUMOC VAC/ADMIN/RCVD: CPT | Performed by: INTERNAL MEDICINE

## 2020-03-03 PROCEDURE — G8427 DOCREV CUR MEDS BY ELIG CLIN: HCPCS | Performed by: INTERNAL MEDICINE

## 2020-03-03 PROCEDURE — G8482 FLU IMMUNIZE ORDER/ADMIN: HCPCS | Performed by: INTERNAL MEDICINE

## 2020-03-03 PROCEDURE — 99214 OFFICE O/P EST MOD 30 MIN: CPT | Performed by: INTERNAL MEDICINE

## 2020-03-03 PROCEDURE — 1036F TOBACCO NON-USER: CPT | Performed by: INTERNAL MEDICINE

## 2020-03-03 PROCEDURE — G8417 CALC BMI ABV UP PARAM F/U: HCPCS | Performed by: INTERNAL MEDICINE

## 2020-03-03 PROCEDURE — 1090F PRES/ABSN URINE INCON ASSESS: CPT | Performed by: INTERNAL MEDICINE

## 2020-03-03 PROCEDURE — 93000 ELECTROCARDIOGRAM COMPLETE: CPT | Performed by: INTERNAL MEDICINE

## 2020-03-03 PROCEDURE — 1123F ACP DISCUSS/DSCN MKR DOCD: CPT | Performed by: INTERNAL MEDICINE

## 2020-03-03 RX ORDER — FUROSEMIDE 20 MG/1
40 TABLET ORAL DAILY
Qty: 180 TABLET | Refills: 3 | Status: SHIPPED
Start: 2020-03-03 | End: 2021-03-01

## 2020-03-03 NOTE — PROGRESS NOTES
CHIEF COMPLAINT: CAD/Afib    HISTORY OF PRESENT ILLNESS: Patient is a 81 y.o. female seen at the request of Chang Ryan MD.      PCI to RCA 6/26/17. No CP. Some SOB. Nausea and poor appetite improved after stopping digoxin. Past Medical History:    1. HTN  2. HLD, on statin therapy  3. Hypothyroidism, on statin therapy. 4. Echo: 6/3/2017:  Left ventricular internal dimensions were normal in diastole and systole, borderline LVH, Normal left ventricular ejection fraction, The left atrium is severely dilated, Moderately enlarged right atrium size, Focal calcification mitral valve leaflets, Moderate mitral annular calcification, Moderate mitral regurgitation is present, Moderate tricuspid regurgitation, Pulmonary hypertension is mild . 5. Lexiscan Stress Test: 6/3/2017: EF 77%; No evidence of stress-induced left ventricular myocardial ischemia. 6. History of cholecystectomy, right knee replacement, s/p lumbar lameinectomy  7. Mild Bilateral Carotid Disease: VL Carotid Brent: 6/24/2014:                         A.  Right internal carotid artery: 0-40%. B. Left internal carotid artery 0-40%. 8. Persistent Afib: [id number] score 4 (HTN, Age, Female). On Eliquis 2.5 mg BID. A. Evaluated by Dr. Charlyne Barthel: 6/7/2017 resolution of her hyperthyroidism. Once this is occurred, reassessment of a long-term strategy either that of rate control  which if unachievable with medical therapy may require an atrioventricular junctional ablation or that of a rhythm control strategy if persistent symptoms of exertional intolerance or dyspnea are noted which will likely require an alternative antiarrhythmic agent based on toxicity with previous utilized amiodarone. 9. History of Complete Heart Block s/p dual chamber pacemaker ([employer]). Last reported interrogation was on 6/3/2017 (reports requested).      Past Medical History:   Diagnosis Date

## 2020-04-09 ENCOUNTER — NURSE ONLY (OUTPATIENT)
Dept: NON INVASIVE DIAGNOSTICS | Age: 85
End: 2020-04-09
Payer: MEDICARE

## 2020-04-09 PROCEDURE — 93296 REM INTERROG EVL PM/IDS: CPT | Performed by: INTERNAL MEDICINE

## 2020-04-09 PROCEDURE — 93294 REM INTERROG EVL PM/LDLS PM: CPT | Performed by: INTERNAL MEDICINE

## 2020-04-09 NOTE — PROGRESS NOTES
See [employer] [place] report. Remote monitoring reviewed over a 90 day period.   End of 90 day monitoring period date of service 04/09/2020

## 2020-04-15 RX ORDER — PRAVASTATIN SODIUM 40 MG
TABLET ORAL
Qty: 90 TABLET | Refills: 3 | Status: SHIPPED
Start: 2020-04-15 | End: 2021-04-12

## 2020-06-04 ENCOUNTER — OFFICE VISIT (OUTPATIENT)
Dept: CARDIOLOGY CLINIC | Age: 85
End: 2020-06-04
Payer: MEDICARE

## 2020-06-04 VITALS
DIASTOLIC BLOOD PRESSURE: 72 MMHG | SYSTOLIC BLOOD PRESSURE: 130 MMHG | RESPIRATION RATE: 22 BRPM | WEIGHT: 183 LBS | HEART RATE: 79 BPM | HEIGHT: 67 IN | BODY MASS INDEX: 28.72 KG/M2

## 2020-06-04 PROCEDURE — G8417 CALC BMI ABV UP PARAM F/U: HCPCS | Performed by: INTERNAL MEDICINE

## 2020-06-04 PROCEDURE — 93000 ELECTROCARDIOGRAM COMPLETE: CPT | Performed by: INTERNAL MEDICINE

## 2020-06-04 PROCEDURE — 1090F PRES/ABSN URINE INCON ASSESS: CPT | Performed by: INTERNAL MEDICINE

## 2020-06-04 PROCEDURE — G8400 PT W/DXA NO RESULTS DOC: HCPCS | Performed by: INTERNAL MEDICINE

## 2020-06-04 PROCEDURE — 99214 OFFICE O/P EST MOD 30 MIN: CPT | Performed by: INTERNAL MEDICINE

## 2020-06-04 PROCEDURE — 4040F PNEUMOC VAC/ADMIN/RCVD: CPT | Performed by: INTERNAL MEDICINE

## 2020-06-04 PROCEDURE — 1123F ACP DISCUSS/DSCN MKR DOCD: CPT | Performed by: INTERNAL MEDICINE

## 2020-06-04 PROCEDURE — 1036F TOBACCO NON-USER: CPT | Performed by: INTERNAL MEDICINE

## 2020-06-04 PROCEDURE — G8427 DOCREV CUR MEDS BY ELIG CLIN: HCPCS | Performed by: INTERNAL MEDICINE

## 2020-06-04 NOTE — PROGRESS NOTES
Diagnosis Date    Atrial fibrillation (HCC)     Heart palpitations     History of kidney problems     Hyperlipidemia     Hypertension     Hypothyroidism     Vertigo        Patient Active Problem List   Diagnosis    Chest pain    Persistent atrial fibrillation    Essential hypertension    HLD (hyperlipidemia)    Hypothyroidism    Acute on chronic diastolic heart failure (HCC)    Pure hypercholesterolemia    Hyperthyroidism    Paced rhythm on electrocardiogram (ECG)    Pacemaker    Biventricular cardiac pacemaker in situ    CKD (chronic kidney disease)    Spinal stenosis of lumbar region       Allergies   Allergen Reactions    Amiodarone Other (See Comments)     Causes issues with liver enzymes. Current Outpatient Medications   Medication Sig Dispense Refill    pravastatin (PRAVACHOL) 40 MG tablet TAKE ONE TABLET BY MOUTH DAILY 90 tablet 3    furosemide (LASIX) 20 MG tablet Take 2 tablets by mouth daily 180 tablet 3    ELIQUIS 2.5 MG TABS tablet TAKE ONE TABLET BY MOUTH TWO TIMES A  tablet 3    metoprolol succinate (TOPROL XL) 100 MG extended release tablet Take 1 tablet by mouth 2 times daily 180 tablet 3    levothyroxine (SYNTHROID) 100 MCG tablet TAKE 1 TABLET BY MOUTH ON AN EMPTY STOMACH IN THE MORNING  1    LORazepam (ATIVAN) 0.5 MG tablet TAKE ONE TABLET BY MOUTH DAILY AS NEEDED  3    Cholecalciferol (VITAMIN D PO) Take 4,000 Units by mouth daily        No current facility-administered medications for this visit. Social History     Socioeconomic History    Marital status:       Spouse name: Not on file    Number of children: Not on file    Years of education: Not on file    Highest education level: Not on file   Occupational History    Not on file   Social Needs    Financial resource strain: Not on file    Food insecurity     Worry: Not on file     Inability: Not on file    Transportation needs     Medical: Not on file     Non-medical: Not on file sounds and intact distal pulses. Exam reveals no gallop and no friction rub. No murmur heard. Pulmonary/Chest: Effort normal and breath sounds normal. No respiratory distress. No wheezes. No rales. No tenderness. Abdominal: Soft. Bowel sounds are normal. No distension and no mass. No tenderness. No rebound and no guarding. Musculoskeletal: Normal range of motion. No edema and no tenderness. Lymphadenopathy:   No cervical adenopathy. No groin adenopathy. Neurological: Alert and oriented to person, place, and time. Skin: Skin is warm and dry. No rash noted. Not diaphoretic. No erythema. Psychiatric: Normal mood and affect. Behavior is normal.     EKG:  A-V paced. Cath Findings 6/26/17:   Left main: 0% stenosis  LAD: mild, calcific disease  Circumflex: mild disease   RCA: Dominant. 80 % mid stenosis. Hemodynamics: Ao: 115/63  Interventional procedure:   1. PCI vessel: RCA. Lesion type: B. JIM III flow pre PCI. Angioplasty performed with 2.5 balloon. Stenting performed with Alpine DANE 2.75 X 18 . Result: 0% residual stenosis and JIM III distal flow. ASSESSMENT AND PLAN:  Patient Active Problem List   Diagnosis    Chest pain    Persistent atrial fibrillation    Essential hypertension    HLD (hyperlipidemia)    Hypothyroidism    Acute on chronic diastolic heart failure ([address]    Pure hypercholesterolemia    Hyperthyroidism    Paced rhythm on electrocardiogram (ECG)    Pacemaker    Biventricular cardiac pacemaker in situ    CKD (chronic kidney disease)    Spinal stenosis of lumbar region     1. GARG: Worsening. Stable echo 1/28/2020. Reduce BB. Stress. States symptoms worse since last pacer visit when changes were made, arrange EP visit. 2. CAD: Asymptomatic. Continue ASA/BB/statin. 3. Afib: In sinus. On Eliquis. (avoid digoxin in future as she had anorexia with it in past)    4. Pacer: Per EP. Iram Butler D.O.   Cardiologist  Cardiology, [facility]

## 2020-06-05 ENCOUNTER — TELEPHONE (OUTPATIENT)
Dept: NON INVASIVE DIAGNOSTICS | Age: 85
End: 2020-06-05

## 2020-06-05 NOTE — TELEPHONE ENCOUNTER
----- Message from Apoorva Cardoso sent at 6/4/2020  4:10 PM EDT -----    ----- Message -----  From: Tiffany Hunt MD  Sent: 6/4/2020   2:45 PM EDT  To: Apoorva Cardoso    Please ask her to come in for device check. Thanks.  ----- Message -----  From: Apoorva Cardoso  Sent: 6/4/2020   2:18 PM EDT  To: Tiffany Hunt MD    Please advise  ----- Message -----  From: Ozzie Sauceda DO  Sent: 6/4/2020   1:23 PM EDT  To: Apoorva Cardoso    Can u arrange an appt with an EP and pacer check in person? This lady states severe GARG since her last in person check when she states the adjusted her device. TYVM!     Harmeet Crowe

## 2020-06-05 NOTE — TELEPHONE ENCOUNTER
Spoke with Aaron Baxter. Offered to bring her into device clinic on either June 8, 2020 or June 11,2020. Patient will call me back to see when her daughter can bring her.      Aron Hearn

## 2020-06-08 ENCOUNTER — NURSE ONLY (OUTPATIENT)
Dept: NON INVASIVE DIAGNOSTICS | Age: 85
End: 2020-06-08
Payer: MEDICARE

## 2020-06-08 PROCEDURE — 93281 PM DEVICE PROGR EVAL MULTI: CPT | Performed by: INTERNAL MEDICINE

## 2020-06-12 LAB
LV EF: 79 %
LVEF MODALITY: NORMAL

## 2020-08-25 RX ORDER — METOPROLOL SUCCINATE 100 MG/1
TABLET, EXTENDED RELEASE ORAL
Qty: 180 TABLET | Refills: 0 | Status: SHIPPED
Start: 2020-08-25 | End: 2020-09-01 | Stop reason: SDUPTHER

## 2020-08-31 NOTE — PROGRESS NOTES
Cardiac Electrophysiology Outpatient Progress Note    Cristian Blas  1935  Date of Service: 9/1/2020  Referring Provider/PCP: No primary care provider on file. Cardiologist: Bailey Gilliland DO  Electrophysiologist: Benjamin Duarte MD    SUBJECTIVE: Kym Huntley presents to the office today for the management of these Electrophysiology conditions: persistent AF and CRT-P in situ. The patient currently feels well but continues to complain of SOB and dizziness. She offers no complaints from a device POV. The device site looks well healed and free from infection or erosion. The patient denies any chest pain, dyspnea, palpitations, dizziness, syncope, orthopnea or paroxysmal nocturnal dyspnea. The patient continues to be followed remotely. Device interrogation showed 20 episodes of PSVTs.     Patient Active Problem List    Diagnosis Date Noted    Spinal stenosis of lumbar region 03/02/2020    CKD (chronic kidney disease) 03/12/2019    Biventricular cardiac pacemaker in situ     Pacemaker 03/11/2019    Paced rhythm on electrocardiogram (ECG)     Pure hypercholesterolemia     Hyperthyroidism     Chest pain 06/03/2017    Persistent atrial fibrillation 06/03/2017    Essential hypertension 06/03/2017    HLD (hyperlipidemia) 06/03/2017    Hypothyroidism 06/03/2017    Acute on chronic diastolic heart failure (HCC)        Current Outpatient Medications   Medication Sig Dispense Refill    metoprolol succinate (TOPROL XL) 50 MG extended release tablet Take 50 mg by mouth 2 times daily      pravastatin (PRAVACHOL) 40 MG tablet TAKE ONE TABLET BY MOUTH DAILY 90 tablet 3    furosemide (LASIX) 20 MG tablet Take 2 tablets by mouth daily 180 tablet 3    ELIQUIS 2.5 MG TABS tablet TAKE ONE TABLET BY MOUTH TWO TIMES A  tablet 3    levothyroxine (SYNTHROID) 100 MCG tablet TAKE 1 TABLET BY MOUTH ON AN EMPTY STOMACH IN THE MORNING  1    LORazepam (ATIVAN) 0.5 MG tablet TAKE ONE TABLET BY MOUTH DAILY AS NEEDED  3    Cholecalciferol (VITAMIN D PO) Take 4,000 Units by mouth daily        No current facility-administered medications for this visit. Allergies   Allergen Reactions    Acetaminophen      Other reaction(s): UPSET STOMACH AND NAUSEA    Amiodarone Other (See Comments)     Causes issues with liver enzymes.  Hydrocodone      Other reaction(s): UPSET STOMACH AND NAUSEA     ROS:   Constitutional: Negative for fever, activity change and appetite change. HENT: Negative for epistaxis. Eyes: Negative for diploplia, blurred vision. Respiratory: Negative for cough and chest tightness. Positive for shortness of breath and negative for wheezing. Cardiovascular: pertinent positives in HPI  Gastrointestinal: Negative for abdominal pain and blood in stool. All other review of systems are negative     PHYSICAL EXAM:  Vitals:    09/01/20 0942   BP: 126/74   Pulse: 72   Resp: 22   Temp: 98.4 °F (36.9 °C)   Weight: 186 lb (84.4 kg)   Height: 5' 7\" (1.702 m)   Constitutional: Oriented to person, place, and time. Well-developed and cooperative. Head: Normocephalic and atraumatic. Eyes: Conjunctivae are normal.     Cardiovascular: S1 normal, S2 normal and intact distal pulses. Normal rate and rhythm. PMI is not displaced. Pulmonary/Chest: Effort normal and bibasilar crackles. No respiratory distress. Abdominal: Soft. No tenderness. Musculoskeletal: Normal range of motion of all extremities, no muscle weakness. Neurological: Alert and oriented to person, place, and time. Gait - walker   Skin: Skin is warm and dry. No bruising, no ecchymosis and no rash noted. Extremity: No clubbing or cyanosis. No edema. Psychiatric: Normal mood and affect. Thought content normal.   Pacemaker site: stable,well healed, no evidence of erosion.       Pertinent Cardiac Testing:     Stress test 6/12/20     Conclusion>         The stress and resting images show normal perfusion with an         attenuation artifact.         The left ventricle is normal size.         The left ventricular systolic function is hyperdynamic.         Left ventricular wall motion is normal.         There is a medium sized area of mildly reduced uptake in the apical         segment of the anterior wall which is seen on the stress images as         well as the resting images. This area thickens and moves normally and         is most consistent with attenuation artifact.         Considering the quantitative measurements the study suggests a low         risk for adverse myocardial events.                                            ** REPORT SIGNED IN OTHER VENDOR SYSTEM 06/12/2020 **                        Reported ByAll Deluna MD      Echocardiogram 1/28/20:   Conclusion>        Mild to moderate concentric left ventricular hypertrophy.        LVEF is 55-60%.      The diastolic function is indeterminate.        There is normal LV segmental wall motion.        The right ventricular systolic function is normal.        Left atrium is severely dilated.        Right atrium is severely dilated.        There is moderate mitral annular calcification.        Mild mitral regurgitation.        Moderate tricuspid regurgitation.                                         ** REPORT SIGNED IN OTHER VENDOR SYSTEM 01/28/2020 **                       Reported By: Derrick Gunn MD     Cardiac Catheterization 6/26/17:   PROCEDURE: Left heart catheterization, coronary angiography, PCI of RCA. TECHNIQUE: Right radial access was obtained with the aid of ultrasound   guidance. A glide sheath slender 6 was placed with no difficulty. Coronary   angiography was performed with a TIG catheter for the right coronary artery   and a JL3.5 for the left coronary artery. The aortic valve was not crossed. Heparin was administered and guided by ACT determination. The patient had   received only aspirin yesterday and was given 600 mg oral Plavix in the lab.    A 6-Lao JR4 guiding catheter with side holes was used for the right   coronary artery which provided excellent backup. Floppy J-wire was advanced   to the distal portion of the vessel, and the lesion was predilated with a 2.5   x 15 mm balloon and stented with an Alpine 2.75 x 18 mm stent. There were no   complications. Indication:   1. Unstable angina   2. Catheterization: AUC score 8 . Indication 4.   3. PCI: AUC score 9 Indication 11. Procedure: Left Heart Catheterization, coronary angiography, percutaneous coronary intervention to RCA   Anesthesia: Fentanyl, benadryl   Time sedation was administered: 0841. I was present in the room when sedation was administered. Procedure end time: 4711   Time spent with face to face monitoring of moderate sedation: 45 min   Cardiac cath performed via right radial approach using Slender 6 sheath. Findings:   Left main: 0% stenosis   LAD: mild, calcific disease   Circumflex: mild disease   RCA: Dominant. 80 % mid stenosis. Hemodynamics: Ao: 115/63   Interventional procedure:   1. PCI vessel: RCA. Lesion type: B. JIM III flow pre PCI. Angioplasty performed with 2.5 balloon. Stenting performed with Alpine DANE 2.75 X 18 . Result: 0% residual stenosis and JIM III distal flow. Drugs: . Anticoagulation was maintained with heparin . 600 mg Plavix load given in cath lab. Right radial sheath: pulled, TR band applied. There was good distal pulses in the RUE at the end of the procedure. Complication: None   Blood loss: 5 cc   Contrast used: 91cc   Post op diagnosis:   Unstable Angina   PCI of RCA   Plan:   DAPT   Risk profile modification.    See orders     Device Interrogation: 9/1/20   Make/Model Sr Neal Allure  DOI 3/11/19  Mode DDDR 75/120 ppm  Battery Voltage/Longevity: 5.5-5.8 years    Pacing: A: 85%  BV: 91%   P wave: 3.4 mV  Impedance: 460 ohms   Threshold: 0.75 V @ 0.5 ms  RV R wave: 9.5 mV  Impedance: 460 ohms   Threshold: 1 V @ 1.0 ms  LV Impedance:900ohms   Threshold: 1 V @ 0.5 ms  Episodes: AF burden: <1%  - 20 HVR events  Reprogramming included: see below  Overall device function is normal    All device programmable settings were evaluated per above and in the scanned document, along with iterative adjustments (capture thresholds) to assess and select the most appropriate final programming to provide for consistent delivery of the appropriate therapy and to verify function of the device. Impression:    1. Persistent atrial fibrillation  - Asymptomatic.  - [id number] of 5.  - On Toprol XL for rate control.  - Developed anorexia/elevated liver enzymes with Digoxin and Amiodarone in the past.  - Started on Sotalol 120 mg QOD on 6/10/19 and stopped Sotalol by herself in November of 2019  - S/p CV 6/13/19  - On Eliquis 2.5 mg bid for stroke risk reduction.   - AF burden <1%. - In sinus today. - Re-education on importance of well controlled HTN (goal BP < 130/80), adequate weight control (goal BMI of < 27), physical activity consisting of moderate cardiopulmonary exercise up to a goal of 250 min/wk, daily compliance with CPAP in treating sleep apnea, smoking cessation and limited ETOH intake. 2. CRT-P in situ  - S/p St Neal dual chamber pacemaker on 5/4/12.  - S/p CRT-P upgrade 3/11/19.  - Proper device function.  - Bi- 91%     3. Coronary artery disease  - S/p PCI of RCA on 6/26/17.  - On Toprol XL and Pravahol.      4. Hypertension  - Well controlled. - On Lasixand Toprol. 5. Hyperlipidemia  - On Pravachol.     6. Hypothyroidism  - On Synthroid.     7. CKD     8. Dizziness   - From orthostatic hypotension     9. PSVT  - On Toprol XL. Recommendation:    1. Increase Toprol XL to 75 mg bid for better control of PSVT. 2. Continue Eliquis 2.5 mg bid. 3. Urged follow up with pulmonology with regards to her SOB. 4. Merlin remote monitoring every 91 days. 5. Follow-up in 6 months.  Encouraged the patient to call the office for any questions or concerns. Thank you for allowing me to participate in their care. I have spent a total of 30 minutes with the patient and his/her family reviewing the above stated recommendations.  And a total of >50% of that time involved face-to-face time providing counseling and or coordination of care with the other providers    Hailee Lamar MD  Cardiac Electrophysiology  [address]  [facility]  10:07 AM  9/1/2020

## 2020-09-01 ENCOUNTER — OFFICE VISIT (OUTPATIENT)
Dept: NON INVASIVE DIAGNOSTICS | Age: 85
End: 2020-09-01
Payer: MEDICARE

## 2020-09-01 VITALS
DIASTOLIC BLOOD PRESSURE: 74 MMHG | RESPIRATION RATE: 22 BRPM | BODY MASS INDEX: 29.19 KG/M2 | TEMPERATURE: 98.4 F | WEIGHT: 186 LBS | HEIGHT: 67 IN | HEART RATE: 72 BPM | SYSTOLIC BLOOD PRESSURE: 126 MMHG

## 2020-09-01 PROCEDURE — 93290 INTERROG DEV EVAL ICPMS IP: CPT | Performed by: INTERNAL MEDICINE

## 2020-09-01 PROCEDURE — 1090F PRES/ABSN URINE INCON ASSESS: CPT | Performed by: INTERNAL MEDICINE

## 2020-09-01 PROCEDURE — G8427 DOCREV CUR MEDS BY ELIG CLIN: HCPCS | Performed by: INTERNAL MEDICINE

## 2020-09-01 PROCEDURE — 1036F TOBACCO NON-USER: CPT | Performed by: INTERNAL MEDICINE

## 2020-09-01 PROCEDURE — 1123F ACP DISCUSS/DSCN MKR DOCD: CPT | Performed by: INTERNAL MEDICINE

## 2020-09-01 PROCEDURE — 99214 OFFICE O/P EST MOD 30 MIN: CPT | Performed by: INTERNAL MEDICINE

## 2020-09-01 PROCEDURE — G8400 PT W/DXA NO RESULTS DOC: HCPCS | Performed by: INTERNAL MEDICINE

## 2020-09-01 PROCEDURE — 93281 PM DEVICE PROGR EVAL MULTI: CPT | Performed by: INTERNAL MEDICINE

## 2020-09-01 PROCEDURE — G8417 CALC BMI ABV UP PARAM F/U: HCPCS | Performed by: INTERNAL MEDICINE

## 2020-09-01 PROCEDURE — 4040F PNEUMOC VAC/ADMIN/RCVD: CPT | Performed by: INTERNAL MEDICINE

## 2020-09-01 RX ORDER — METOPROLOL SUCCINATE 50 MG/1
50 TABLET, EXTENDED RELEASE ORAL 2 TIMES DAILY
COMMUNITY
End: 2020-09-01 | Stop reason: SDUPTHER

## 2020-09-01 RX ORDER — METOPROLOL SUCCINATE 50 MG/1
75 TABLET, EXTENDED RELEASE ORAL 2 TIMES DAILY
Qty: 270 TABLET | Refills: 1 | Status: SHIPPED
Start: 2020-09-01 | End: 2021-03-01

## 2020-09-08 ENCOUNTER — NURSE ONLY (OUTPATIENT)
Dept: NON INVASIVE DIAGNOSTICS | Age: 85
End: 2020-09-08
Payer: MEDICARE

## 2020-09-08 PROCEDURE — 93296 REM INTERROG EVL PM/IDS: CPT | Performed by: INTERNAL MEDICINE

## 2020-09-08 PROCEDURE — 93294 REM INTERROG EVL PM/LDLS PM: CPT | Performed by: INTERNAL MEDICINE

## 2020-09-08 NOTE — PROGRESS NOTES
See [facility] [place] report. Remote monitoring reviewed over a 90 day period.   End of 90 day monitoring period date of service 09/08/2020

## 2020-09-23 ENCOUNTER — OFFICE VISIT (OUTPATIENT)
Dept: CARDIOLOGY CLINIC | Age: 85
End: 2020-09-23
Payer: MEDICARE

## 2020-09-23 VITALS
SYSTOLIC BLOOD PRESSURE: 126 MMHG | BODY MASS INDEX: 29.35 KG/M2 | DIASTOLIC BLOOD PRESSURE: 72 MMHG | HEART RATE: 76 BPM | RESPIRATION RATE: 20 BRPM | WEIGHT: 187 LBS | HEIGHT: 67 IN

## 2020-09-23 PROCEDURE — G8427 DOCREV CUR MEDS BY ELIG CLIN: HCPCS | Performed by: INTERNAL MEDICINE

## 2020-09-23 PROCEDURE — 4040F PNEUMOC VAC/ADMIN/RCVD: CPT | Performed by: INTERNAL MEDICINE

## 2020-09-23 PROCEDURE — 1123F ACP DISCUSS/DSCN MKR DOCD: CPT | Performed by: INTERNAL MEDICINE

## 2020-09-23 PROCEDURE — 93000 ELECTROCARDIOGRAM COMPLETE: CPT | Performed by: INTERNAL MEDICINE

## 2020-09-23 PROCEDURE — 1036F TOBACCO NON-USER: CPT | Performed by: INTERNAL MEDICINE

## 2020-09-23 PROCEDURE — 99214 OFFICE O/P EST MOD 30 MIN: CPT | Performed by: INTERNAL MEDICINE

## 2020-09-23 PROCEDURE — 1090F PRES/ABSN URINE INCON ASSESS: CPT | Performed by: INTERNAL MEDICINE

## 2020-09-23 PROCEDURE — G8400 PT W/DXA NO RESULTS DOC: HCPCS | Performed by: INTERNAL MEDICINE

## 2020-09-23 PROCEDURE — G8417 CALC BMI ABV UP PARAM F/U: HCPCS | Performed by: INTERNAL MEDICINE

## 2020-09-23 RX ORDER — LEVOTHYROXINE SODIUM 112 UG/1
1 TABLET ORAL
COMMUNITY
Start: 2020-09-02

## 2020-09-23 NOTE — PROGRESS NOTES
education level: Not on file   Occupational History    Not on file   Social Needs    Financial resource strain: Not on file    Food insecurity     Worry: Not on file     Inability: Not on file    Transportation needs     Medical: Not on file     Non-medical: Not on file   Tobacco Use    Smoking status: Never Smoker    Smokeless tobacco: Never Used   Substance and Sexual Activity    Alcohol use: No    Drug use: No    Sexual activity: Not on file   Lifestyle    Physical activity     Days per week: Not on file     Minutes per session: Not on file    Stress: Not on file   Relationships    Social connections     Talks on phone: Not on file     Gets together: Not on file     Attends Zoroastrian service: Not on file     Active member of club or organization: Not on file     Attends meetings of clubs or organizations: Not on file     Relationship status: Not on file    Intimate partner violence     Fear of current or ex partner: Not on file     Emotionally abused: Not on file     Physically abused: Not on file     Forced sexual activity: Not on file   Other Topics Concern    Not on file   Social History Narrative    Not on file       History reviewed. No pertinent family history. Review of Systems:  Heart: as above   Lungs: as above   Eyes: denies changes in vision or discharge. Ears: denies changes in hearing or pain. Nose: denies epistaxis or masses   Throat: denies sore throat or trouble swallowing. Neuro: denies numbness, tingling, tremors. Skin: denies rashes or itching. : denies hematuria, dysuria   GI: denies vomiting, diarrhea   Psych: denies mood changed, anxiety, depression. All other systems negative. Physical Exam   /72   Pulse 76   Resp 20   Ht 5' 7\" (1.702 m)   Wt 187 lb (84.8 kg)   BMI 29.29 kg/m²   Constitutional: Oriented to person, place, and time. Well-developed and well-nourished. No distress. Head: Normocephalic and atraumatic.    Eyes: EOM are normal. Pupils are equal, round, and reactive to light. Neck: Normal range of motion. Neck supple. No hepatojugular reflux and no JVD present. Carotid bruit is not present. No tracheal deviation present. No thyromegaly present. Cardiovascular: Normal rate, regular rhythm, normal heart sounds and intact distal pulses. Exam reveals no gallop and no friction rub. No murmur heard. Pulmonary/Chest: Effort normal and breath sounds normal. No respiratory distress. No wheezes. No rales. No tenderness. Abdominal: Soft. Bowel sounds are normal. No distension and no mass. No tenderness. No rebound and no guarding. Musculoskeletal: Normal range of motion. No edema and no tenderness. Lymphadenopathy:   No cervical adenopathy. No groin adenopathy. Neurological: Alert and oriented to person, place, and time. Skin: Skin is warm and dry. No rash noted. Not diaphoretic. No erythema. Psychiatric: Normal mood and affect. Behavior is normal.     EKG:  A-V paced. Cath Findings 6/26/17:   Left main: 0% stenosis  LAD: mild, calcific disease  Circumflex: mild disease   RCA: Dominant. 80 % mid stenosis. Hemodynamics: Ao: 115/63  Interventional procedure:   1. PCI vessel: RCA. Lesion type: B. JIM III flow pre PCI. Angioplasty performed with 2.5 balloon. Stenting performed with Alpine DANE 2.75 X 18 . Result: 0% residual stenosis and JIM III distal flow. ASSESSMENT AND PLAN:  Patient Active Problem List   Diagnosis    Chest pain    Persistent atrial fibrillation    Essential hypertension    HLD (hyperlipidemia)    Hypothyroidism    Acute on chronic diastolic heart failure ([address]    Pure hypercholesterolemia    Hyperthyroidism    Paced rhythm on electrocardiogram (ECG)    Pacemaker    Biventricular cardiac pacemaker in situ    CKD (chronic kidney disease)    Spinal stenosis of lumbar region     1. GARG:    Stable echo 1/28/2020. Stress normal 6/12/2020.     2. CAD: Asymptomatic. Continue ASA/BB/statin. 3. Afib: In sinus. On Eliquis. (avoid digoxin in future as she had anorexia with it in past)    4. Pacer: Per DOROTHY. Alfonso Vivas D.O.   Cardiologist  Cardiology, [address] [facility]

## 2020-12-08 ENCOUNTER — NURSE ONLY (OUTPATIENT)
Dept: NON INVASIVE DIAGNOSTICS | Age: 85
End: 2020-12-08
Payer: MEDICARE

## 2020-12-08 PROCEDURE — 93296 REM INTERROG EVL PM/IDS: CPT | Performed by: INTERNAL MEDICINE

## 2020-12-08 PROCEDURE — 93294 REM INTERROG EVL PM/LDLS PM: CPT | Performed by: INTERNAL MEDICINE

## 2020-12-08 NOTE — PROGRESS NOTES
See [employer] [place] report. Remote monitoring reviewed over a 90 day period.   End of 90 day monitoring period date of service 12/08/2020

## 2021-01-11 RX ORDER — APIXABAN 2.5 MG/1
TABLET, FILM COATED ORAL
Qty: 180 TABLET | Refills: 3 | Status: SHIPPED
Start: 2021-01-11 | End: 2022-01-10

## 2021-02-25 NOTE — PROGRESS NOTES
Cardiac Electrophysiology Outpatient Progress Note    Kim Whitmore  1935  Date of Service: 2/26/2021  Referring Provider/PCP: Jaime Fernandez   Cardiologist: Dayne Donovan DO  Electrophysiologist: Veronica Garcia MD    SUBJECTIVE: Mendel Huntley presents to the office today for the management of these Electrophysiology conditions: persistent AF and CRT-P in situ. She was last seen by Dr. Ebony Hull on 09/01/20 and complained of dyspnea on exertion at that time, and her Toprol was increased to 75Mg BiD due to PSVT. Today she presents in AP-BiV paced rhythm with 95%  Bi-V pacing, her device site is well healed without erosion or migration. She complains of lightheadedness with walking as well as a \"fog\" in the afternoon as well as palpitation. She has no complaints of syncope, near syncope, orthopnea, PND. Today her device check showed 13 HRE longest 3min 10 sec. compared to her last office  Where she had 179 HVR episodes longest 26min 25sec. She remains compliant with Eliquis and Toprol without complaints of bleeding.        Patient Active Problem List    Diagnosis Date Noted    Spinal stenosis of lumbar region 03/02/2020    CKD (chronic kidney disease) 03/12/2019    Biventricular cardiac pacemaker in situ     Pacemaker 03/11/2019    Paced rhythm on electrocardiogram (ECG)     Pure hypercholesterolemia     Hyperthyroidism     Chest pain 06/03/2017    Persistent atrial fibrillation ([address] 06/03/2017    Essential hypertension 06/03/2017    HLD (hyperlipidemia) 06/03/2017    Hypothyroidism 06/03/2017    Acute on chronic diastolic heart failure (HCC)        Current Outpatient Medications   Medication Sig Dispense Refill    ELIQUIS 2.5 MG TABS tablet TAKE ONE TABLET BY MOUTH TWO TIMES A  tablet 3    levothyroxine (SYNTHROID) 112 MCG tablet Take 1 tablet by mouth every morning (before breakfast)      metoprolol succinate (TOPROL XL) 50 MG extended release tablet Take 1.5 tablets by mouth cyanosis. No edema. Psychiatric: Normal mood and affect. Thought content normal.   Pacemaker site: stable,well healed, no evidence of erosion. Pertinent Cardiac Testing:     Stress test 6/12/20     Conclusion>         The stress and resting images show normal perfusion with an         attenuation artifact.         The left ventricle is normal size.         The left ventricular systolic function is hyperdynamic.         Left ventricular wall motion is normal.         There is a medium sized area of mildly reduced uptake in the apical         segment of the anterior wall which is seen on the stress images as         well as the resting images. This area thickens and moves normally and         is most consistent with attenuation artifact.         Considering the quantitative measurements the study suggests a low         risk for adverse myocardial events.                                            ** REPORT SIGNED IN OTHER VENDOR SYSTEM 06/12/2020 **                        Reported ByBryanna Steele MD      Echocardiogram 1/28/20:   Conclusion>        Mild to moderate concentric left ventricular hypertrophy.        LVEF is 55-60%.      The diastolic function is indeterminate.        There is normal LV segmental wall motion.        The right ventricular systolic function is normal.        Left atrium is severely dilated.        Right atrium is severely dilated.        There is moderate mitral annular calcification.        Mild mitral regurgitation.        Moderate tricuspid regurgitation.                                         ** REPORT SIGNED IN OTHER VENDOR SYSTEM 01/28/2020 **                       Reported By: Bjorn Eli MD     Cardiac Catheterization 6/26/17:   PROCEDURE: Left heart catheterization, coronary angiography, PCI of RCA. TECHNIQUE: Right radial access was obtained with the aid of ultrasound   guidance. A glide sheath slender 6 was placed with no difficulty.  Coronary   angiography was performed with a TIG catheter for the right coronary artery   and a JL3.5 for the left coronary artery. The aortic valve was not crossed. Heparin was administered and guided by ACT determination. The patient had   received only aspirin yesterday and was given 600 mg oral Plavix in the lab. A 6-Gambian JR4 guiding catheter with side holes was used for the right   coronary artery which provided excellent backup. Floppy J-wire was advanced   to the distal portion of the vessel, and the lesion was predilated with a 2.5   x 15 mm balloon and stented with an Alpine 2.75 x 18 mm stent. There were no   complications. Indication:   1. Unstable angina   2. Catheterization: AUC score 8 . Indication 4.   3. PCI: AUC score 9 Indication 11. Procedure: Left Heart Catheterization, coronary angiography, percutaneous coronary intervention to RCA   Anesthesia: Fentanyl, benadryl   Time sedation was administered: 0841. I was present in the room when sedation was administered. Procedure end time: 8204   Time spent with face to face monitoring of moderate sedation: 45 min   Cardiac cath performed via right radial approach using Slender 6 sheath. Findings:   Left main: 0% stenosis   LAD: mild, calcific disease   Circumflex: mild disease   RCA: Dominant. 80 % mid stenosis. Hemodynamics: Ao: 115/63   Interventional procedure:   1. PCI vessel: RCA. Lesion type: B. JIM III flow pre PCI. Angioplasty performed with 2.5 balloon. Stenting performed with Alpine DANE 2.75 X 18 . Result: 0% residual stenosis and JIM III distal flow. Drugs: . Anticoagulation was maintained with heparin . 600 mg Plavix load given in cath lab. Right radial sheath: pulled, TR band applied. There was good distal pulses in the RUE at the end of the procedure. Complication: None   Blood loss: 5 cc   Contrast used: 91cc   Post op diagnosis:   Unstable Angina   PCI of RCA   Plan:   DAPT   Risk profile modification.    See orders     Device Interrogation ( 02/26/21 )  Make/Model St. Neal Allure  Mode DDDR 75/120  P wave: 3.2 mV  Impedance: 460 ohms   Threshold: 0.75 V @ 0.5 ms  RV R wave: 11.0 mV  Impedance: 440 ohms   Threshold: 1.0 V @ 1.0 ms  LV R wave: Impedance:830 ohms   Threshold:1.0 V @ 0.5 ms  Pacing: A: 92%  Bi-V: 95%    Battery Voltage/Longevity:  5.7 years     Arrhythmias: 18 AMS longest 1 min 20 sec. . 22 VHR 10 sec to 1 min 37 sec. Reprogramming included counter cleared   Overall device function is normal  All device programmable settings were evaluated per above and in the scanned document, along with iterative adjustments (capture thresholds) to assess and select the most appropriate final programming to provide for consistent delivery of the appropriate therapy and to verify function of the device. Impression:    1. Persistent atrial fibrillation  - Asymptomatic.  - [id number] of 5.  - On Toprol XL for rate control.  - Developed anorexia/elevated liver enzymes with Digoxin and Amiodarone in the past.  - Started on Sotalol 120 mg QOD on 6/10/19 and stopped Sotalol by herself in November of 2019  - S/p CV 6/13/19  - On Eliquis 2.5 mg bid for stroke risk reduction. (age 81, Wt80.5, last creat 1.4)  - AF burden <1%. With less HVR episodes   - In sinus today. - Re-education on importance of well controlled HTN (goal BP < 130/80), adequate weight control (goal BMI of < 27), physical activity consisting of moderate cardiopulmonary exercise up to a goal of 250 min/wk, daily compliance with CPAP in treating sleep apnea, smoking cessation and limited ETOH intake. 2. CRT-P in situ  - S/p St Neal dual chamber pacemaker on 5/4/12.  - S/p CRT-P upgrade 3/11/19.  - Proper device function.  - Bi-VP95%     3. Coronary artery disease  - S/p PCI of RCA on 6/26/17.  - On Toprol XL and Pravahol.      4. Hypertension  - Well controlled. - On Lasix and Toprol. 5. Hyperlipidemia  - On Pravachol.     6. Hypothyroidism  - On Synthroid.     7. CKD     8. Dizziness   - From orthostatic hypotension     9. PSVT  - On Toprol XL.    10. Complaints of a \"fog\"   - Cannot rule out S/E of Toprol however the family notes that she recently had lab work that showed altered renal function would not change Toprol at this time given its success in reducing HVR episodes. This was discussed with the granddaughter at bedside. However in future if no other cause of \"fogginess\" is found can consider switching to Cardizem. Recommendation:    1. Continue Toprol XL to 75 mg bid  2. Continue Eliquis 2.5 mg bid. 3. Will await recent lab work from Dr. Gold [facility] office. 4. Merlin remote monitoring every 91 days. 5. Follow-up in 6 months with Dr. Barney Traylor. Encouraged the patient to call the office for any questions or concerns. Thank you for allowing me to participate in their care. I have spent a total of 25 minutes with the patient and his/her family reviewing the above stated recommendations.  And a total of >50% of that time involved face-to-face time providing counseling and or coordination of care with the other providers    Jannie Romberg, APRN - CNP  Cardiac Electrophysiology  [facility]) Physicians  [facility] [place]: [facility]  10:56 AM  2/26/2021

## 2021-02-26 ENCOUNTER — OFFICE VISIT (OUTPATIENT)
Dept: NON INVASIVE DIAGNOSTICS | Age: 86
End: 2021-02-26
Payer: MEDICARE

## 2021-02-26 VITALS
HEART RATE: 76 BPM | WEIGHT: 182 LBS | HEIGHT: 61 IN | RESPIRATION RATE: 18 BRPM | SYSTOLIC BLOOD PRESSURE: 126 MMHG | BODY MASS INDEX: 34.36 KG/M2 | DIASTOLIC BLOOD PRESSURE: 72 MMHG

## 2021-02-26 DIAGNOSIS — Z95.0 PACEMAKER: Primary | ICD-10-CM

## 2021-02-26 PROCEDURE — 1123F ACP DISCUSS/DSCN MKR DOCD: CPT | Performed by: NURSE PRACTITIONER

## 2021-02-26 PROCEDURE — G8484 FLU IMMUNIZE NO ADMIN: HCPCS | Performed by: NURSE PRACTITIONER

## 2021-02-26 PROCEDURE — 93290 INTERROG DEV EVAL ICPMS IP: CPT | Performed by: NURSE PRACTITIONER

## 2021-02-26 PROCEDURE — G8417 CALC BMI ABV UP PARAM F/U: HCPCS | Performed by: NURSE PRACTITIONER

## 2021-02-26 PROCEDURE — G8427 DOCREV CUR MEDS BY ELIG CLIN: HCPCS | Performed by: NURSE PRACTITIONER

## 2021-02-26 PROCEDURE — 1036F TOBACCO NON-USER: CPT | Performed by: NURSE PRACTITIONER

## 2021-02-26 PROCEDURE — 4040F PNEUMOC VAC/ADMIN/RCVD: CPT | Performed by: NURSE PRACTITIONER

## 2021-02-26 PROCEDURE — 1090F PRES/ABSN URINE INCON ASSESS: CPT | Performed by: NURSE PRACTITIONER

## 2021-02-26 PROCEDURE — 99213 OFFICE O/P EST LOW 20 MIN: CPT | Performed by: NURSE PRACTITIONER

## 2021-02-26 PROCEDURE — 93281 PM DEVICE PROGR EVAL MULTI: CPT | Performed by: NURSE PRACTITIONER

## 2021-02-26 PROCEDURE — G8400 PT W/DXA NO RESULTS DOC: HCPCS | Performed by: NURSE PRACTITIONER

## 2021-02-27 DIAGNOSIS — I48.19 PERSISTENT ATRIAL FIBRILLATION (HCC): Chronic | ICD-10-CM

## 2021-03-01 RX ORDER — METOPROLOL SUCCINATE 50 MG/1
TABLET, EXTENDED RELEASE ORAL
Qty: 270 TABLET | Refills: 6 | Status: SHIPPED
Start: 2021-03-01 | End: 2022-01-04 | Stop reason: DRUGHIGH

## 2021-03-01 RX ORDER — FUROSEMIDE 20 MG/1
TABLET ORAL
Qty: 180 TABLET | Refills: 0 | Status: ON HOLD
Start: 2021-03-01 | End: 2022-03-26 | Stop reason: HOSPADM

## 2021-03-08 ENCOUNTER — NURSE ONLY (OUTPATIENT)
Dept: NON INVASIVE DIAGNOSTICS | Age: 86
End: 2021-03-08

## 2021-03-08 DIAGNOSIS — I48.19 PERSISTENT ATRIAL FIBRILLATION (HCC): Primary | ICD-10-CM

## 2021-03-08 DIAGNOSIS — Z95.0 BIVENTRICULAR CARDIAC PACEMAKER IN SITU: ICD-10-CM

## 2021-03-08 DIAGNOSIS — Z95.0 PACEMAKER: ICD-10-CM

## 2021-03-08 PROCEDURE — 93294 REM INTERROG EVL PM/LDLS PM: CPT | Performed by: INTERNAL MEDICINE

## 2021-03-08 PROCEDURE — 93296 REM INTERROG EVL PM/IDS: CPT | Performed by: INTERNAL MEDICINE

## 2021-03-08 NOTE — PROGRESS NOTES
See [employer] report. Remote monitoring reviewed over a 90 day period.   End of 90 day monitoring period date of service 03/08/2021

## 2021-04-11 DIAGNOSIS — I48.19 PERSISTENT ATRIAL FIBRILLATION (HCC): Chronic | ICD-10-CM

## 2021-04-12 RX ORDER — PRAVASTATIN SODIUM 40 MG
TABLET ORAL
Qty: 90 TABLET | Refills: 0 | Status: SHIPPED
Start: 2021-04-12 | End: 2021-09-08 | Stop reason: SDUPTHER

## 2021-04-28 ENCOUNTER — OFFICE VISIT (OUTPATIENT)
Dept: CARDIOLOGY CLINIC | Age: 86
End: 2021-04-28
Payer: MEDICARE

## 2021-04-28 VITALS
BODY MASS INDEX: 28.25 KG/M2 | DIASTOLIC BLOOD PRESSURE: 68 MMHG | RESPIRATION RATE: 20 BRPM | SYSTOLIC BLOOD PRESSURE: 118 MMHG | HEART RATE: 76 BPM | WEIGHT: 180 LBS | HEIGHT: 67 IN

## 2021-04-28 DIAGNOSIS — I10 ESSENTIAL HYPERTENSION: Primary | Chronic | ICD-10-CM

## 2021-04-28 PROCEDURE — 4040F PNEUMOC VAC/ADMIN/RCVD: CPT | Performed by: INTERNAL MEDICINE

## 2021-04-28 PROCEDURE — 1090F PRES/ABSN URINE INCON ASSESS: CPT | Performed by: INTERNAL MEDICINE

## 2021-04-28 PROCEDURE — G8417 CALC BMI ABV UP PARAM F/U: HCPCS | Performed by: INTERNAL MEDICINE

## 2021-04-28 PROCEDURE — 99214 OFFICE O/P EST MOD 30 MIN: CPT | Performed by: INTERNAL MEDICINE

## 2021-04-28 PROCEDURE — 1123F ACP DISCUSS/DSCN MKR DOCD: CPT | Performed by: INTERNAL MEDICINE

## 2021-04-28 PROCEDURE — G8427 DOCREV CUR MEDS BY ELIG CLIN: HCPCS | Performed by: INTERNAL MEDICINE

## 2021-04-28 PROCEDURE — 93000 ELECTROCARDIOGRAM COMPLETE: CPT | Performed by: INTERNAL MEDICINE

## 2021-04-28 PROCEDURE — 1036F TOBACCO NON-USER: CPT | Performed by: INTERNAL MEDICINE

## 2021-04-28 PROCEDURE — G8400 PT W/DXA NO RESULTS DOC: HCPCS | Performed by: INTERNAL MEDICINE

## 2021-04-28 NOTE — PROGRESS NOTES
 Hyperlipidemia     Hypertension     Hypothyroidism     Vertigo        Patient Active Problem List   Diagnosis    Chest pain    Persistent [facility]    Essential hypertension    HLD (hyperlipidemia)    Hypothyroidism    Acute on chronic diastolic heart failure ([facility]    Pure hypercholesterolemia    Hyperthyroidism    Paced rhythm on electrocardiogram (ECG)    Pacemaker    Biventricular cardiac pacemaker in situ    CKD (chronic kidney disease)    Spinal stenosis of lumbar region       Allergies   Allergen Reactions    Acetaminophen      Other reaction(s): UPSET STOMACH AND NAUSEA    Amiodarone Other (See Comments)     Causes issues with liver enzymes.  Hydrocodone      Other reaction(s): UPSET STOMACH AND NAUSEA       Current Outpatient Medications   Medication Sig Dispense Refill    pravastatin (PRAVACHOL) 40 MG tablet TAKE ONE TABLET BY MOUTH DAILY 90 tablet 0    furosemide (LASIX) 20 MG tablet TAKE TWO TABLETS BY MOUTH DAILY (Patient taking differently: 10 mg 2 times daily ) 180 tablet 0    metoprolol succinate (TOPROL XL) 50 MG extended release tablet TAKE 1 & 1/2 TABLETS BY MOUTH TWICE A  tablet 6    ELIQUIS 2.5 MG TABS tablet TAKE ONE TABLET BY MOUTH TWO TIMES A  tablet 3    levothyroxine (SYNTHROID) 112 MCG tablet Take 1 tablet by mouth every morning (before breakfast)      LORazepam (ATIVAN) 0.5 MG tablet TAKE ONE TABLET BY MOUTH DAILY AS NEEDED  3    Cholecalciferol (VITAMIN D PO) Take 4,000 Units by mouth daily        No current facility-administered medications for this visit. Social History     Socioeconomic History    Marital status:       Spouse name: Not on file    Number of children: Not on file    Years of education: Not on file    Highest education level: Not on file   Occupational History    Not on file   Social Needs    Financial resource strain: Not on file    Food insecurity     Worry: Not on file     Inability: Not on file   [employer] needs     Medical: Not on file     Non-medical: Not on file   Tobacco Use    Smoking status: Never Smoker    Smokeless tobacco: Never Used   Substance and Sexual Activity    Alcohol use: No    Drug use: No    Sexual activity: Not on file   Lifestyle    Physical activity     Days per week: Not on file     Minutes per session: Not on file    Stress: Not on file   Relationships    Social connections     Talks on phone: Not on file     Gets together: Not on file     Attends Anabaptist service: Not on file     Active member of club or organization: Not on file     Attends meetings of clubs or organizations: Not on file     Relationship status: Not on file    Intimate partner violence     Fear of current or ex partner: Not on file     Emotionally abused: Not on file     Physically abused: Not on file     Forced sexual activity: Not on file   Other Topics Concern    Not on file   Social History Narrative    Not on file       History reviewed. No pertinent family history. Review of Systems:  Heart: as above   Lungs: as above   Eyes: denies changes in vision or discharge. Ears: denies changes in hearing or pain. Nose: denies epistaxis or masses   Throat: denies sore throat or trouble swallowing. Neuro: denies numbness, tingling, tremors. Skin: denies rashes or itching. : denies hematuria, dysuria   GI: denies vomiting, diarrhea   Psych: denies mood changed, anxiety, depression. All other systems negative. Physical Exam   /68   Pulse 76   Resp 20   Ht 5' 7\" (1.702 m)   Wt 180 lb (81.6 kg)   BMI 28.19 kg/m²   Constitutional: Oriented to person, place, and time. Well-developed and well-nourished. No distress. Head: Normocephalic and atraumatic. Eyes: EOM are normal. Pupils are equal, round, and reactive to light. Neck: Normal range of motion. Neck supple. No hepatojugular reflux and no JVD present. Carotid bruit is not present.  No tracheal deviation present. No thyromegaly present. Cardiovascular: Normal rate, regular rhythm, normal heart sounds and intact distal pulses. Exam reveals no gallop and no friction rub. No murmur heard. Pulmonary/Chest: Effort normal and breath sounds normal. No respiratory distress. No wheezes. No rales. No tenderness. Abdominal: Soft. Bowel sounds are normal. No distension and no mass. No tenderness. No rebound and no guarding. Musculoskeletal: Normal range of motion. No edema and no tenderness. Lymphadenopathy:   No cervical adenopathy. No groin adenopathy. Neurological: Alert and oriented to person, place, and time. Skin: Skin is warm and dry. No rash noted. Not diaphoretic. No erythema. Psychiatric: Normal mood and affect. Behavior is normal.     EKG:  A-V paced. Cath Findings 6/26/17:   Left main: 0% stenosis  LAD: mild, calcific disease  Circumflex: mild disease   RCA: Dominant. 80 % mid stenosis. Hemodynamics: Ao: 115/63  Interventional procedure:   1. PCI vessel: RCA. Lesion type: B. JIM III flow pre PCI. Angioplasty performed with 2.5 balloon. Stenting performed with Alpine DANE 2.75 X 18 . Result: 0% residual stenosis and JIM III distal flow. ASSESSMENT AND PLAN:  Patient Active Problem List   Diagnosis    Chest pain    Persistent atrial fibrillation ([address]    Essential hypertension    HLD (hyperlipidemia)    Hypothyroidism    Acute on chronic diastolic heart failure ([address]    Pure hypercholesterolemia    Hyperthyroidism    Paced rhythm on electrocardiogram (ECG)    Pacemaker    Biventricular cardiac pacemaker in situ    CKD (chronic kidney disease)    Spinal stenosis of lumbar region     1. GAGR:    Stable echo 1/28/2020. Stress normal 6/12/2020.     2. CAD: Asymptomatic. Continue ASA/BB/statin. 3. Afib: In sinus. On Eliquis. (avoid digoxin in future as she had anorexia with it in past)    4. Pacer: Per EP. 5. CKD: Follow labs. 6. Hypothyroid: On HRT.     Jerry Gip. Carol Ann Durant.   Cardiologist  Cardiology, [facility]

## 2021-06-07 ENCOUNTER — NURSE ONLY (OUTPATIENT)
Dept: NON INVASIVE DIAGNOSTICS | Age: 86
End: 2021-06-07
Payer: MEDICARE

## 2021-06-07 DIAGNOSIS — I48.19 PERSISTENT ATRIAL FIBRILLATION (HCC): ICD-10-CM

## 2021-06-07 DIAGNOSIS — Z95.0 PACEMAKER: Primary | ICD-10-CM

## 2021-06-07 PROCEDURE — 93294 REM INTERROG EVL PM/LDLS PM: CPT | Performed by: INTERNAL MEDICINE

## 2021-06-07 PROCEDURE — 93296 REM INTERROG EVL PM/IDS: CPT | Performed by: INTERNAL MEDICINE

## 2021-08-31 NOTE — PROGRESS NOTES
Cardiac Electrophysiology Outpatient Progress Note    Juliann Pinon  1935  Date of Service: 9/3/2021  Referring Provider/PCP: Chantale Guzman   Cardiologist: Cas Castellanos DO  Electrophysiologist: Irma Michelle MD    SUBJECTIVE: Camellia Brittle Felger presents for outpatient follow-up and CRT-P check. Her device is followed remotely through our office clinic. In August, she had episodes of PAF with the longest in duration ~2 min, BiVp 96%, AF burden <1%  on 8/10/21. An episodes of PSVT occurred falling into the VT zone lasting ~19 seconds on 8/21/21 transmission. She remains on  Toprol XL 75 mg BID for arrhythmia suppression and HR control as well as Eliquis for stroke-risk reduction. She has no reported bleeding issues. Her current Eliqus dose is 2.5 mg BID due to fluctuation of Cr level. A TTE in 1/2020 shows an LVEF 55-60% with severely dilated atria. Her major complaint today is being \"unfocused\" . Her device site looks well healed and free from infection or erosion. She offers no complaints from a device POV. Currently denies any chest pain, syncope, dyspnea on exertion, paroxysmal nocturnal dyspnea and palpitations. The patient continues to be followed remotely.      Patient Active Problem List    Diagnosis Date Noted    Spinal stenosis of lumbar region 03/02/2020    CKD (chronic kidney disease) 03/12/2019    Biventricular cardiac pacemaker in situ     Pacemaker 03/11/2019    Paced rhythm on electrocardiogram (ECG)     Pure hypercholesterolemia     Hyperthyroidism     Chest pain 06/03/2017    Persistent atrial fibrillation ([address] 06/03/2017    Essential hypertension 06/03/2017    HLD (hyperlipidemia) 06/03/2017    Hypothyroidism 06/03/2017    Acute on chronic diastolic heart failure (HCC)        Current Outpatient Medications   Medication Sig Dispense Refill    ADVAIR DISKUS 250-50 MCG/DOSE AEPB INHALE ONE PUFF BY MOUTH TWO TIMES A DAY      pravastatin (PRAVACHOL) 40 MG tablet TAKE ONE TABLET BY MOUTH DAILY 90 tablet 0    furosemide (LASIX) 20 MG tablet TAKE TWO TABLETS BY MOUTH DAILY (Patient taking differently: Take 10 mg by mouth daily ) 180 tablet 0    metoprolol succinate (TOPROL XL) 50 MG extended release tablet TAKE 1 & 1/2 TABLETS BY MOUTH TWICE A  tablet 6    ELIQUIS 2.5 MG TABS tablet TAKE ONE TABLET BY MOUTH TWO TIMES A  tablet 3    levothyroxine (SYNTHROID) 112 MCG tablet Take 1 tablet by mouth every morning (before breakfast)      LORazepam (ATIVAN) 0.5 MG tablet TAKE ONE TABLET BY MOUTH DAILY AS NEEDED  3    Cholecalciferol (VITAMIN D PO) Take 4,000 Units by mouth daily        No current facility-administered medications for this visit. Allergies   Allergen Reactions    Acetaminophen      Other reaction(s): UPSET STOMACH AND NAUSEA    Amiodarone Other (See Comments)     Causes issues with liver enzymes.  Hydrocodone      Other reaction(s): UPSET STOMACH AND NAUSEA     ROS:   Constitutional: Negative for fever, activity change and appetite change. HENT: Negative for epistaxis. Eyes: Negative for diploplia, blurred vision. Respiratory: Negative for cough, chest tightness, shortness of breath and negative for wheezing. Cardiovascular: pertinent positives in HPI  Gastrointestinal: Negative for abdominal pain and blood in stool. All other review of systems are negative     PHYSICAL EXAM:  Vitals:    09/03/21 0937   BP: 124/74   Pulse: 80   Resp: 16   Weight: 179 lb 3.2 oz (81.3 kg)   Height: 5' 5\" (1.651 m)      Constitutional: Oriented to person, place, and time. Well-developed and cooperative. Head: Normocephalic and atraumatic. Eyes: Conjunctivae are normal.     Cardiovascular: S1 normal, S2 normal and intact distal pulses. Normal rate and rhythm. PMI is not displaced. Pulmonary/Chest: Effort normal and bibasilar crackles. No respiratory distress. Abdominal: Soft. No tenderness.    Musculoskeletal: Normal range of motion of all extremities, no muscle weakness. Neurological: Alert and oriented to person, place, and time. Gait - walker  Skin: Skin is warm and dry. No bruising, no ecchymosis and no rash noted. Extremity: No clubbing or cyanosis. No edema. Psychiatric: Normal mood and affect. Thought content normal.   Pacemaker site: stable, well healed, no evidence of erosion. Pertinent Cardiac Testing:     Stress test 6/12/20     Conclusion>         The stress and resting images show normal perfusion with an         attenuation artifact.         The left ventricle is normal size.         The left ventricular systolic function is hyperdynamic.         Left ventricular wall motion is normal.         There is a medium sized area of mildly reduced uptake in the apical         segment of the anterior wall which is seen on the stress images as         well as the resting images. This area thickens and moves normally and         is most consistent with attenuation artifact.         Considering the quantitative measurements the study suggests a low         risk for adverse myocardial events.                                            ** REPORT SIGNED IN OTHER VENDOR SYSTEM 06/12/2020 **                        Reported ByShadia Rodriguez MD      Echocardiogram 1/28/20:   Conclusion>        Mild to moderate concentric left ventricular hypertrophy.        LVEF is 55-60%.         The diastolic function is indeterminate.        There is normal LV segmental wall motion.        The right ventricular systolic function is normal.        Left atrium is severely dilated.        Right atrium is severely dilated.        There is moderate mitral annular calcification.        Mild mitral regurgitation.        Moderate tricuspid regurgitation.                                         ** REPORT SIGNED IN OTHER VENDOR SYSTEM 01/28/2020 **                       Reported By: Kush Concepcion MD     Cardiac Catheterization 6/26/17:   PROCEDURE: Left heart at the end of the procedure. Complication: None   Blood loss: 5 cc   Contrast used: 91cc   Post op diagnosis:   Unstable Angina   PCI of RCA   Plan:   DAPT   Risk profile modification. See orders     Device Interrogation 9/3/21:  Make/Model St. Neal Allure  Mode DDDR 75/120  P wave: 2.4 mV  Impedance: 460 ohms   Threshold: 0.75 V @ 0.5 ms  RV R wave: 9.2 mV  Impedance: 450 ohms   Threshold: 1.0 V @ 0.5 ms  LV R wave: Impedance: 830 ohms   Threshold: 1.0 V @ 0.5 ms  Pacing: A: 94%  Bi-V: 95%    Battery Voltage/Longevity: 5.9-6.4 years     Arrhythmias: AF burden: <1%  - longest AF episode lasting 6 hours  Reprogramming included see below  Overall device function is normal  All device programmable settings were evaluated per above and in the scanned document, along with iterative adjustments (capture thresholds) to assess and select the most appropriate final programming to provide for consistent delivery of the appropriate therapy and to verify function of the device. Impression:    1. Persistent atrial fibrillation  - Asymptomatic.  - [id number] of 5.  - On Toprol XL for rate control.  - Developed anorexia/elevated liver enzymes with Digoxin and Amiodarone in the past.  - Started on Sotalol 120 mg QOD on 6/10/19 and stopped Sotalol by herself in November of 2019  - S/p CV 6/13/19  - On Eliquis 2.5 mg bid for stroke risk reduction. (age 81, Wt 82.6, and fluctuation of Cr level)  - AF burden <1%. - In sinus today. - Re-education on importance of well controlled HTN (goal BP < 130/80), adequate weight control (goal BMI of < 27), physical activity consisting of moderate cardiopulmonary exercise up to a goal of 250 min/wk, daily compliance with CPAP in treating sleep apnea, smoking cessation and limited ETOH intake. 2. CRT-P in situ  - S/p St Neal dual chamber pacemaker on 5/4/12.  - S/p CRT-P upgrade 3/11/19.  - Normal device function.  - Bi- 95%     3.  Coronary artery disease  - S/p PCI of RCA on

## 2021-09-03 ENCOUNTER — OFFICE VISIT (OUTPATIENT)
Dept: NON INVASIVE DIAGNOSTICS | Age: 86
End: 2021-09-03
Payer: MEDICARE

## 2021-09-03 VITALS
BODY MASS INDEX: 29.85 KG/M2 | DIASTOLIC BLOOD PRESSURE: 74 MMHG | HEIGHT: 65 IN | SYSTOLIC BLOOD PRESSURE: 124 MMHG | HEART RATE: 80 BPM | WEIGHT: 179.2 LBS | RESPIRATION RATE: 16 BRPM

## 2021-09-03 DIAGNOSIS — Z95.0 BIVENTRICULAR CARDIAC PACEMAKER IN SITU: ICD-10-CM

## 2021-09-03 PROCEDURE — 1036F TOBACCO NON-USER: CPT | Performed by: INTERNAL MEDICINE

## 2021-09-03 PROCEDURE — 99215 OFFICE O/P EST HI 40 MIN: CPT | Performed by: INTERNAL MEDICINE

## 2021-09-03 PROCEDURE — G8400 PT W/DXA NO RESULTS DOC: HCPCS | Performed by: INTERNAL MEDICINE

## 2021-09-03 PROCEDURE — 93281 PM DEVICE PROGR EVAL MULTI: CPT | Performed by: INTERNAL MEDICINE

## 2021-09-03 PROCEDURE — 1090F PRES/ABSN URINE INCON ASSESS: CPT | Performed by: INTERNAL MEDICINE

## 2021-09-03 PROCEDURE — 1123F ACP DISCUSS/DSCN MKR DOCD: CPT | Performed by: INTERNAL MEDICINE

## 2021-09-03 PROCEDURE — G8427 DOCREV CUR MEDS BY ELIG CLIN: HCPCS | Performed by: INTERNAL MEDICINE

## 2021-09-03 PROCEDURE — G8417 CALC BMI ABV UP PARAM F/U: HCPCS | Performed by: INTERNAL MEDICINE

## 2021-09-03 PROCEDURE — 4040F PNEUMOC VAC/ADMIN/RCVD: CPT | Performed by: INTERNAL MEDICINE

## 2021-09-08 DIAGNOSIS — I48.19 PERSISTENT ATRIAL FIBRILLATION (HCC): Chronic | ICD-10-CM

## 2021-09-08 RX ORDER — PRAVASTATIN SODIUM 40 MG
TABLET ORAL
Qty: 90 TABLET | Refills: 3 | Status: SHIPPED
Start: 2021-09-08 | End: 2022-09-09

## 2021-11-12 ENCOUNTER — TELEPHONE (OUTPATIENT)
Dept: NON INVASIVE DIAGNOSTICS | Age: 86
End: 2021-11-12

## 2021-11-12 NOTE — TELEPHONE ENCOUNTER
2 HVR episodes detected since 9/3/2021  123 HVR episodes are new since 11/8/2021 alert report, with mean V rates ~158 - 170 bpm  Longest episode occurred 11/8/2021 at 5:35 pm, duration 1:43:41 (H:M:S), mean V rate 158 bpm- no EGM  Remaining episodes were 10s - 20 minutes duration  EGMs show sudden onset, initiating w/ PAC- query SVT/AVNRT, atrial beats in blanking; termination not seen on EGMs  Additional Notes:  On Toprol XL 75mg BID and Eliquis. . Decrease Bi-V pacing noted. Please ask her to increase Toprol XL to 100 mg bid. Thanks.

## 2021-11-15 NOTE — TELEPHONE ENCOUNTER
Called and spoke with patient. Reviewed Dr. Melany Garcia recommendation. Patient states that she does feel her heart skipping beats. Patient agrees to increase Toprol XL 100mg BID. Patient will call when she needs refills and if her symptoms worsen.      Aron Hearn

## 2022-01-04 ENCOUNTER — TELEPHONE (OUTPATIENT)
Dept: CARDIOLOGY CLINIC | Age: 87
End: 2022-01-04

## 2022-01-04 ENCOUNTER — TELEPHONE (OUTPATIENT)
Dept: NON INVASIVE DIAGNOSTICS | Age: 87
End: 2022-01-04

## 2022-01-04 RX ORDER — METOPROLOL SUCCINATE 100 MG/1
100 TABLET, EXTENDED RELEASE ORAL 2 TIMES DAILY
Status: ON HOLD | COMMUNITY
End: 2022-03-26 | Stop reason: HOSPADM

## 2022-01-04 NOTE — TELEPHONE ENCOUNTER
Pt calls office, she stated she spoke with Dr. Reilly Courser office this morning and reviewed her meds. She informed the office that she has been SOB and was instructed to inform Dr. Housotn Mayorga. She called and would like to know what she should be doing.

## 2022-01-04 NOTE — TELEPHONE ENCOUNTER
----- Message from Kathleen Pillai MD sent at 1/4/2022  8:13 AM EST -----  Increase Toprol XL from 75 mg bid to 100 mg bid.  Thanks.  ----- Message -----  From: Breann Waddell RN  Sent: 1/4/2022   7:30 AM EST  To: Kathleen Pillai MD    MEDICATION: METOPROLOL 100 MG BID AND ELIQUIS    Created By: Zakiya Esquivel 01/03/2022 15:53Edited By: Helga Sparrow 01/04/2022 07:06  Tachycardia: SVT  1  685 HVR episodes detected since 9/3/2021    264 episodes are new since 11/28/2021 alert report, with mean V rates ~152 - 173 bpm  17 most recent episodes occurred on 1/2/2021 between 4:53 pm - 8:06 pm, longest duration 17:34 (M:S)  EGMs show sudden onset, initiating w/ PAC- query SVT/AVNRT, atrial beats in blanking; termination not seen on available EGMs

## 2022-01-04 NOTE — TELEPHONE ENCOUNTER
Called pt. Scheduled OV 01/19/2022 @11:30 am and she agreed. Informed her any further questions to call and she agreed.

## 2022-01-04 NOTE — TELEPHONE ENCOUNTER
OV my next week down there. Gavino eHr D.O.   Cardiologist  Cardiology, [address] [facility]

## 2022-01-05 NOTE — TELEPHONE ENCOUNTER
Notified patient Dr. Catrina Osei will continue medications as they are and will continue to observe. Patient verbalized understanding. Patient states she does have an appointment with Dr. Tone Donald in two weeks.     Swapna Young RN, BSN  [place]

## 2022-01-10 RX ORDER — APIXABAN 2.5 MG/1
TABLET, FILM COATED ORAL
Qty: 180 TABLET | Refills: 3 | Status: ON HOLD
Start: 2022-01-10 | End: 2022-03-26 | Stop reason: HOSPADM

## 2022-03-09 ENCOUNTER — TELEPHONE (OUTPATIENT)
Dept: NON INVASIVE DIAGNOSTICS | Age: 87
End: 2022-03-09

## 2022-03-09 NOTE — TELEPHONE ENCOUNTER
Patient's daughter, Mi Yao, called. Her mother was complaining of feeling short of breath yesterday. Rayshawn sent a remote Merlin transmission. I reviewed the transmission. She does have atrial fib. Initially her presenting egm showed af with ventricular rate 150 then by end of 30 second strip ventricular rate was 88. Mi Yao confirmed her mother is presently taking Toprol  mg bid and Eliquis. I will forward the remote transmission to Dr. Thu Han.     Nessa Perry RN, BSN  [facility]

## 2022-03-10 ENCOUNTER — TELEPHONE (OUTPATIENT)
Dept: NON INVASIVE DIAGNOSTICS | Age: 87
End: 2022-03-10

## 2022-03-10 RX ORDER — METOPROLOL SUCCINATE 25 MG/1
25 TABLET, EXTENDED RELEASE ORAL 2 TIMES DAILY
COMMUNITY
End: 2022-03-10 | Stop reason: SDUPTHER

## 2022-03-10 RX ORDER — METOPROLOL SUCCINATE 25 MG/1
25 TABLET, EXTENDED RELEASE ORAL 2 TIMES DAILY
Qty: 180 TABLET | Refills: 1 | Status: ON HOLD
Start: 2022-03-10 | End: 2022-03-26 | Stop reason: HOSPADM

## 2022-03-10 NOTE — TELEPHONE ENCOUNTER
I called patient's daughter, Mi Yao. I informed her of the increase of Toprol to 125 mg bid per Dr. Thu Han. I will have the prescription sent to [facility] in [address] [place] [address] DrLiss DING told Mi Yao the pills will be 25 mg tablets she will take this with her 100 mg tablet twice daily. Rayshawn verbalized understanding.     Nessa Perry RN, BSN  [facility]

## 2022-03-10 NOTE — TELEPHONE ENCOUNTER
----- Message from Angel Pimentel MD sent at 3/9/2022  6:08 PM EST -----  Increase Toprol XL to 125 mg bid. Thanks.  ----- Message -----  From: Aleksandra Lane RN  Sent: 3/9/2022  10:19 AM EST  To: Angel Pimentel MD    [facility] [place]  Tachycardia: AF  1  Stored EGMs are consistent with or suggestive of Atrial Fibrillation  AT [place]: 57%  Total number of events: 0  Additional Notes:  Patient had episodes of AF on 2/24/22 with RVR, ventricular rates 150. Her presenting egm this morning shows AF with RVR rates initially 150 then 88 by the end of the 30 second strip. Medication: Eliquis and Toprol  mg bid (daughter verified medication dose). Patient's daughter stated her mother complained of shortness of breath yesterday. There were no dysrhythmias noted yesterday.

## 2022-03-18 ENCOUNTER — TELEPHONE (OUTPATIENT)
Dept: NON INVASIVE DIAGNOSTICS | Age: 87
End: 2022-03-18

## 2022-03-18 NOTE — TELEPHONE ENCOUNTER
I called the patient. She was audibly short of breath. She told me her daughter, Maya Cole had gone to get her something to eat. I called Maya Cole. I explained to Maya Cole her mother was short of breath just in speaking with me. Maya Cole told me she is a nurse. When she first saw her mother she was okay. The longer Maya Cole was there the more anxious and worked up her mother became. I told Maya Cole I explained to her mother if she was having a lot of difficulty breathing then she needs to go to ED. Eunice Garcia was anxious. Saying she was afraid of Covid. I told Eunice Garcia is she is struggling to breathe, then ED is the place to be. I told Maya Cole this as well. Maya Cole said she will continue to evaluate her mother and go to ED if her mother is worse. I explained our internet is down. I cannot look at the SARIKA THAYER [facility] report. I will check it on Monday. Rayshawn verbalized understanding.     Mckenzie De La O RN, BSN  [facility]

## 2022-03-18 NOTE — TELEPHONE ENCOUNTER
Got a message from John Grigsby Nancy's daughter. She wants to make an appointment with Dr Ivan Duckworth because she is back in Afib. Her medication was increase last week the daughter stated but it is not working. John Grigsby states that Craig Easton is SOB and really does not want to go to the hospital. I advise that she send another remote transmission and I will advise the device clinic that it is coming over. John Grigsby was fine with that.

## 2022-03-21 ENCOUNTER — HOSPITAL ENCOUNTER (INPATIENT)
Age: 87
LOS: 5 days | Discharge: HOME | DRG: 309 | End: 2022-03-26
Attending: EMERGENCY MEDICINE | Admitting: FAMILY MEDICINE
Payer: MEDICARE

## 2022-03-21 ENCOUNTER — APPOINTMENT (OUTPATIENT)
Dept: GENERAL RADIOLOGY | Age: 87
DRG: 309 | End: 2022-03-21
Payer: MEDICARE

## 2022-03-21 DIAGNOSIS — I48.91 ATRIAL FIBRILLATION WITH RAPID VENTRICULAR RESPONSE (HCC): Primary | ICD-10-CM

## 2022-03-21 LAB
ALBUMIN SERPL-MCNC: 4.1 G/DL (ref 3.5–5.2)
ALP BLD-CCNC: 106 U/L (ref 35–104)
ALT SERPL-CCNC: 19 U/L (ref 0–32)
ANION GAP SERPL CALCULATED.3IONS-SCNC: 12 MMOL/L (ref 7–16)
AST SERPL-CCNC: 19 U/L (ref 0–31)
BASOPHILS ABSOLUTE: 0.07 E9/L (ref 0–0.2)
BASOPHILS RELATIVE PERCENT: 0.7 % (ref 0–2)
BILIRUB SERPL-MCNC: 0.6 MG/DL (ref 0–1.2)
BUN BLDV-MCNC: 26 MG/DL (ref 6–23)
CALCIUM SERPL-MCNC: 9.3 MG/DL (ref 8.6–10.2)
CHLORIDE BLD-SCNC: 99 MMOL/L (ref 98–107)
CO2: 26 MMOL/L (ref 22–29)
CREAT SERPL-MCNC: 1.6 MG/DL (ref 0.5–1)
EOSINOPHILS ABSOLUTE: 0.37 E9/L (ref 0.05–0.5)
EOSINOPHILS RELATIVE PERCENT: 3.5 % (ref 0–6)
GFR AFRICAN AMERICAN: 37
GFR NON-AFRICAN AMERICAN: 31 ML/MIN/1.73
GLUCOSE BLD-MCNC: 99 MG/DL (ref 74–99)
HCT VFR BLD CALC: 38.7 % (ref 34–48)
HEMOGLOBIN: 12.6 G/DL (ref 11.5–15.5)
IMMATURE GRANULOCYTES #: 0.07 E9/L
IMMATURE GRANULOCYTES %: 0.7 % (ref 0–5)
LYMPHOCYTES ABSOLUTE: 2.04 E9/L (ref 1.5–4)
LYMPHOCYTES RELATIVE PERCENT: 19.2 % (ref 20–42)
MCH RBC QN AUTO: 34.1 PG (ref 26–35)
MCHC RBC AUTO-ENTMCNC: 32.6 % (ref 32–34.5)
MCV RBC AUTO: 104.9 FL (ref 80–99.9)
MONOCYTES ABSOLUTE: 1.29 E9/L (ref 0.1–0.95)
MONOCYTES RELATIVE PERCENT: 12.2 % (ref 2–12)
NEUTROPHILS ABSOLUTE: 6.76 E9/L (ref 1.8–7.3)
NEUTROPHILS RELATIVE PERCENT: 63.7 % (ref 43–80)
PDW BLD-RTO: 13.9 FL (ref 11.5–15)
PLATELET # BLD: 245 E9/L (ref 130–450)
PMV BLD AUTO: 10.4 FL (ref 7–12)
POTASSIUM SERPL-SCNC: 4 MMOL/L (ref 3.5–5)
PRO-BNP: 7095 PG/ML (ref 0–450)
RBC # BLD: 3.69 E12/L (ref 3.5–5.5)
SODIUM BLD-SCNC: 137 MMOL/L (ref 132–146)
TOTAL PROTEIN: 7.4 G/DL (ref 6.4–8.3)
TROPONIN, HIGH SENSITIVITY: 17 NG/L (ref 0–9)
TROPONIN, HIGH SENSITIVITY: 22 NG/L (ref 0–9)
WBC # BLD: 10.6 E9/L (ref 4.5–11.5)

## 2022-03-21 PROCEDURE — 2140000000 HC CCU INTERMEDIATE R&B

## 2022-03-21 PROCEDURE — 6360000002 HC RX W HCPCS

## 2022-03-21 PROCEDURE — 84484 ASSAY OF TROPONIN QUANT: CPT

## 2022-03-21 PROCEDURE — 85025 COMPLETE CBC W/AUTO DIFF WBC: CPT

## 2022-03-21 PROCEDURE — 93005 ELECTROCARDIOGRAM TRACING: CPT | Performed by: PHYSICIAN ASSISTANT

## 2022-03-21 PROCEDURE — 83880 ASSAY OF NATRIURETIC PEPTIDE: CPT

## 2022-03-21 PROCEDURE — 2500000003 HC RX 250 WO HCPCS

## 2022-03-21 PROCEDURE — 36415 COLL VENOUS BLD VENIPUNCTURE: CPT

## 2022-03-21 PROCEDURE — 93005 ELECTROCARDIOGRAM TRACING: CPT | Performed by: EMERGENCY MEDICINE

## 2022-03-21 PROCEDURE — 80053 COMPREHEN METABOLIC PANEL: CPT

## 2022-03-21 PROCEDURE — 96365 THER/PROPH/DIAG IV INF INIT: CPT

## 2022-03-21 PROCEDURE — 96366 THER/PROPH/DIAG IV INF ADDON: CPT

## 2022-03-21 PROCEDURE — 96375 TX/PRO/DX INJ NEW DRUG ADDON: CPT

## 2022-03-21 PROCEDURE — 71046 X-RAY EXAM CHEST 2 VIEWS: CPT

## 2022-03-21 PROCEDURE — 99285 EMERGENCY DEPT VISIT HI MDM: CPT

## 2022-03-21 RX ORDER — SODIUM CHLORIDE 9 MG/ML
25 INJECTION, SOLUTION INTRAVENOUS PRN
Status: DISCONTINUED | OUTPATIENT
Start: 2022-03-21 | End: 2022-03-26 | Stop reason: HOSPADM

## 2022-03-21 RX ORDER — ETOMIDATE 2 MG/ML
10 INJECTION INTRAVENOUS ONCE
Status: DISCONTINUED | OUTPATIENT
Start: 2022-03-21 | End: 2022-03-21

## 2022-03-21 RX ORDER — BUDESONIDE AND FORMOTEROL FUMARATE DIHYDRATE 160; 4.5 UG/1; UG/1
2 AEROSOL RESPIRATORY (INHALATION) 2 TIMES DAILY
Status: DISCONTINUED | OUTPATIENT
Start: 2022-03-21 | End: 2022-03-21 | Stop reason: CLARIF

## 2022-03-21 RX ORDER — BUDESONIDE 0.5 MG/2ML
0.5 INHALANT ORAL 2 TIMES DAILY
Status: DISCONTINUED | OUTPATIENT
Start: 2022-03-21 | End: 2022-03-26 | Stop reason: HOSPADM

## 2022-03-21 RX ORDER — SODIUM CHLORIDE 0.9 % (FLUSH) 0.9 %
10 SYRINGE (ML) INJECTION PRN
Status: DISCONTINUED | OUTPATIENT
Start: 2022-03-21 | End: 2022-03-26 | Stop reason: HOSPADM

## 2022-03-21 RX ORDER — PROMETHAZINE HYDROCHLORIDE 25 MG/1
12.5 TABLET ORAL EVERY 6 HOURS PRN
Status: DISCONTINUED | OUTPATIENT
Start: 2022-03-21 | End: 2022-03-26 | Stop reason: HOSPADM

## 2022-03-21 RX ORDER — PRAVASTATIN SODIUM 20 MG
40 TABLET ORAL DAILY
Status: DISCONTINUED | OUTPATIENT
Start: 2022-03-22 | End: 2022-03-26 | Stop reason: HOSPADM

## 2022-03-21 RX ORDER — ETOMIDATE 2 MG/ML
INJECTION INTRAVENOUS
Status: DISCONTINUED
Start: 2022-03-21 | End: 2022-03-21

## 2022-03-21 RX ORDER — ONDANSETRON 2 MG/ML
4 INJECTION INTRAMUSCULAR; INTRAVENOUS EVERY 6 HOURS PRN
Status: DISCONTINUED | OUTPATIENT
Start: 2022-03-21 | End: 2022-03-22

## 2022-03-21 RX ORDER — FUROSEMIDE 20 MG/1
10 TABLET ORAL DAILY
Status: DISCONTINUED | OUTPATIENT
Start: 2022-03-22 | End: 2022-03-26 | Stop reason: HOSPADM

## 2022-03-21 RX ORDER — METOPROLOL TARTRATE 5 MG/5ML
5 INJECTION INTRAVENOUS ONCE
Status: COMPLETED | OUTPATIENT
Start: 2022-03-21 | End: 2022-03-21

## 2022-03-21 RX ORDER — SODIUM CHLORIDE 0.9 % (FLUSH) 0.9 %
10 SYRINGE (ML) INJECTION EVERY 12 HOURS SCHEDULED
Status: DISCONTINUED | OUTPATIENT
Start: 2022-03-21 | End: 2022-03-26 | Stop reason: HOSPADM

## 2022-03-21 RX ORDER — MAGNESIUM SULFATE IN WATER 40 MG/ML
2000 INJECTION, SOLUTION INTRAVENOUS ONCE
Status: COMPLETED | OUTPATIENT
Start: 2022-03-21 | End: 2022-03-21

## 2022-03-21 RX ORDER — POLYETHYLENE GLYCOL 3350 17 G/17G
17 POWDER, FOR SOLUTION ORAL DAILY PRN
Status: DISCONTINUED | OUTPATIENT
Start: 2022-03-21 | End: 2022-03-26 | Stop reason: HOSPADM

## 2022-03-21 RX ORDER — ARFORMOTEROL TARTRATE 15 UG/2ML
15 SOLUTION RESPIRATORY (INHALATION) 2 TIMES DAILY
Status: DISCONTINUED | OUTPATIENT
Start: 2022-03-21 | End: 2022-03-22

## 2022-03-21 RX ORDER — MAGNESIUM SULFATE IN WATER 40 MG/ML
2000 INJECTION, SOLUTION INTRAVENOUS ONCE
Status: DISCONTINUED | OUTPATIENT
Start: 2022-03-21 | End: 2022-03-26 | Stop reason: HOSPADM

## 2022-03-21 RX ORDER — METOPROLOL SUCCINATE 50 MG/1
100 TABLET, EXTENDED RELEASE ORAL 2 TIMES DAILY
Status: DISCONTINUED | OUTPATIENT
Start: 2022-03-22 | End: 2022-03-22

## 2022-03-21 RX ORDER — LEVOTHYROXINE SODIUM 112 UG/1
112 TABLET ORAL
Status: DISCONTINUED | OUTPATIENT
Start: 2022-03-22 | End: 2022-03-26 | Stop reason: HOSPADM

## 2022-03-21 RX ADMIN — METOPROLOL TARTRATE 5 MG: 5 INJECTION, SOLUTION INTRAVENOUS at 21:30

## 2022-03-21 RX ADMIN — MAGNESIUM SULFATE HEPTAHYDRATE 2000 MG: 40 INJECTION, SOLUTION INTRAVENOUS at 17:00

## 2022-03-21 ASSESSMENT — PAIN SCALES - GENERAL: PAINLEVEL_OUTOF10: 0

## 2022-03-21 NOTE — ED PROVIDER NOTES
Nikita Ross is a 81 y.o. female    Chief Complaint   Patient presents with    Atrial Fibrillation     w/ SOB starting this morning, pt daughter sent ppacemaker reading and it showed A. Fib. RVR    Shortness of Breath         HPI   Nikita Ross is a 81 y.o. female presenting to the ED for Atrial Fibrillation (w/ SOB starting this morning, pt daughter sent ppacemaker reading and it showed A. Fib. RVR) and Shortness of Breath    History comes primarily from the patient and Family. Patient presents to the emergency room for 2 weeks of tachycardia and shortness of breath. The patient has a pacemaker and sees Dr. Bill Mcdonald from electrophysiology. She also sees Dr. Pb Willard from cardiology. 10 days ago, the patient's daughter had a pacemaker report sent which showed atrial fibrillation with RVR per Dr. Bill Mcdonald. At that time, the patient had her Toprol increased from 100 mg twice daily to 125 twice daily. Patient has continued to be in a rapid ventricular rate between 100-150 heart rate. The patient's shortness of breath has continued to worsen and she states that even minimal activity gets her out of breath. The patient does walk with a cane or walker but cannot ambulate unassisted well if at all. The patient does have appointments scheduled with her cardiologist but cannot get seen until next month. She also has an appointment with her electrophysiologist but it is not for almost 2 months. Review of Systems   Constitutional: Negative for activity change, appetite change, fatigue and fever. HENT: Negative for congestion, rhinorrhea and sore throat. Eyes: Negative for redness and itching. Respiratory: Positive for shortness of breath. Negative for chest tightness and wheezing. Cardiovascular: Positive for palpitations. Negative for chest pain. Gastrointestinal: Negative for abdominal pain, constipation, diarrhea, nausea and vomiting.    Genitourinary: Negative for difficulty urinating, frequency and urgency. Musculoskeletal: Negative for arthralgias, back pain and myalgias. Skin: Negative for pallor and rash. Neurological: Negative for dizziness, weakness, numbness and headaches. Psychiatric/Behavioral: Negative for agitation, behavioral problems, confusion and decreased concentration. All other systems reviewed and are negative. Physical Exam  Vitals and nursing note reviewed. Constitutional:       General: She is not in acute distress. Appearance: Normal appearance. She is normal weight. She is not ill-appearing or toxic-appearing. HENT:      Head: Normocephalic and atraumatic. Right Ear: External ear normal.      Left Ear: External ear normal.      Nose: Nose normal. No congestion or rhinorrhea. Mouth/Throat:      Mouth: Mucous membranes are moist.      Pharynx: Oropharynx is clear. No oropharyngeal exudate. Eyes:      General: No scleral icterus. Extraocular Movements: Extraocular movements intact. Conjunctiva/sclera: Conjunctivae normal.      Pupils: Pupils are equal, round, and reactive to light. Cardiovascular:      Rate and Rhythm: Regular rhythm. Tachycardia present. Pulses: Normal pulses. Heart sounds: Normal heart sounds. No murmur heard. Pulmonary:      Effort: Pulmonary effort is normal. No respiratory distress. Breath sounds: Normal breath sounds. No wheezing. Abdominal:      General: Bowel sounds are normal. There is no distension. Palpations: Abdomen is soft. Tenderness: There is no abdominal tenderness. Musculoskeletal:         General: No swelling or deformity. Normal range of motion. Cervical back: Normal range of motion and neck supple. No rigidity. Skin:     General: Skin is warm and dry. Coloration: Skin is not jaundiced or pale. Neurological:      General: No focal deficit present. Mental Status: She is alert and oriented to person, place, and time.  Mental status is at baseline. Cranial Nerves: No cranial nerve deficit. Sensory: No sensory deficit. Motor: No weakness. Psychiatric:         Mood and Affect: Mood normal.         Behavior: Behavior normal.         Thought Content: Thought content normal.         Judgment: Judgment normal.          Procedures   Conscious Sedation Procedure Note    Indication: cardioversion    Consent: I have discussed with the patient and/or the patient representative the indication, alternatives, and the possible risks and/or complications of the planned procedure and the anesthesia methods. The patient and/or patient representative appear to understand and agree to proceed. Physician Involvement: The attending physician was present and supervising this procedure. Pre-Sedation Documentation and Exam:  Time: 2040  I have personally completed a history, physical exam & review of systems for this patient (see notes). Airway Assessment: normal    Prior History of Anesthesia Complications: none    ASA Classification: Class 2 - A normal healthy patient with mild systemic disease    Sedation/ Anesthesia Plan: intravenous sedation    Medications Used: etomidate intravenously    Monitoring and Safety: The patient was placed on a cardiac monitor and vital signs, pulse oximetry and level of consciousness were continuously evaluated throughout the procedure. The patient was closely monitored until recovery from the medications was complete and the patient had returned to baseline status. Respiratory therapy was on standby at all times during the procedure. (The following sections must be completed)  Post-Sedation Vital Signs: Vital signs were reviewed and were stable after the procedure (see flow sheet for vitals)            Post-Sedation Exam:   Time: 2110.    Lungs: clear to auscultation bilaterally and Cardiovascular: regular rate and rhythm           Complications: none      PROCEDURE  3/22/22       Time: 2045    CARDIOVERSION  Risks, benefits and alternatives (for applicable procedures below) described. Performed By: Ryan Santacruz MD and EM Attending Physician. Indication: atrial fibrillation. Informed consent: Written consent obtained. The patient was counseled regarding the procedure in person, it's indications, risks, potential complications and alternatives and any questions were answered. Consent was obtained. .  Procedure:  Vagal maneuvers were not attempted. Prior to the Procedure, anesthsia given: yes. Cardioversion was performed under my order and direction, and was attempted by synchronous mode, 1 times with 200 Joules. The resultant rhythm was:  conversion to normal sinus rhythm. Complications: None. Patient tolerated the procedure well. Cardiology Consult Placed:  yes. MDM   Patient presented to the Emergency Department for Atrial Fibrillation (w/ SOB starting this morning, pt daughter sent ppacemaker reading and it showed A. Fib. RVR) and Shortness of Breath    Patient presents to the emergency room for at least 2 weeks of atrial fibrillation with rapid ventricular rate associated with fatigue, weakness and shortness of breath. Patient's labs are significant for elevated BNP, chronically elevated creatinine at 1.6. Chest x-ray shows no acute process but I hiatal hernia and mild cardiomegaly. Patient's electrophysiologist is contacted. After discussing the case with him, he suggests cardioverting the patient due to her symptoms and persistent atrial fibrillation with RVR. Conscious sedation cardioversion performed by myself and the attending physician as above. Patient did revert to normal sinus rhythm for approximately 30 minutes. Afterwards the patient went back into atrial fibrillation. The patient was ultimately admitted to the hospital for further management.   Here in the emergency room the patient received 2000 mg of magnesium, dose of Lopressor 5 mg and Toprol 125.      EKG: This EKG is signed and interpreted by me. Rate: 121  Rhythm: Irregularly irregular  Axis: normal  Interpretation: atrial fibrillation (chronic)  Comparison: changes compared to previous EKG    Repeat EKG post cardioversion shows a rate of 75 and a by ventricularly paced rhythm and the patient appears to no longer be in atrial fibrillation.         --------------------------------------------- PAST HISTORY ---------------------------------------------  Past Medical History:  has a past medical history of Atrial fibrillation ([address] Heart palpitations, History of kidney problems, History of stress test, Hyperlipidemia, Hypertension, Hypothyroidism, and Vertigo. Past Surgical History:  has a past surgical history that includes Cholecystectomy; joint replacement (Right); Pacemaker insertion (05/04/2012); Spine surgery; transesophageal echocardiogram (06/23/2017); Coronary angioplasty with stent (Right, 06/26/2017); Upper gastrointestinal endoscopy; Colonoscopy; pacemaker placement (03/11/2019); and Cardioversion (06/13/2019). Social History:  reports that she has never smoked. She has never used smokeless tobacco. She reports that she does not drink alcohol and does not use drugs. Family History: family history is not on file. The patients home medications have been reviewed.     Allergies: Acetaminophen, Amiodarone, and Hydrocodone    -------------------------------------------------- RESULTS -------------------------------------------------    LABS:  Results for orders placed or performed during the hospital encounter of 03/21/22   CBC with Auto Differential   Result Value Ref Range    WBC 10.6 4.5 - 11.5 E9/L    RBC 3.69 3.50 - 5.50 E12/L    Hemoglobin 12.6 11.5 - 15.5 g/dL    Hematocrit 38.7 34.0 - 48.0 %    .9 (H) 80.0 - 99.9 fL    MCH 34.1 26.0 - 35.0 pg    MCHC 32.6 32.0 - 34.5 %    RDW 13.9 11.5 - 15.0 fL    Platelets 972 639 - 519 E9/L    MPV 10.4 7.0 - 12.0 fL Neutrophils % 63.7 43.0 - 80.0 %    Immature Granulocytes % 0.7 0.0 - 5.0 %    Lymphocytes % 19.2 (L) 20.0 - 42.0 %    Monocytes % 12.2 (H) 2.0 - 12.0 %    Eosinophils % 3.5 0.0 - 6.0 %    Basophils % 0.7 0.0 - 2.0 %    Neutrophils Absolute 6.76 1.80 - 7.30 E9/L    Immature Granulocytes # 0.07 E9/L    Lymphocytes Absolute 2.04 1.50 - 4.00 E9/L    Monocytes Absolute 1.29 (H) 0.10 - 0.95 E9/L    Eosinophils Absolute 0.37 0.05 - 0.50 E9/L    Basophils Absolute 0.07 0.00 - 0.20 E9/L   Comprehensive Metabolic Panel   Result Value Ref Range    Sodium 137 132 - 146 mmol/L    Potassium 4.0 3.5 - 5.0 mmol/L    Chloride 99 98 - 107 mmol/L    CO2 26 22 - 29 mmol/L    Anion Gap 12 7 - 16 mmol/L    Glucose 99 74 - 99 mg/dL    BUN 26 (H) 6 - 23 mg/dL    CREATININE 1.6 (H) 0.5 - 1.0 mg/dL    GFR Non-African American 31 >=60 mL/min/1.73    GFR African American 37     Calcium 9.3 8.6 - 10.2 mg/dL    Total Protein 7.4 6.4 - 8.3 g/dL    Albumin 4.1 3.5 - 5.2 g/dL    Total Bilirubin 0.6 0.0 - 1.2 mg/dL    Alkaline Phosphatase 106 (H) 35 - 104 U/L    ALT 19 0 - 32 U/L    AST 19 0 - 31 U/L   Troponin   Result Value Ref Range    Troponin, High Sensitivity 22 (H) 0 - 9 ng/L   Brain Natriuretic Peptide   Result Value Ref Range    Pro-BNP 7,095 (H) 0 - 450 pg/mL   Troponin   Result Value Ref Range    Troponin, High Sensitivity 17 (H) 0 - 9 ng/L       RADIOLOGY:  XR CHEST (2 VW)   Final Result   No acute process. Hiatal hernia. Mild cardiomegaly. ------------------------- NURSING NOTES AND VITALS REVIEWED ---------------------------  Date / Time Roomed:  3/21/2022  4:26 PM  ED Bed Assignment:  [id number]    The nursing notes within the ED encounter and vital signs as below have been reviewed.      Patient Vitals for the past 24 hrs:   BP Temp Temp src Pulse Resp SpO2 Height Weight   03/21/22 2305 126/68 96.2 °F (35.7 °C) Temporal 79 20 94 % 5' 5\" (1.651 m) 169 lb (76.7 kg)   03/21/22 2215 125/89 -- -- 83 18 95 % -- -- 03/21/22 2200 138/80 -- -- 90 25 100 % -- --   03/21/22 2145 134/87 -- -- 84 24 97 % -- --   03/21/22 2130 (!) 134/90 -- -- 95 19 97 % -- --   03/21/22 2115 (!) 132/91 -- -- 81 12 100 % -- --   03/21/22 2100 (!) 135/91 -- -- 79 19 100 % -- --   03/21/22 2050 123/88 -- -- 75 14 100 % -- --   03/21/22 2045 133/86 -- -- 110 20 -- -- --   03/21/22 2030 (!) 118/90 -- -- 116 18 100 % -- --   03/21/22 2029 (!) 118/90 98.2 °F (36.8 °C) Oral 116 30 98 % -- --   03/21/22 1945 -- -- -- 111 20 -- -- --   03/21/22 1930 112/86 -- -- 109 22 98 % -- --   03/21/22 1915 -- -- -- 100 27 -- -- --   03/21/22 1900 (!) 134/98 -- -- 109 23 98 % -- --   03/21/22 1845 -- -- -- 107 22 -- -- --   03/21/22 1830 (!) 126/96 -- -- 105 20 (!) 85 % -- --   03/21/22 1815 -- -- -- 106 22 94 % -- --   03/21/22 1800 121/85 -- -- 110 26 95 % -- --   03/21/22 1738 113/80 -- -- 100 -- -- -- --   03/21/22 1730 113/80 -- -- 110 28 91 % -- --   03/21/22 1647 (!) 115/90 -- -- 121 18 96 % -- --   03/21/22 1645 -- -- -- 121 -- -- -- --   03/21/22 1355 (!) 115/90 -- -- 130 -- -- 5' 5\" (1.651 m) 175 lb (79.4 kg)   03/21/22 1350 -- 97.4 °F (36.3 °C) -- 122 -- 95 % -- --       Oxygen Saturation Interpretation: Normal    ------------------------------------------ PROGRESS NOTES ------------------------------------------  Re-evaluation(s):  Time: Multiple reevaluations. Patients symptoms show no change  Repeat physical examination is not changed    Counseling:  I have spoken with the patient and discussed todays results, in addition to providing specific details for the plan of care and counseling regarding the diagnosis and prognosis. Their questions are answered at this time and they are agreeable with the plan of admission.    --------------------------------- ADDITIONAL PROVIDER NOTES ---------------------------------  Consultations:  Time: 2200. Spoke with Dr. Bill Sales. Discussed case. They will admit the patient.   This patient's ED course included: a personal history and physicial examination, multiple bedside re-evaluations, IV medications, cardiac monitoring, continuous pulse oximetry and complex medical decision making and emergency management    This patient has remained hemodynamically stable during their ED course. Diagnosis:  1. Atrial fibrillation with rapid ventricular response (HCC)        Disposition:  Patient's disposition: Admit to telemetry  Patient's condition is stable.            Majo Lopez MD  Resident  03/22/22 3996

## 2022-03-21 NOTE — ED NOTES
FIRST PROVIDER CONTACT ASSESSMENT NOTE           Department of Emergency Medicine                 First Provider Note            3/21/22  1:55 PM EDT    Date of Encounter: No admission date for patient encounter. Patient Name: Roberta Raymond  : 1935  MRN: [id number]    Chief Complaint: No chief complaint on file. History of Present Illness:   Roberta Raymond is a 81 y.o. female who presents to the ED for shortness of breath. Hx of a fib. On Eliquis. Reports worsening shortness of breath. States that she had her metoprolol increased 10 days ago. Focused Physical Exam:  VS:    ED Triage Vitals [22 1350]   BP Temp Temp src Pulse Resp SpO2 Height Weight   -- 97.4 °F (36.3 °C) -- 122 -- 95 % -- --        Physical Ex: Constitutional: Alert and non-toxic. Medical History:  has a past medical history of Atrial fibrillation ([address] Heart palpitations, History of kidney problems, History of stress test, Hyperlipidemia, Hypertension, Hypothyroidism, and Vertigo. Surgical History:  has a past surgical history that includes Cholecystectomy; joint replacement (Right); Pacemaker insertion (2012); Spine surgery; transesophageal echocardiogram (2017); Coronary angioplasty with stent (Right, 2017); Upper gastrointestinal endoscopy; Colonoscopy; pacemaker placement (2019); and Cardioversion (2019). Social History:  reports that she has never smoked. She has never used smokeless tobacco. She reports that she does not drink alcohol and does not use drugs. Family History: family history is not on file.     Allergies: Acetaminophen, Amiodarone, and Hydrocodone     Initial Plan of Care: Initiate Treatment-Testing, Proceed toTreatment Area When Bed Available for ED Attending/MLP to Continue Care      ---END OF FIRST PROVIDER CONTACT ASSESSMENT NOTE---  Electronically signed by CHIKA Coker   DD: 3/21/22       CHIKA Coker  [id number]

## 2022-03-22 ENCOUNTER — TELEPHONE (OUTPATIENT)
Dept: NON INVASIVE DIAGNOSTICS | Age: 87
End: 2022-03-22

## 2022-03-22 LAB
ANION GAP SERPL CALCULATED.3IONS-SCNC: 17 MMOL/L (ref 7–16)
BASOPHILS ABSOLUTE: 0.08 E9/L (ref 0–0.2)
BASOPHILS RELATIVE PERCENT: 0.9 % (ref 0–2)
BUN BLDV-MCNC: 23 MG/DL (ref 6–23)
CALCIUM SERPL-MCNC: 9 MG/DL (ref 8.6–10.2)
CHLORIDE BLD-SCNC: 103 MMOL/L (ref 98–107)
CO2: 20 MMOL/L (ref 22–29)
CREAT SERPL-MCNC: 1.4 MG/DL (ref 0.5–1)
EKG ATRIAL RATE: 108 BPM
EKG ATRIAL RATE: 75 BPM
EKG ATRIAL RATE: 84 BPM
EKG P AXIS: -16 DEGREES
EKG P AXIS: 33 DEGREES
EKG P-R INTERVAL: 186 MS
EKG P-R INTERVAL: 186 MS
EKG Q-T INTERVAL: 352 MS
EKG Q-T INTERVAL: 428 MS
EKG Q-T INTERVAL: 458 MS
EKG QRS DURATION: 116 MS
EKG QRS DURATION: 94 MS
EKG QRS DURATION: 96 MS
EKG QTC CALCULATION (BAZETT): 477 MS
EKG QTC CALCULATION (BAZETT): 499 MS
EKG QTC CALCULATION (BAZETT): 541 MS
EKG R AXIS: 105 DEGREES
EKG R AXIS: 136 DEGREES
EKG R AXIS: 2 DEGREES
EKG T AXIS: -14 DEGREES
EKG T AXIS: -4 DEGREES
EKG T AXIS: 0 DEGREES
EKG VENTRICULAR RATE: 121 BPM
EKG VENTRICULAR RATE: 75 BPM
EKG VENTRICULAR RATE: 84 BPM
EOSINOPHILS ABSOLUTE: 0.5 E9/L (ref 0.05–0.5)
EOSINOPHILS RELATIVE PERCENT: 5.5 % (ref 0–6)
GFR AFRICAN AMERICAN: 43
GFR NON-AFRICAN AMERICAN: 36 ML/MIN/1.73
GLUCOSE BLD-MCNC: 81 MG/DL (ref 74–99)
HCT VFR BLD CALC: 38.2 % (ref 34–48)
HEMOGLOBIN: 12.5 G/DL (ref 11.5–15.5)
IMMATURE GRANULOCYTES #: 0.07 E9/L
IMMATURE GRANULOCYTES %: 0.8 % (ref 0–5)
LYMPHOCYTES ABSOLUTE: 2.43 E9/L (ref 1.5–4)
LYMPHOCYTES RELATIVE PERCENT: 26.8 % (ref 20–42)
MAGNESIUM: 2.9 MG/DL (ref 1.6–2.6)
MCH RBC QN AUTO: 34.6 PG (ref 26–35)
MCHC RBC AUTO-ENTMCNC: 32.7 % (ref 32–34.5)
MCV RBC AUTO: 105.8 FL (ref 80–99.9)
MONOCYTES ABSOLUTE: 1.02 E9/L (ref 0.1–0.95)
MONOCYTES RELATIVE PERCENT: 11.2 % (ref 2–12)
NEUTROPHILS ABSOLUTE: 4.97 E9/L (ref 1.8–7.3)
NEUTROPHILS RELATIVE PERCENT: 54.8 % (ref 43–80)
PDW BLD-RTO: 13.9 FL (ref 11.5–15)
PLATELET # BLD: 204 E9/L (ref 130–450)
PMV BLD AUTO: 10.8 FL (ref 7–12)
POTASSIUM REFLEX MAGNESIUM: 3.6 MMOL/L (ref 3.5–5)
RBC # BLD: 3.61 E12/L (ref 3.5–5.5)
SODIUM BLD-SCNC: 140 MMOL/L (ref 132–146)
WBC # BLD: 9.1 E9/L (ref 4.5–11.5)

## 2022-03-22 PROCEDURE — 94640 AIRWAY INHALATION TREATMENT: CPT

## 2022-03-22 PROCEDURE — 5A2204Z RESTORATION OF CARDIAC RHYTHM, SINGLE: ICD-10-PCS | Performed by: STUDENT IN AN ORGANIZED HEALTH CARE EDUCATION/TRAINING PROGRAM

## 2022-03-22 PROCEDURE — 93010 ELECTROCARDIOGRAM REPORT: CPT | Performed by: INTERNAL MEDICINE

## 2022-03-22 PROCEDURE — 6360000002 HC RX W HCPCS: Performed by: FAMILY MEDICINE

## 2022-03-22 PROCEDURE — 94664 DEMO&/EVAL PT USE INHALER: CPT

## 2022-03-22 PROCEDURE — 6370000000 HC RX 637 (ALT 250 FOR IP): Performed by: NURSE PRACTITIONER

## 2022-03-22 PROCEDURE — 83735 ASSAY OF MAGNESIUM: CPT

## 2022-03-22 PROCEDURE — 85025 COMPLETE CBC W/AUTO DIFF WBC: CPT

## 2022-03-22 PROCEDURE — 6370000000 HC RX 637 (ALT 250 FOR IP): Performed by: FAMILY MEDICINE

## 2022-03-22 PROCEDURE — 93005 ELECTROCARDIOGRAM TRACING: CPT | Performed by: STUDENT IN AN ORGANIZED HEALTH CARE EDUCATION/TRAINING PROGRAM

## 2022-03-22 PROCEDURE — 80048 BASIC METABOLIC PNL TOTAL CA: CPT

## 2022-03-22 PROCEDURE — 99223 1ST HOSP IP/OBS HIGH 75: CPT | Performed by: STUDENT IN AN ORGANIZED HEALTH CARE EDUCATION/TRAINING PROGRAM

## 2022-03-22 PROCEDURE — 2580000003 HC RX 258: Performed by: FAMILY MEDICINE

## 2022-03-22 PROCEDURE — 2140000000 HC CCU INTERMEDIATE R&B

## 2022-03-22 PROCEDURE — 36415 COLL VENOUS BLD VENIPUNCTURE: CPT

## 2022-03-22 RX ORDER — METOPROLOL SUCCINATE 50 MG/1
75 TABLET, EXTENDED RELEASE ORAL 2 TIMES DAILY
Status: DISCONTINUED | OUTPATIENT
Start: 2022-03-22 | End: 2022-03-26 | Stop reason: HOSPADM

## 2022-03-22 RX ORDER — DILTIAZEM HYDROCHLORIDE 120 MG/1
120 CAPSULE, COATED, EXTENDED RELEASE ORAL DAILY
Status: DISCONTINUED | OUTPATIENT
Start: 2022-03-22 | End: 2022-03-26 | Stop reason: HOSPADM

## 2022-03-22 RX ADMIN — APIXABAN 2.5 MG: 2.5 TABLET, FILM COATED ORAL at 08:50

## 2022-03-22 RX ADMIN — PRAVASTATIN SODIUM 40 MG: 20 TABLET ORAL at 08:50

## 2022-03-22 RX ADMIN — APIXABAN 2.5 MG: 2.5 TABLET, FILM COATED ORAL at 01:12

## 2022-03-22 RX ADMIN — SODIUM CHLORIDE, PRESERVATIVE FREE 10 ML: 5 INJECTION INTRAVENOUS at 08:50

## 2022-03-22 RX ADMIN — DILTIAZEM HYDROCHLORIDE 120 MG: 120 CAPSULE, COATED, EXTENDED RELEASE ORAL at 18:14

## 2022-03-22 RX ADMIN — METOPROLOL SUCCINATE 75 MG: 50 TABLET, EXTENDED RELEASE ORAL at 21:25

## 2022-03-22 RX ADMIN — SODIUM CHLORIDE, PRESERVATIVE FREE 10 ML: 5 INJECTION INTRAVENOUS at 01:11

## 2022-03-22 RX ADMIN — METOPROLOL SUCCINATE 125 MG: 100 TABLET, EXTENDED RELEASE ORAL at 01:12

## 2022-03-22 RX ADMIN — BUDESONIDE 500 MCG: 0.5 SUSPENSION RESPIRATORY (INHALATION) at 07:33

## 2022-03-22 RX ADMIN — METOPROLOL SUCCINATE 100 MG: 50 TABLET, EXTENDED RELEASE ORAL at 08:50

## 2022-03-22 RX ADMIN — ARFORMOTEROL TARTRATE 15 MCG: 15 SOLUTION RESPIRATORY (INHALATION) at 07:32

## 2022-03-22 RX ADMIN — SODIUM CHLORIDE, PRESERVATIVE FREE 10 ML: 5 INJECTION INTRAVENOUS at 21:26

## 2022-03-22 RX ADMIN — LEVOTHYROXINE SODIUM 112 MCG: 0.11 TABLET ORAL at 06:56

## 2022-03-22 RX ADMIN — BUDESONIDE 500 MCG: 0.5 SUSPENSION RESPIRATORY (INHALATION) at 20:23

## 2022-03-22 RX ADMIN — FUROSEMIDE 10 MG: 20 TABLET ORAL at 12:37

## 2022-03-22 RX ADMIN — APIXABAN 2.5 MG: 2.5 TABLET, FILM COATED ORAL at 21:26

## 2022-03-22 ASSESSMENT — ENCOUNTER SYMPTOMS
SORE THROAT: 0
CHEST TIGHTNESS: 0
VOMITING: 0
WHEEZING: 0
EYE REDNESS: 0
BACK PAIN: 0
EYE ITCHING: 0
ABDOMINAL PAIN: 0
RHINORRHEA: 0
DIARRHEA: 0
CONSTIPATION: 0
SHORTNESS OF BREATH: 1
NAUSEA: 0

## 2022-03-22 ASSESSMENT — PAIN SCALES - GENERAL
PAINLEVEL_OUTOF10: 0

## 2022-03-22 ASSESSMENT — PULMONARY FUNCTION TESTS: PEFR_L/MIN: 80

## 2022-03-22 NOTE — TELEPHONE ENCOUNTER
----- Message from Tania Hamilton MD sent at 3/21/2022  4:29 PM EDT -----  Please schedule DC-cardioversion and Amiodarone initiation if the patient and family agree. Thanks.

## 2022-03-22 NOTE — ED NOTES
Report given to [facility]. Patient SBAR faxed. Patient placed in transport. [facility] Nursing support called daughter to notify patients bed placement. Patients due medication not received from pharmacy, nurse aware.       Tobi Fonseca RN  03/21/22 4221

## 2022-03-22 NOTE — CONSULTS
[facility] [address] DEPARTMENT/DIVISION OF CARDIOLOGY  Inpatient consultation Report  PATIENT: Alyse Aguilar  MEDICAL RECORD NUMBER: [id number]  DATE OF SERVICE:  3/22/2022  ATTENDING ELECTROPHYSIOLOGIST:  Lesa Jackson DO   REFERRING PHYSICIAN: No ref. provider Madan Barksdale  CHIEF COMPLAINT: Atrial fibrillation    HPI: Alyse Aguilar is a 81 y.o. female with a history of nonvalvular persistent AF sp DCCV (6/13/2019 and 3/21/2022), HFpEF-recovered/nonischemic sp [place] CRT-P (dual-chamber PPM DOI: 5/4/2012; CRT-P upgrade: 3/11/2019), CAD sp stent RCA (2017), HTN, CKD 3/4, spinal stenosis, and hypothyroidism. She is managed by Maia Gonzalez and Tom Gtz with apixaban 2.5 mg twice daily, Lasix 10 mg daily, metoprolol  mg twice daily, and Pravachol 40 mg daily. In 2012, she was reportedly diagnosed with intermittent AV block and treated with a [employer] [facility] dual-chamber pacemaker. She was diagnosed with atrial fibrillation at some point prior to 2013. She reportedly was intolerant to amiodarone in the past due to elevated LFTs. However the details of this are unavailable to me. In 2019, she transferred EP care from outside hospital to [address] [place] [address], Dr Tom Gtz. At that time she was noted to have LVEF of 45% with RV pacing greater than 40%, which was treated with upgrade of dual-chamber pacemaker to CRT P. In June 2019, she had a recurrence of AF around this time and was treated with direct-current cardioversion and sotalol 120 mg daily. In November 2019, patient discontinued sotalol on her own. In the past, discussion of AVJ ablation was had between establish EP and patient/family and if she were to develop increased AF/AT burden with reduction in BiV pacing. She is enrolled in remote monitoring which is reported reduction in BiV pacing to 44% and in AF burden of approximately 50 to 60% with episodes of RVR.   This was treated with titration of BIV PPM / NEW LV LEAD   ([facility])   DR. Diggs [place]     SPINE SURGERY      lumbar lameinectomy with screw stablization    TRANSESOPHAGEAL ECHOCARDIOGRAM  06/23/2017    Dr. Danielle Chambers SANGITA     UPPER GASTROINTESTINAL ENDOSCOPY        History reviewed. No pertinent family history.   There is no family history of sudden cardiac arrest    Social History     Tobacco Use    Smoking status: Never Smoker    Smokeless tobacco: Never Used   Substance Use Topics    Alcohol use: No       Current Facility-Administered Medications   Medication Dose Route Frequency Provider Last Rate Last Admin    magnesium sulfate 2000 mg in 50 mL IVPB premix  2,000 mg IntraVENous Once Kacy Levin, DO        apixaban Magnolia Galla) tablet 2.5 mg  2.5 mg Oral BID Kacy Levin, DO   2.5 mg at 03/22/22 0112    furosemide (LASIX) tablet 10 mg  10 mg Oral Daily Kacy Levin, DO        levothyroxine (SYNTHROID) tablet 112 mcg  112 mcg Oral QAM AC Kacy Levin, DO   112 mcg at 03/22/22 2650    metoprolol succinate (TOPROL XL) extended release tablet 100 mg  100 mg Oral BID Kacy Levin, DO        pravastatin (PRAVACHOL) tablet 40 mg  40 mg Oral Daily Kacy Levin, DO        sodium chloride flush 0.9 % injection 10 mL  10 mL IntraVENous 2 times per day Kacy Levin, DO   10 mL at 03/22/22 0111    sodium chloride flush 0.9 % injection 10 mL  10 mL IntraVENous PRN Kacy Levin, DO        0.9 % sodium chloride infusion  25 mL IntraVENous PRN Kacy Levin, DO        promethazine (PHENERGAN) tablet 12.5 mg  12.5 mg Oral Q6H PRN Kacy Levin, DO        Or    ondansetron [facility] injection 4 mg  4 mg IntraVENous Q6H PRN Kacy Printaishwarya, DO        polyethylene glycol (GLYCOLAX) packet 17 g  17 g Oral Daily PRN Kacy Levin, DO        budesonide (PULMICORT) nebulizer suspension 500 mcg  0.5 mg Nebulization BID Kacy Levin, DO   500 mcg at 03/22/22 0240    And    Arformoterol Tartrate (BROVANA) nebulizer solution 15 mcg  15 mcg Nebulization BID [place] Curling, DO   15 mcg at 03/22/22 0732        Allergies   Allergen Reactions    Acetaminophen      Other reaction(s): UPSET STOMACH AND NAUSEA    Amiodarone Other (See Comments)     Causes issues with liver enzymes.  Hydrocodone      Other reaction(s): UPSET STOMACH AND NAUSEA       ROS:   Constitutional: Negative for fever, activity change and appetite change. HENT: Negative for epistaxis. Eyes: Negative for diploplia, blurred vision. Respiratory: Negative for cough, chest tightness, shortness of breath and wheezing. Cardiovascular: pertinent positives in HPI  Gastrointestinal: Negative for abdominal pain and blood in stool. Genitourinary: Negative for hematuria and difficulty urinating. Musculoskeletal: Negative for myalgias and gait problem. Skin: Negative for color change and rash. Neurological: Negative for syncope and light-headedness. Psychiatric/Behavioral: Negative for confusion and agitation. The patient is not nervous/anxious. Heme: no bleeding disorders, no melena or hematochezia  All other review of systems are negative     PHYSICAL EXAM:  Vitals:    03/21/22 2215 03/21/22 2305 03/22/22 0530 03/22/22 0735   BP: 125/89 126/68 131/73    Pulse: 83 79 74    Resp: 18 20 18    Temp:  96.2 °F (35.7 °C) 96.5 °F (35.8 °C)    TempSrc:  Temporal Temporal    SpO2: 95% 94% 100% 100%   Weight:  169 lb (76.7 kg)     Height:  5' 5\" (1.651 m)     Constitutional: Well-developed, no acute distress, well groomed  Eyes: conjunctivae normal, no xanthelasma   Ears, Nose, Throat: oral mucosa moist, no cyanosis   Neck: supple, no JVD   CV: IRIR, peripheral pulses normal with bilateral radial and pedal pulses   Lungs: normal respiratory effort without used of accessory muscles, no audible wheezes  Abdomen: nondistended  Extremities: no digital clubbing, no edema   Skin: warm, no rashes, CI ED site clean dry intact without erythema edema or drainage.   Neuro/Psych: A&O x 3, normal mood and affect    Data:    Recent Labs     03/21/22  1642 03/22/22  0520   WBC 10.6 9.1   HGB 12.6 12.5   HCT 38.7 38.2    204     Recent Labs     03/21/22  1642 03/22/22  0519    140   K 4.0 3.6   CL 99 103   CO2 26 20*   BUN 26* 23   CREATININE 1.6* 1.4*   CALCIUM 9.3 9.0     No results for input(s): INR in the last 72 hours. No results for input(s): TSH in the last 72 hours. Lab Results   Component Value Date    MG 2.1 06/13/2019     Telemetry: AP- with paroxysms of AF/AFL. Device Interrogation/Reprogramming (3/22/22)  · Make/Model: [employer] Neal Allure RF  · DOI: 5/11/19  · Battery: 6.5-7 years  · Jennifer Boss therapy: DDDR 75 - 120 bpm, AV delays = 150 msec (sensed), 180 msec (paced)  · Pacing %: RA = 15%, BiV = 22%  · Lead function:  · RA lead: sensing = 2.6 mV, impedance = 430 ohms, threshold = 0.75 V @ 0.5 msec  · RV lead: sensing = 2.3 mV, impedance = 410 ohms, threshold = 1.25 V @ 0.5 msec  · LV lead: sensing = N/A, impedance = 780 ohms, threshold = 1.0 V @ 0.5 msec  · Lead programming:  · RA lead: sensitivity = 0.1 mV, output = 2.5 V @ 0.5 msec  · RV lead: sensitivity = 1.0 mV, output = 2.375 V @ 0.5 msec  · LV lead: sensitivity = N/A, output = 2.0 V @ 0.5 msec  · Arrhythmias: AT/AF burden = 33% since 3/18/22 with episodes of RVR. · Reprogramming included: none  · Overall device function is normal  · All device programmable settings were evaluated per above and in the scanned document, along with iterative adjustments (capture thresholds) to assess and select the most appropriate final programming to provide for consistent delivery of the appropriate therapy and to verify function of the device. Assessment/plan:  1. nonvalvular persistent AF sp DCCV (6/13/2019 and 3/21/2022)  -Initially diagnosed in 2013 or sometime prior to this.  -Chads vas score = 6 (age, sex, HF, CAD, HTN). Recommend [address] in females with score greater than or equal to 2.   DOAC preferred.  -Continue apixaban 2.5 mg twice daily given her age and renal dysfunction. She needs annual CBC and CMP. -Rhythm control preferred in the setting of CRT and HF. She was reportedly intolerant to amiodarone in the past due to LFT elevation, but this was prior to transferring care to [facility] and records are unavailable to me. In 2019, she was treated with sotalol direct-current cardioversion. Approximately 5 months after starting sotalol she discontinued on her own for unclear reasons. Since that time she has been monitored via pacemaker. Recently she is noted to have increased AT/AF burden with episodes of RVR inhibiting CRT pacing. A direct-current cardioversion in the ED reportedly succeeded in restoring sinus rhythm, but this lasted for only brief period of time prior to recurrence of AF. Her creatinine today is 1.4. Her creatinine clearance is 35. I discussed options of 3-day admit for Tikosyn 125 mcg twice daily with possible DCCV, AF/AFL ablation with possible need for antiarrhythmic therapy as well, or AVJ ablation and reprogramming to VVIR. After review of indications, material risks, benefits of each option with patient and daughter, they desire tikosyn 125 mcg BID with 3 day in-hospital telemetry monitoring. However, patient's ECG today has QTc of 521 msec with paced rhythm, although this in the setting of multiple QTc prolonging medications. Therefore, recommend stop QTc prolonging medications and reassess QTc in AM. If QTc remains > 500 msec in setting of paced rhythm, then would need to re-consider alternative options.  -Patient also desires reduction in metoprolol and to restart diltiazem because she \"felt better\" on diltiazem in comparison to higher BB dose. -Modifiable risk factors:  --Obesity: BMI goal less than 27.  --VALE: Consider outpatient sleep study.  --HTN: BP goal less than 130/80  --Alcohol: Recommend avoid    2.   HFpEF-recovered/nonischemic sp [facility] CRT-P (dual-chamber PPM DOI: 5/4/2012; CRT-P upgrade: 3/11/2019)  -In 2019, she transferred EP care from outside hospital to [facility], Dr Fazal cMarthur. At that time she was noted to have LVEF of 45% with RV pacing greater than 40%, which was treated with upgrade of dual-chamber pacemaker to CRT P.  -I could find no record of cardiac testing with reported LVEF reduction. However her most recent assessment in 2020 reported LVEF =79%. -PA and lateral chest x-ray: LV lead in mid, posterior lateral location. -AF interfering with CRT pacing. See #1    3. CAD sp stent RCA (2017)  -Managed by Dr. Charlie Contreras. 4.  Intermittent AV block  -Reported in the past as the initial indication for dual-chamber pacemaker in 2012. I have spent a total of 60 minutes with the patient and his/her family reviewing the above stated recommendations. And a total of >50% of that time involved face-to-face time providing counseling and or coordination of care with the other providers. Thank you for allowing me to participate in your patient's care. Please call me if there are any questions.       Fely Alvarez, DO   Cardiac Electrophysiology  [facility]  [facility]) Physicians

## 2022-03-22 NOTE — H&P
Hospitalist History & Physical      PCP: Joshua Morales    Date of Service: Pt seen/examined on 3/21/2022     Chief Complaint:  had concerns including Atrial Fibrillation (w/ SOB starting this morning, pt daughter sent ppacemaker reading and it showed A. Fib. RVR) and Shortness of Breath. History Of Present Illness:    Ms. Roberta Raymond, a 81y.o. year old female  who  has a past medical history of Atrial fibrillation ([address] Heart palpitations, History of kidney problems, History of stress test, Hyperlipidemia, Hypertension, Hypothyroidism, and Vertigo. Patient presents emergency department with complaints of shortness of breath and tachycardia. Patient has a history of atrial fibrillation and a pacemaker. Pacemaker was interrogated 10 days ago and revealed atrial fibrillation with RVR. Patient had beta-blocker increased at that time but symptoms persisted. Patient reporting heart rate in 150s. Also with shortness of breath. Cardiology was consulted in the emergency department and they advised cardioversion. This was performed and worked temporarily but ultimately patient went back into atrial fibrillation with RVR. She will be admitted for further evaluation and treatment.       Past Medical History:   Diagnosis Date    Atrial fibrillation ([address]     Heart palpitations     History of kidney problems     History of stress test     Hyperlipidemia     Hypertension     Hypothyroidism     Vertigo        Past Surgical History:   Procedure Laterality Date    CARDIOVERSION  06/13/2019    Dr. Nafisa Russo Right 06/26/2017    Dr Misty Naidu Right     knee    PACEMAKER INSERTION  05/04/2012    D-PPM  Dr. Yanez Lat  03/11/2019    UPGRADE D-PPM TO BIV PPM / NEW LV LEAD   (Jass Henao)   DR. Fabio Sweeney     SPINE SURGERY      lumbar lameinectomy with screw stablization    TRANSESOPHAGEAL ECHOCARDIOGRAM  06/23/2017    Dr. Elliott Chisholm SANGITA     [address]         Prior to Admission medications    Medication Sig Start Date End Date Taking? Authorizing Provider   metoprolol succinate (TOPROL XL) 25 MG extended release tablet Take 1 tablet by mouth 2 times daily Indications: Take with the 100 mg tablets to make 125 mg bid  Patient not taking: Reported on 3/21/2022 3/10/22   Nick Schofield MD   ELIQUIS 2.5 MG TABS tablet TAKE ONE TABLET BY MOUTH TWO TIMES A DAY 1/10/22   Nick Schofield MD   metoprolol succinate (TOPROL XL) 100 MG extended release tablet Take 100 mg by mouth 2 times daily  Patient not taking: Reported on 3/21/2022    Historical Provider, MD   pravastatin (PRAVACHOL) 40 MG tablet TAKE 1 TABLET BY MOUTH DAILY 9/8/21   Jesús Mail, DO   ADVAIR DISKUS 250-50 MCG/DOSE AEPB INHALE ONE PUFF BY MOUTH TWO TIMES A DAY 8/11/21   Historical Provider, MD   furosemide (LASIX) 20 MG tablet TAKE TWO TABLETS BY MOUTH DAILY  Patient taking differently: Take 10 mg by mouth daily  3/1/21   Jesús Mail, DO   levothyroxine (SYNTHROID) 112 MCG tablet Take 1 tablet by mouth every morning (before breakfast) 9/2/20   Historical Provider, MD   LORazepam (ATIVAN) 0.5 MG tablet TAKE ONE TABLET BY MOUTH DAILY AS NEEDED 1/10/19   Historical Provider, MD   Cholecalciferol (VITAMIN D PO) Take 4,000 Units by mouth daily     Historical Provider, MD         Allergies:  Acetaminophen, Amiodarone, and Hydrocodone    Social History:    TOBACCO:   reports that she has never smoked. She has never used smokeless tobacco.  ETOH:   reports no history of alcohol use. Family History:    Reviewed in detail and negative for DM, CAD, Cancer, CVA. Positive as follows\"  History reviewed. No pertinent family history. REVIEW OF SYSTEMS:   Pertinent positives as noted in the HPI. All other systems reviewed and negative.     PHYSICAL EXAM:  /88   Pulse 75   Temp 98.2 °F (36.8 °C) (Oral)   Resp 14   Ht 5' 5\" (1.651 m)   Wt 175 lb (79.4 kg)   SpO2 100%   BMI 29.12 kg/m²   General appearance: No apparent distress, appears stated age and cooperative. HEENT: Normal cephalic, atraumatic without obvious deformity. Pupils equal, round, and reactive to light. Extra ocular muscles intact. Conjunctivae/corneas clear. Neck: Supple, with full range of motion. No jugular venous distention. Trachea midline. Respiratory: Clear to auscultation bilaterally  Cardiovascular: Irregularly irregular  Abdomen: Soft, nontender, nondistended  Musculoskeletal: No clubbing, cyanosis, edema of bilateral lower extremities. Brisk capillary refill. Skin: Normal skin color. No rashes or lesions. Neurologic:  Neurovascularly intact without any focal sensory/motor deficits. Cranial nerves: II-XII intact, grossly non-focal.  Psychiatric: Alert and oriented, thought content appropriate, normal insight    Reviewed EKG and CXR personally      CBC:   Recent Labs     03/21/22  1642   WBC 10.6   RBC 3.69   HGB 12.6   HCT 38.7   .9*   RDW 13.9        BMP:   Recent Labs     03/21/22  1642      K 4.0   CL 99   CO2 26   BUN 26*   CREATININE 1.6*     LFT:  Recent Labs     03/21/22  1642   PROT 7.4   ALKPHOS 106*   ALT 19   AST 19   BILITOT 0.6     CE:  No results for input(s): Shannon Hatteras in the last 72 hours. PT/INR: No results for input(s): INR, APTT in the last 72 hours. BNP: No results for input(s): BNP in the last 72 hours.   ESR:   Lab Results   Component Value Date    SEDRATE 56 (H) 03/30/2015     CRP: No results found for: CRP  D Dimer: No results found for: DDIMER   Folate and B12:   Lab Results   Component Value Date    KZNIOZJC20 518 03/30/2015   ,   Lab Results   Component Value Date    FOLATE 7.7 03/30/2015     Lactic Acid:   Lab Results   Component Value Date    LACTA 1.1 06/03/2017     Thyroid Studies:   Lab Results   Component Value Date    TSH 0.629 06/10/2019       Oupatient labs:  Lab Results   Component Value Date    CHOL 151 06/03/2017    TRIG 79 06/03/2017    HDL 74 06/03/2017    LDLCALC 61 06/03/2017    TSH 0.629 06/10/2019    INR 1.4 06/22/2017       Urinalysis:    Lab Results   Component Value Date    NITRU Negative 03/17/2015    WBCUA 0-1 03/17/2015    BACTERIA FEW 03/17/2015    RBCUA 2-5 03/17/2015    BLOODU TRACE 03/17/2015    SPECGRAV 1.020 03/17/2015    GLUCOSEU Negative 03/17/2015       Imaging:  XR CHEST (2 VW)    Result Date: 3/21/2022  EXAMINATION: TWO XRAY VIEWS OF THE CHEST 3/21/2022 3:06 pm COMPARISON: None. HISTORY: ORDERING SYSTEM PROVIDED HISTORY: shortness of breath TECHNOLOGIST PROVIDED HISTORY: Reason for exam:->shortness of breath What reading provider will be dictating this exam?->CRC FINDINGS: The lungs are without acute focal process. There is no effusion or pneumothorax. The cardiomediastinal silhouette is without acute process. The osseous structures are without acute process. There is kyphosis of the thoracic spine. Hiatal hernia noted. Pacemaker present. No acute process. Hiatal hernia. Mild cardiomegaly.        ASSESSMENT:  -Atrial fibrillation with RVR  -Hypertension  -Hyperlipidemia  -Hypothyroidism  -CKD stage III      PLAN:  -Admit to medicine  -Consult electrophysiology  -Telemetry  -N.p.o. after midnight  -Continue home medications        Diet: No diet orders on file  Code Status: Prior  Surrogate decision maker confirmed with patient:   Extended Emergency Contact Information  Primary Emergency Contact: [address] [facility] of [address] [place] [address] Phone: [phone]  Work Phone: [phone]  Relation: Child  Secondary Emergency Contact: [address] Phone: [phone]  Relation: Grandchild    DVT Prophylaxis: []Lovenox []Heparin []PCD [] [address]  []Encouraged ambulation  Disposition: []Med/Surg [] Intermediate [] ICU/CCU  Admit status: [] Observation [] Inpatient     +++++++++++++++++++++++++++++++++++++++++++++++++  Herman Kearns

## 2022-03-22 NOTE — PROGRESS NOTES
Hospitalist Progress Note      SYNOPSIS: Patient admitted on 3/21/2022 for Atrial fibrillation with RVR [facility])    Chief complaint=  Patient confirms chief complaint for this hospital admission stating she felt she could not breathe  SUBJECTIVE:    Patient seen and examined  Records reviewed. Review of systems  General= denies fever  Pulmonary= as per HPI  Cardiovascular= + afib  GI= denies diarrhea  Neurologic= patient denies new symptoms of paresthesias or weakness or speech disturbance      Temp (24hrs), Av °F (36.1 °C), Min:96.2 °F (35.7 °C), Max:98.2 °F (36.8 °C)    DIET: ADULT DIET; Regular  CODE: Full Code    Intake/Output Summary (Last 24 hours) at 3/22/2022 1337  Last data filed at 3/22/2022 0530  Gross per 24 hour   Intake 0 ml   Output --   Net 0 ml       OBJECTIVE: 100% SPO2    /69   Pulse 76   Temp 96.6 °F (35.9 °C)   Resp 16   Ht 5' 5\" (1.651 m)   Wt 169 lb (76.7 kg)   SpO2 100%   BMI 28.12 kg/m²     General appearance: Elderly not in extremis   HEENT:   There is no thrush tongue is not smooth   neck: Trachea in the midline no thyroid enlargement is visible or palpable  Respiratory: Clear sounds to auscultation  Cardiovascular: Pacemaker device is noted left pectoral region no murmur  Abdomen:  Bowel sounds present without rigidity  Musculoskeletal: No clubbing no palmar erythema no swelling of the palms   Skin: No petechiae  Neurologic: Awake and alert  Mood and affect and thinking are normal  Language intact  Patient can tell me her full name as well as today's date and current location  No noticeable tremor in her upper extremities  No dysarthria or aphasia    ASSESSMENT:    A. fib   Pacemaker  Hypothyroid unspecified  Chronic kidney disease stage III  Hyperlipidemia unspecified  Chronic COPD-currently not in exacerbation  Copd class/Stage unclear/  Normal low potassium level     PLAN:    Check TSH  Await EP eval  Continue current dose of levothyroxine  Avoid nephrotoxic agents  Monitor serum potassium  Check magnesium level  Continue pravastatin for hyperlipidemia  Continue arformoterol and budesonide for COPD  DISPOSITION: Patient lives at home    Medications:  REVIEWED DAILY    Infusion Medications    sodium chloride       Scheduled Medications    magnesium sulfate  2,000 mg IntraVENous Once    apixaban  2.5 mg Oral BID    furosemide  10 mg Oral Daily    levothyroxine  112 mcg Oral QAM AC    metoprolol succinate  100 mg Oral BID    pravastatin  40 mg Oral Daily    sodium chloride flush  10 mL IntraVENous 2 times per day    budesonide  0.5 mg Nebulization BID    And    Arformoterol Tartrate  15 mcg Nebulization BID     PRN Meds: sodium chloride flush, sodium chloride, promethazine **OR** ondansetron, polyethylene glycol    Labs:     Recent Labs     03/21/22  1642 03/22/22  0520   WBC 10.6 9.1   HGB 12.6 12.5   HCT 38.7 38.2    204       Recent Labs     03/21/22  1642 03/22/22  0519    140   K 4.0 3.6   CL 99 103   CO2 26 20*   BUN 26* 23   CREATININE 1.6* 1.4*   CALCIUM 9.3 9.0       Recent Labs     03/21/22  1642   PROT 7.4   ALKPHOS 106*   ALT 19   AST 19   BILITOT 0.6         Chronic labs:      Radiology: none today  +++++++++++++++++++++++++++++++++++++++++++++++++  Ormatilde Ortiz DO  [address]  +++++++++++++++++++++++++++++++++++++++++++++++++  NOTE: This report was transcribed using voice recognition software. Every effort was made to ensure accuracy; however, inadvertent computerized transcription errors may be present.

## 2022-03-22 NOTE — TELEPHONE ENCOUNTER
I called patient's daughter, Eduardo Albright. Patient went to ED last night. She was cardioverted then admitted. Eduardo Albright verified EP was consulted on her mother. I mentioned Dr. Richardson Carranza recommendation about Amiodarone. Rayshawn informed me Amiodarone was stopped because of elevated liver enzymes.     Jaja Hutchison RN, BSN  [facility]

## 2022-03-22 NOTE — PROGRESS NOTES
Pt intermittently going in and out if afib RVR, as high as 150's. THEO Pretty notified and aware. Mike Castillo said she has paroxysmal a- fib and have a tx plan. Pt asymptomatic at this time.

## 2022-03-23 LAB
ANION GAP SERPL CALCULATED.3IONS-SCNC: 14 MMOL/L (ref 7–16)
BUN BLDV-MCNC: 20 MG/DL (ref 6–23)
CALCIUM SERPL-MCNC: 9.2 MG/DL (ref 8.6–10.2)
CHLORIDE BLD-SCNC: 103 MMOL/L (ref 98–107)
CO2: 22 MMOL/L (ref 22–29)
CREAT SERPL-MCNC: 1.3 MG/DL (ref 0.5–1)
GFR AFRICAN AMERICAN: 47
GFR NON-AFRICAN AMERICAN: 39 ML/MIN/1.73
GLUCOSE BLD-MCNC: 98 MG/DL (ref 74–99)
MAGNESIUM: 2.4 MG/DL (ref 1.6–2.6)
POTASSIUM SERPL-SCNC: 3.8 MMOL/L (ref 3.5–5)
SODIUM BLD-SCNC: 139 MMOL/L (ref 132–146)
TSH SERPL DL<=0.05 MIU/L-ACNC: 2.81 UIU/ML (ref 0.27–4.2)

## 2022-03-23 PROCEDURE — 97530 THERAPEUTIC ACTIVITIES: CPT

## 2022-03-23 PROCEDURE — 83735 ASSAY OF MAGNESIUM: CPT

## 2022-03-23 PROCEDURE — 36415 COLL VENOUS BLD VENIPUNCTURE: CPT

## 2022-03-23 PROCEDURE — 6360000002 HC RX W HCPCS: Performed by: FAMILY MEDICINE

## 2022-03-23 PROCEDURE — 84443 ASSAY THYROID STIM HORMONE: CPT

## 2022-03-23 PROCEDURE — 97161 PT EVAL LOW COMPLEX 20 MIN: CPT

## 2022-03-23 PROCEDURE — 80048 BASIC METABOLIC PNL TOTAL CA: CPT

## 2022-03-23 PROCEDURE — 6370000000 HC RX 637 (ALT 250 FOR IP): Performed by: NURSE PRACTITIONER

## 2022-03-23 PROCEDURE — 6370000000 HC RX 637 (ALT 250 FOR IP): Performed by: FAMILY MEDICINE

## 2022-03-23 PROCEDURE — 97535 SELF CARE MNGMENT TRAINING: CPT

## 2022-03-23 PROCEDURE — 2140000000 HC CCU INTERMEDIATE R&B

## 2022-03-23 PROCEDURE — 94640 AIRWAY INHALATION TREATMENT: CPT

## 2022-03-23 PROCEDURE — 2580000003 HC RX 258: Performed by: FAMILY MEDICINE

## 2022-03-23 PROCEDURE — 97165 OT EVAL LOW COMPLEX 30 MIN: CPT

## 2022-03-23 PROCEDURE — 93005 ELECTROCARDIOGRAM TRACING: CPT | Performed by: NURSE PRACTITIONER

## 2022-03-23 RX ORDER — POTASSIUM CHLORIDE 20 MEQ/1
20 TABLET, EXTENDED RELEASE ORAL ONCE
Status: COMPLETED | OUTPATIENT
Start: 2022-03-23 | End: 2022-03-23

## 2022-03-23 RX ORDER — DOFETILIDE 0.12 MG/1
125 CAPSULE ORAL EVERY 12 HOURS SCHEDULED
Status: DISCONTINUED | OUTPATIENT
Start: 2022-03-23 | End: 2022-03-26 | Stop reason: HOSPADM

## 2022-03-23 RX ADMIN — APIXABAN 2.5 MG: 2.5 TABLET, FILM COATED ORAL at 22:20

## 2022-03-23 RX ADMIN — SODIUM CHLORIDE, PRESERVATIVE FREE 10 ML: 5 INJECTION INTRAVENOUS at 22:20

## 2022-03-23 RX ADMIN — DILTIAZEM HYDROCHLORIDE 120 MG: 120 CAPSULE, COATED, EXTENDED RELEASE ORAL at 10:28

## 2022-03-23 RX ADMIN — SODIUM CHLORIDE, PRESERVATIVE FREE 10 ML: 5 INJECTION INTRAVENOUS at 10:26

## 2022-03-23 RX ADMIN — FUROSEMIDE 10 MG: 20 TABLET ORAL at 10:28

## 2022-03-23 RX ADMIN — METOPROLOL SUCCINATE 75 MG: 50 TABLET, EXTENDED RELEASE ORAL at 10:27

## 2022-03-23 RX ADMIN — BUDESONIDE 500 MCG: 0.5 SUSPENSION RESPIRATORY (INHALATION) at 22:34

## 2022-03-23 RX ADMIN — APIXABAN 2.5 MG: 2.5 TABLET, FILM COATED ORAL at 10:28

## 2022-03-23 RX ADMIN — DOFETILIDE 125 MCG: 0.12 CAPSULE ORAL at 17:59

## 2022-03-23 RX ADMIN — LEVOTHYROXINE SODIUM 112 MCG: 0.11 TABLET ORAL at 06:32

## 2022-03-23 RX ADMIN — POTASSIUM CHLORIDE 20 MEQ: 20 TABLET, EXTENDED RELEASE ORAL at 15:58

## 2022-03-23 RX ADMIN — BUDESONIDE 500 MCG: 0.5 SUSPENSION RESPIRATORY (INHALATION) at 08:08

## 2022-03-23 RX ADMIN — METOPROLOL SUCCINATE 75 MG: 50 TABLET, EXTENDED RELEASE ORAL at 22:21

## 2022-03-23 RX ADMIN — PRAVASTATIN SODIUM 40 MG: 20 TABLET ORAL at 10:27

## 2022-03-23 ASSESSMENT — PAIN SCALES - GENERAL
PAINLEVEL_OUTOF10: 0

## 2022-03-23 NOTE — PROGRESS NOTES
Hospitalist Progress Note      SYNOPSIS: Patient admitted on 3/21/2022 for Atrial fibrillation with RVR [facility])    Chief complaint=  Patient confirms chief complaint for this hospital admission stating she felt she could not breathe  SUBJECTIVE:    Patient seen and examined  Records reviewed. Review of systems  General= denies fever  Pulmonary= as per HPI    GI= denies diarrhea        Temp (24hrs), Av.1 °F (36.2 °C), Min:96.9 °F (36.1 °C), Max:97.7 °F (36.5 °C)    DIET: ADULT DIET;  Regular  CODE: Full Code    Intake/Output Summary (Last 24 hours) at 3/23/2022 1732  Last data filed at 3/23/2022 1339  Gross per 24 hour   Intake 450 ml   Output 1350 ml   Net -900 ml       OBJECTIVE: 98% SPO2    BP (!) 94/52   Pulse 75   Temp 97.7 °F (36.5 °C) (Temporal)   Resp 18   Ht 5' 5\" (1.651 m)   Wt 169 lb (76.7 kg)   SpO2 98%   BMI 28.12 kg/m²     General appearance: Elderly not in extremis     Respiratory: Comfortable resp pattern at rest     Skin: No petechiae  Neurologic: Awake and alert  Mood and affect and thinking are normal  Language intact  Patient can tell me her full name as well as today's date and current location  No noticeable tremor in her upper extremities  No dysarthria or aphasia    ASSESSMENT:    A. fib   Pacemaker  Hypothyroid unspecified  Chronic kidney disease stage III  Hyperlipidemia unspecified  Chronic COPD-currently not in exacerbation  Copd class/Stage unclear/  QTC prolonged raising concern that certain meds may be contributing    -phenerga     Brovana/aformeterol     PLAN:per ep  Initiate tikosyn 125mg po bid    Needs to be in the hospital 3 days in hospit telemet monitoring  discontin brovana and phenergan  Continue current dose of levothyroxine  Avoid nephrotoxic agents  Monitor serum potassium  Check magnesium level  Continue pravastatin for hyperlipidemia  Continue  incentive spirome and budesonide  for COPD  DISPOSITION: Patient lives at home    Medications:  REVIEWED DAILY    Infusion Medications    sodium chloride       Scheduled Medications    dofetilide  125 mcg Oral 2 times per day    metoprolol succinate  75 mg Oral BID    dilTIAZem  120 mg Oral Daily    magnesium sulfate  2,000 mg IntraVENous Once    apixaban  2.5 mg Oral BID    furosemide  10 mg Oral Daily    levothyroxine  112 mcg Oral QAM AC    pravastatin  40 mg Oral Daily    sodium chloride flush  10 mL IntraVENous 2 times per day    budesonide  0.5 mg Nebulization BID     PRN Meds: sodium chloride flush, sodium chloride, promethazine **OR** [DISCONTINUED] ondansetron, polyethylene glycol    Labs:     Recent Labs     03/21/22  1642 03/22/22  0520   WBC 10.6 9.1   HGB 12.6 12.5   HCT 38.7 38.2    204       Recent Labs     03/21/22  1642 03/22/22  0519 03/23/22  1122    140 139   K 4.0 3.6 3.8   CL 99 103 103   CO2 26 20* 22   BUN 26* 23 20   CREATININE 1.6* 1.4* 1.3*   CALCIUM 9.3 9.0 9.2       Recent Labs     03/21/22  1642   PROT 7.4   ALKPHOS 106*   ALT 19   AST 19   BILITOT 0.6         Chronic labs:      Radiology: none today  +++++++++++++++++++++++++++++++++++++++++++++++++  Mariely Ceja,   [address]  +++++++++++++++++++++++++++++++++++++++++++++++++  NOTE: This report was transcribed using voice recognition software. Every effort was made to ensure accuracy; however, inadvertent computerized transcription errors may be present.

## 2022-03-23 NOTE — PROGRESS NOTES
[address] ELECTROPHYSIOLOGY DEPARTMENT/DIVISION OF CARDIOLOGY  Inpatient Progress Note. PATIENT: Sahil Negrete  MEDICAL RECORD NUMBER: [id number]  DATE OF SERVICE:  3/23/2022  PRIMARY ELECTROPHYSIOLOGIST:  Pasha Rojas MD  REFERRING PHYSICIAN: No ref. provider Geovanny Campbell  CHIEF COMPLAINT: Atrial fibrillation    HPI: Sahil Negrete is a 81 y.o. female with a history of nonvalvular persistent AF sp DCCV (6/13/2019 and 3/21/2022), HFpEF-recovered/nonischemic sp [place] CRT-P (dual-chamber PPM DOI: 5/4/2012; CRT-P upgrade: 3/11/2019), CAD sp stent RCA (2017), HTN, CKD 3/4, spinal stenosis, and hypothyroidism. She is managed by Maia Christianson and Tom Gtz with apixaban 2.5 mg twice daily, Lasix 10 mg daily, metoprolol  mg twice daily, and Pravachol 40 mg daily. In 2012, she was reportedly diagnosed with intermittent AV block and treated with a [place] dual-chamber pacemaker. She was diagnosed with atrial fibrillation at some point prior to 2013. She reportedly was intolerant to amiodarone in the past due to elevated LFTs this was confiremed by her Daughter. In 2019, she transferred EP care from outside hospital to [address] [place] [address], Dr Tom Gtz. At that time she was noted to have LVEF of 45% with RV pacing greater than 40%, which was treated with upgrade of dual-chamber pacemaker to CRT P. In June 2019, she had a recurrence of AF around this time and was treated with direct-current cardioversion and sotalol 120 mg daily. In November 2019, patient discontinued sotalol on her own. In the past, discussion of AVJ ablation was had between establish EP and patient/family and if she were to develop increased AF/AT burden with reduction in BiV pacing. She is enrolled in remote monitoring which is reported reduction in BiV pacing to 44% and in AF burden of approximately 50 to 60% with episodes of RVR. This was treated with titration of beta-blockade.   On 3/21/2022, she presented with chief complaint of dyspnea. She was diagnosed with AF with RVR (121 bpm). A direct-current cardioversion was performed, which reportedly restored sinus rhythm for a brief period of time prior to return of AF. She spontaneously converted to sinus on hospital day one yet continues to have paroxysmal of atrial fibrillation. Her EKG showed a QTc of 541 on the 22nd and today shows a QTc of 493ms with Bi-V pacing. EP service is now consulted by admitting physician for evaluation management of AF. Patient denies any other complaints at this time. Prior cardiac testing:  · ECG (03/23/22): AP- rate 75 bpm,  QTc 493ms. · ECG (4/28/2021): A paced-V paced at 76 bpm, QTC =396 ms. · Pharmacologic nuclear stress test (6/12/2020): LVEF =79%, normal perfusion with apical attenuation artifact. · TTE (1/28/2020): LVEF =55 to 60%, mild to moderate concentric LVH, severe CHRISTINE, mild MR, and moderate TR. · Pharmacologic nuclear stress test (6/12/2019): LVEF =70% with mild inferior hypokinesis and apical dyskinesia, no ischemia. · TTE (4/10/2017): LVEF =70%, mild LAE  · LHC (6/26/2017): RCA dominant circulation 80% mid RCA stenosis treated with DANE x1.  · SANGITA (6/23/2017): LVEF = \"grossly normal\", severe LAE, no LA/KATE thrombus, moderate MR, moderate TR. · Pharmacologic nuclear stress test (6/3/2017): LVEF =77%, attenuation artifact of the inferior wall secondary to hepatic uptake, no ischemia. · TTE (6/3/2017): LVEF =65%, borderline concentric LVH, severe LAE, moderate NELA, moderate MR, moderate TR, mild pulmonary hypertension.     Past Medical History:   Diagnosis Date    Atrial fibrillation (HCC)     Heart palpitations     History of kidney problems     History of stress test     Hyperlipidemia     Hypertension     Hypothyroidism     Vertigo      Past Surgical History:   Procedure Laterality Date    CARDIOVERSION  06/13/2019    Dr. Ashkan Astudillo  CORONARY ANGIOPLASTY WITH STENT PLACEMENT Right 06/26/2017    Dr Socorro Carson Right     knee    PACEMAKER INSERTION  05/04/2012    D-PPM  Dr. Loreto Lindsey  03/11/2019    UPGRADE D-PPM TO BIV PPM / NEW LV LEAD   (Hollis Flood)   DR. Flavio Galvez SPINE SURGERY      lumbar lameinectomy with screw stablization    TRANSESOPHAGEAL ECHOCARDIOGRAM  06/23/2017    Dr. Vipul Gardner SANGITA     UPPER GASTROINTESTINAL ENDOSCOPY          Current Facility-Administered Medications   Medication Dose Route Frequency Provider Last Rate Last Admin    metoprolol succinate (TOPROL XL) extended release tablet 75 mg  75 mg Oral BID Caio Bryce, APRN - CNP   75 mg at 03/23/22 1027    dilTIAZem (CARDIZEM CD) extended release capsule 120 mg  120 mg Oral Daily Caio Bryce, APRN - CNP   120 mg at 03/23/22 1028    magnesium sulfate 2000 mg in 50 mL IVPB premix  2,000 mg IntraVENous Once Rajendra Sullivans, DO        apixaban Viktoria Peak) tablet 2.5 mg  2.5 mg Oral BID Ambridge Samantha, DO   2.5 mg at 03/23/22 1028    furosemide (LASIX) tablet 10 mg  10 mg Oral Daily Ambridge Samantha, DO   10 mg at 03/23/22 1028    levothyroxine (SYNTHROID) tablet 112 mcg  112 mcg Oral QAM AC Rajendra Sullivans, DO   112 mcg at 03/23/22 4783    pravastatin (PRAVACHOL) tablet 40 mg  40 mg Oral Daily Ambridge Samantha, DO   40 mg at 03/23/22 1027    sodium chloride flush 0.9 % injection 10 mL  10 mL IntraVENous 2 times per day Ambridge Samantha, DO   10 mL at 03/23/22 1026    sodium chloride flush 0.9 % injection 10 mL  10 mL IntraVENous PRN Ambridge Samantha, DO        0.9 % sodium chloride infusion  25 mL IntraVENous PRN Ambridge Samantha, DO        promethazine (PHENERGAN) tablet 12.5 mg  12.5 mg Oral Q6H PRN Ambridge Samantha, DO        polyethylene glycol (GLYCOLAX) packet 17 g  17 g Oral Daily PRN Ambridge Samantha, DO        budesonide (PULMICORT) nebulizer suspension 500 mcg  0.5 mg Nebulization BID Rajendra Sullivans, DO   500 mcg at 03/23/22 0808        Allergies   Allergen Reactions    Acetaminophen      Other reaction(s): UPSET STOMACH AND NAUSEA    Amiodarone Other (See Comments)     Causes issues with liver enzymes.  Hydrocodone      Other reaction(s): UPSET STOMACH AND NAUSEA       ROS:   Constitutional: Negative for fever, activity change and appetite change. HENT: Negative for epistaxis. Eyes: Negative for diploplia, blurred vision. Respiratory: Negative for cough, chest tightness, shortness of breath and wheezing. Cardiovascular: pertinent positives in HPI  Gastrointestinal: Negative for abdominal pain and blood in stool. Genitourinary: Negative for hematuria and difficulty urinating. Musculoskeletal: Negative for myalgias and gait problem. Skin: Negative for color change and rash. Neurological: Negative for syncope and light-headedness. Psychiatric/Behavioral: Negative for confusion and agitation. The patient is not nervous/anxious. Heme: no bleeding disorders, no melena or hematochezia  All other review of systems are negative     PHYSICAL EXAM:  Vitals:    03/22/22 1630 03/22/22 2125 03/23/22 0030 03/23/22 0600   BP: 107/67 (!) 100/58 98/60 121/67   Pulse: 75 75 73 75   Resp: 17 18 18 18   Temp: 97.5 °F (36.4 °C) 96.9 °F (36.1 °C) 96.9 °F (36.1 °C) 96.9 °F (36.1 °C)   TempSrc:  Temporal Temporal Temporal   SpO2: 98% 95% 95% 98%   Weight:       Height:       Constitutional: Well-developed, no acute distress, well groomed  Eyes: conjunctivae normal, no xanthelasma   Ears, Nose, Throat: oral mucosa moist, no cyanosis   Neck: supple, no JVD   CV: RRR, peripheral pulses normal with bilateral radial and pedal pulses   Lungs: normal respiratory effort without used of accessory muscles, no audible wheezes  Abdomen: nondistended  Extremities: no digital clubbing, no edema   Skin: warm, no rashes, CI ED site clean dry intact without erythema edema or drainage.   Neuro/Psych: A&O x 3, normal mood and given her age and renal dysfunction. She needs annual CBC and CMP. -Rhythm control preferred in the setting of CRT and HF. She was  intolerant to amiodarone in the past due to LFT elevation. In 2019, she was treated with sotalol direct-current cardioversion. Approximately 5 months after starting sotalol she discontinued on her own for unclear reasons. Since that time she has been monitored via pacemaker. Recently she is noted to have increased AT/AF burden with episodes of RVR inhibiting CRT pacing. A direct-current cardioversion in the ED reportedly succeeded in restoring sinus rhythm, but this lasted for only brief period of time prior to recurrence of AF. Her [id number]/22 was 1.4. Her creatinine clearance is 35. I discussed options of 3-day admit for Tikosyn 125 mcg twice daily with possible DCCV, AF/AFL ablation with possible need for antiarrhythmic therapy as well, or AVJ ablation and reprogramming to VVIR. After review of indications, material risks, benefits of each option with patient and daughter, they desire tikosyn 125 mcg BID with 3 day in-hospital telemetry monitoring. QTc was 521 msec on 03/22 in the setting of multiple QTc prolonging medications (Brovana, phenergan) and today improved to 493 ms after offending medications were discontinued. - Toprol was reduced from 125 to 75mg BID and Cardizem was started as she does not feel well on high dose BB.   -Modifiable risk factors:  --Obesity: BMI goal less than 27.  --VALE: Consider outpatient sleep study.  --HTN: BP goal less than 130/80  --Alcohol: Recommend avoid    2. HFpEF-recovered/nonischemic sp [place] CRT-P (dual-chamber PPM DOI: 5/4/2012; CRT-P upgrade: 3/11/2019)  -In 2019, she transferred EP care from outside hospital to [address] [place] [address], Dr Kristen Bhatti.   At that time she was noted to have LVEF of 45% with RV pacing greater than 40%, which was treated with upgrade of dual-chamber pacemaker to CRT P.  -I could find no record of cardiac testing with reported LVEF reduction. However her most recent assessment in 2020 reported LVEF =79%. -PA and lateral chest x-ray: LV lead in mid, posterior lateral location. -AF interfering with CRT pacing. See #1    3. CAD sp stent RCA (2017)  -Managed by Dr. Danielle Chambers. 4.  Intermittent AV block  -Reported in the past as the initial indication for dual-chamber pacemaker in 2012. I have spent a total of 30 minutes with the patient and his/her family reviewing the above stated recommendations. And a total of >50% of that time involved face-to-face time providing counseling and or coordination of care with the other providers. Thank you for allowing me to participate in your patient's care. Please call me if there are any questions.       THEO Herring - CNP   Cardiac Electrophysiology  [address] [place] [address]

## 2022-03-23 NOTE — PROGRESS NOTES
Occupational Therapy  OCCUPATIONAL THERAPY INITIAL EVALUATION     [address]  [facility], [address] [place] [address] [address]      Date:3/23/2022                                                Patient Name: Hunter Moses  MRN: [id number]  : 1935  Room: [address] [place] [address] #[id number]    Referring Provider: Heidy Lopez DO  Specific Provider Orders/Date: OT eval and treat 22    Diagnosis: Atrial fibrillation with RVR ([address] [I48.91]       Pertinent Medical History:  has a past medical history of Atrial fibrillation ([address] Heart palpitations, History of kidney problems, History of stress test, Hyperlipidemia, Hypertension, Hypothyroidism, and Vertigo.      Precautions:  Fall Risk    Assessment of current deficits   [x] Functional mobility  [x]ADLs  [x] Strength               []Cognition    [x] Functional transfers   [x] IADLs         [x] Safety Awareness   [x]Endurance    [] Fine Coordination              [x] Balance      [] Vision/perception   []Sensation     []Gross Motor Coordination  [] ROM  [] Delirium                   [] Motor Control     OT PLAN OF CARE   OT POC based on physician orders, patient diagnosis and results of clinical assessment    Frequency/Duration 1-3 days/wk for 2 weeks PRN   Specific OT Treatment Interventions to include:   * Instruction/training on adapted ADL techniques and AE recommendations to increase functional independence within precautions       * Training on energy conservation strategies, correct breathing pattern and techniques to improve independence/tolerance for self-care routine  * Functional transfer/mobility training/DME recommendations for increased independence, safety, and fall prevention  * Patient/Family education to increase follow through with safety techniques and functional independence  * Recommendation of environmental modifications for increased safety with functional transfers/mobility and ADLs  * Therapeutic exercise to improve motor endurance, ROM, and functional strength for ADLs/functional transfers  * Therapeutic activities to facilitate/challenge dynamic balance, stand tolerance for increased safety and independence with ADLs  * Therapeutic activities to facilitate gross/fine motor skills for increased independence with ADLs      Recommended Adaptive Equipment: TBD     Home Living: Pt lives alone in 1 floor home (+basement). 2 ARASH, 2 handrails  Basement laundry - flight of stairs, 2 handrails (daughter sometimes assist)  Daughter lives a block away    Bathroom setup: tub/shower with shower seat    Equipment owned: w/w    Prior Level of Function: independent/mod I with ADLs , independent/mod I with IADLs; ambulated w/ w/w   Driving: yes    Pain Level: Pt c/o no pain this session     Cognition: A&O: 4/4; Follows 1-2 step directions   Memory:  good    Sequencing:  good    Problem solving:  fair +   Judgement/safety:  fair +     Functional Assessment:  AM-PAC Daily Activity Raw Score: 19/24   Initial Eval Status  Date: 3/23/22 Treatment Status  Date: STGs = LTGs  Time frame: 10-14 days   Feeding Independent   -   Grooming Stand by Assist   Standing w/ w/w  Modified [place]    UB Dressing Supervision   Gown  Modified [place]    LB Dressing Stand by Assist   B socks  Modified [place]    Bathing Stand by Assist  Modified [place]    Toileting Stand by Assist   Modified [place]    Bed Mobility  Supine to sit: Supervision  Sit to supine: Supervision  Supine to sit:  Independent  Sit to supine:Independent   Functional Transfers Stand by Assist   Various surfaces (BSC, EOB)  Modified [place]    Functional Mobility Stand by Assist w/ w/w  Modified [place]    Balance Sitting:     Static:  Independent    Dynamic:Supervision  Standing: SBA w/ w/w     Activity Tolerance Fair  Good   Visual/  Perceptual Glasses: no                  Hand Dominance R   AROM (PROM) Strength Additional Info:    RUE  WFL 4/5 good  and wfl FMC/dexterity noted during ADL tasks       LUE WFL 4/5 good  and wfl FMC/dexterity noted during ADL tasks       Hearing: WFL   Sensation:  No c/o numbness or tingling   Tone: WFL   Edema: none noted    Comments: Upon arrival patient lying in bed. Therapist educated pt on role of OT. At end of session, patient lying in bed with call light and phone within reach, all lines and tubes intact. Overall patient demonstrated decreased independence and safety during completion of ADL/functional transfer/mobility tasks. Pt would benefit from continued skilled OT to increase safety and independence with completion of ADL/IADL tasks for functional independence and quality of life. Treatment: OT treatment provided this date includes: Facilitation of bed mobility, unsupported sitting balance (addressing posture, weight shifting, dynamic reaching to prep for ADL's), functional transfers (various surfaces w/ education/cues for safety/hand placement), standing tolerance tasks (addressing posture, balance and activity tolerance while incorporating light functional reaching impacting ADLs and functional activity) and functional ambulation tasks with w/w (w/ education/cuing on posture, w/w management and safety). Therapist facilitated self-care retraining: UB/LB self-care tasks, simulated toileting task and standing grooming tasks while educating/cuing pt on modified techniques, posture, safety and energy conservation techniques. Skilled monitoring of HR, O2 sats and pts response to treatment. Pt on room air. HR=81-90 bpm    Rehab Potential: Good for established goals     Patient / Family Goal: not stated     Patient and/or family were instructed on functional diagnosis, prognosis/goals and OT plan of care. Demonstrated fair+ understanding.      Eval Complexity: Low    Time In: 13:59  Time Out: 14:22  Total Treatment Time: 9 minutes    Min Units   OT Eval Low 97165  X 1   OT Eval Medium 79871      OT Eval High 95323      OT Re-Eval [id number]       Therapeutic Ex 77971       Therapeutic Activities 26020       ADL/Self Care 92620  9  1   Orthotic Management 65835       Manual 13513     Neuro Re-Ed 80316       Non-Billable Time          Evaluation Time additionally includes thorough review of current medical information, gathering information on past medical history/social history and prior level of function, interpretation of standardized testing/informal observation of tasks, assessment of data and development of plan of care and goals.         Joleen OTR/L #[id number]

## 2022-03-23 NOTE — PROGRESS NOTES
Physical Therapy  Physical Therapy Initial Assessment     Name: Norris Casey  : 1935  MRN: [id number]      Date of Service: 3/23/2022    Evaluating PT:  Bernice High, PT, DPT    Room #:  8368/7488-V  Diagnosis:  Atrial fibrillation with RVR ([facility] [I48.91]  PMHx/PSHx:  HLD, HTN, hypothyroidism, afib, pacemaker  Procedure/Surgery:  N/A  Precautions:  Fall risk  Equipment Needs:  TBD    SUBJECTIVE:    Pt lives alone in a 1 story home with 2 steps to enter and 1 handrail. Bed/bath is on 1st floor. Pt ambulated with cane PTA. OBJECTIVE:   Initial Evaluation  Date: 3/23/22 Treatment Short Term/ Long Term   Goals   AM-PAC 6 Clicks 75/55     Was pt agreeable to Eval/treatment? yes     Does pt have pain?  No pain     Bed Mobility  Rolling: SBA  Supine to sit: SBA  Sit to supine: NT  Scooting: SBA  Rolling: IND  Supine to sit: IND  Sit to supine: IND  Scooting: IND   Transfers Sit to stand: SBA  Stand to sit: SBA  Stand pivot: SBA with ww  Sit to stand: IND  Stand to sit: IND  Stand pivot: mod I with AAD   Ambulation    40'x2 with ww SBA  100'+ with AAD mod I   Stair negotiation: ascended and descended  5 steps with 1 handrail CGA  2 steps with 1 handrail mod I     Strength/ROM:   BLE grossly 4/5  BLE AROM WFL    Balance:   Static Sitting: Supervision  Dynamic Sitting: SBA  Static Standing: Supervision with ww  Dynamic Standing: SBA with ww    Pt is A & O x 3  Sensation:  Pt denies numbness and tingling to extremities  Edema:  unremarkable    Therapeutic Exercises:    Bed mobility: supine<>sit, cued for EOB positioning  Transfers: STSx2, cued for hand placement and postural correction  Ambulation: 40'x2 with ww  Stair negotiation: 5 steps with 1 HR  BLE AROM    Patient education  Pt educated on role of PT, importance of functional mobility during hospital stay, safety with functional activity    Patient response to education:   Pt verbalized understanding Pt demonstrated skill Pt requires further education in this area   yes yes reinforce     ASSESSMENT:    Conditions Requiring Skilled Therapeutic Intervention:    [x]Decreased strength     []Decreased ROM  [x]Decreased functional mobility  [x]Decreased balance   [x]Decreased endurance   [x]Decreased posture  []Decreased sensation  []Decreased coordination   []Decreased vision  []Decreased safety awareness   []Increased pain       Comments:    Pt supine in bed upon entering, agreeable to participate. Pt instructed to transfer to EOB, pt completing transfer with no physical assistance. Pt sitting upright demonstrating good static sitting balance. Pt reporting no dizziness or lightheadedness with position change. Pt instructed to transfer from EOB and ambulate to tolerance. Pt demonstrated good balance with ww, ambulating with flexed trunk and fair barbara. Pt agreeable to trial stair negotiation, completing 5 steps with 1 handrail. Pt ascending and descending steps with step-to pattern and B hands on single HR. Pt returned to room, reporting mild fatigue after bout. Pt transferred to bedside chair at the end of session. All needs met and call bell in reach prior to exiting. Treatment:  Patient practiced and was instructed in the following treatment:     Bed mobility training - pt given verbal and tactile cues to facilitate proper sequencing and safety during rolling and supine>sit.  STS transfer training - pt educated on proper hand and foot placement, safety and sequencing, and use of verbal and tactile cues to safely complete sit<>stand transfers.  Gait training- pt was given verbal and tactile cues to facilitate pt safety during ambulation and stair negotiation with hands on assistance to complete task safely       Pt's/ family goals   1. Return home    Prognosis is good for reaching above PT goals. Patient and or family understand(s) diagnosis, prognosis, and plan of care.   yes    PHYSICAL THERAPY PLAN OF CARE:    PT POC is established based on physician order and patient diagnosis     Referring provider/PT Order:  Charley Calle DO  Diagnosis:  Atrial fibrillation with RVR ([address] [I48.91]  Specific instructions for next treatment:  Progress as tolerated    Current Treatment Recommendations:     [x] Strengthening to improve independence with functional mobility   [] ROM to improve independence with functional mobility   [x] Balance Training to improve static/dynamic balance and to reduce fall risk  [x] Endurance Training to improve activity tolerance during functional mobility   [x] Transfer Training to improve safety and independence with all functional transfers   [x] Gait Training to improve gait mechanics, endurance and assess need for appropriate assistive device  [] Stair Training in preparation for safe discharge home and/or into the community   [] Positioning to prevent skin breakdown and contractures  [x] Safety and Education Training   [x] Patient/Caregiver Education   [] HEP  [] Other     PT long term treatment goals are located in above grid    Frequency of treatments: 2-5x/week x 1-2 weeks. Time in  1515  Time out  1535    Total Treatment Time  10 minutes     Evaluation Time includes thorough review of current medical information, gathering information on past medical history/social history and prior level of function, completion of standardized testing/informal observation of tasks, assessment of data and education on plan of care and goals.     CPT codes:  [x] Low Complexity PT evaluation 43645  [] Moderate Complexity PT evaluation 28464  [] High Complexity PT evaluation 80244  [] PT Re-evaluation 68761  [] Gait training 11068 -- minutes  [] Manual therapy [id number] -- minutes  [x] Therapeutic activities 93748 10 minutes  [] Therapeutic exercises 26118 -- minutes  [] Neuromuscular reeducation 00102 -- minutes     Sher Tavera, PT, DPT  [id number]

## 2022-03-23 NOTE — CARE COORDINATION
3/23/22 Transition of Care: Patient is here from home due to increased heart rate. EP is following and initiating Tikosyn therapy today due to elevation in heart rate despite cardizem. She does reside alone. Her pcp is Dr Omar Muñoz and pharmacy is [facility] in [place]. PT/OT pending. Will follow as patient will stay for three days for Tikosyn therapy.  Electronically signed by Lynn Steven RN CM on 3/23/2022 at 1:53 PM

## 2022-03-24 LAB
ANION GAP SERPL CALCULATED.3IONS-SCNC: 13 MMOL/L (ref 7–16)
BUN BLDV-MCNC: 16 MG/DL (ref 6–23)
CALCIUM SERPL-MCNC: 9 MG/DL (ref 8.6–10.2)
CHLORIDE BLD-SCNC: 105 MMOL/L (ref 98–107)
CO2: 25 MMOL/L (ref 22–29)
CREAT SERPL-MCNC: 1.3 MG/DL (ref 0.5–1)
EKG ATRIAL RATE: 75 BPM
EKG P AXIS: -18 DEGREES
EKG P AXIS: 104 DEGREES
EKG P AXIS: 34 DEGREES
EKG P AXIS: 63 DEGREES
EKG P-R INTERVAL: 184 MS
EKG P-R INTERVAL: 188 MS
EKG Q-T INTERVAL: 446 MS
EKG Q-T INTERVAL: 482 MS
EKG Q-T INTERVAL: 494 MS
EKG Q-T INTERVAL: 500 MS
EKG QRS DURATION: 106 MS
EKG QRS DURATION: 138 MS
EKG QTC CALCULATION (BAZETT): 498 MS
EKG QTC CALCULATION (BAZETT): 538 MS
EKG QTC CALCULATION (BAZETT): 551 MS
EKG QTC CALCULATION (BAZETT): 558 MS
EKG R AXIS: 100 DEGREES
EKG R AXIS: 170 DEGREES
EKG R AXIS: 180 DEGREES
EKG R AXIS: 180 DEGREES
EKG T AXIS: -5 DEGREES
EKG T AXIS: 1 DEGREES
EKG T AXIS: 5 DEGREES
EKG VENTRICULAR RATE: 75 BPM
GFR AFRICAN AMERICAN: 47
GFR NON-AFRICAN AMERICAN: 39 ML/MIN/1.73
GLUCOSE BLD-MCNC: 87 MG/DL (ref 74–99)
MAGNESIUM: 2.2 MG/DL (ref 1.6–2.6)
POTASSIUM SERPL-SCNC: 4.4 MMOL/L (ref 3.5–5)
SODIUM BLD-SCNC: 143 MMOL/L (ref 132–146)

## 2022-03-24 PROCEDURE — 93005 ELECTROCARDIOGRAM TRACING: CPT | Performed by: NURSE PRACTITIONER

## 2022-03-24 PROCEDURE — 6370000000 HC RX 637 (ALT 250 FOR IP): Performed by: FAMILY MEDICINE

## 2022-03-24 PROCEDURE — 83735 ASSAY OF MAGNESIUM: CPT

## 2022-03-24 PROCEDURE — 99232 SBSQ HOSP IP/OBS MODERATE 35: CPT | Performed by: NURSE PRACTITIONER

## 2022-03-24 PROCEDURE — 36415 COLL VENOUS BLD VENIPUNCTURE: CPT

## 2022-03-24 PROCEDURE — 94640 AIRWAY INHALATION TREATMENT: CPT

## 2022-03-24 PROCEDURE — 2580000003 HC RX 258: Performed by: FAMILY MEDICINE

## 2022-03-24 PROCEDURE — 93010 ELECTROCARDIOGRAM REPORT: CPT | Performed by: INTERNAL MEDICINE

## 2022-03-24 PROCEDURE — 6370000000 HC RX 637 (ALT 250 FOR IP): Performed by: NURSE PRACTITIONER

## 2022-03-24 PROCEDURE — 6360000002 HC RX W HCPCS: Performed by: FAMILY MEDICINE

## 2022-03-24 PROCEDURE — 93005 ELECTROCARDIOGRAM TRACING: CPT | Performed by: STUDENT IN AN ORGANIZED HEALTH CARE EDUCATION/TRAINING PROGRAM

## 2022-03-24 PROCEDURE — 2140000000 HC CCU INTERMEDIATE R&B

## 2022-03-24 PROCEDURE — 80048 BASIC METABOLIC PNL TOTAL CA: CPT

## 2022-03-24 RX ADMIN — METOPROLOL SUCCINATE 75 MG: 50 TABLET, EXTENDED RELEASE ORAL at 08:20

## 2022-03-24 RX ADMIN — PRAVASTATIN SODIUM 40 MG: 20 TABLET ORAL at 08:19

## 2022-03-24 RX ADMIN — SODIUM CHLORIDE, PRESERVATIVE FREE 10 ML: 5 INJECTION INTRAVENOUS at 08:21

## 2022-03-24 RX ADMIN — METOPROLOL SUCCINATE 75 MG: 50 TABLET, EXTENDED RELEASE ORAL at 21:48

## 2022-03-24 RX ADMIN — FUROSEMIDE 10 MG: 20 TABLET ORAL at 08:19

## 2022-03-24 RX ADMIN — LEVOTHYROXINE SODIUM 112 MCG: 0.11 TABLET ORAL at 06:02

## 2022-03-24 RX ADMIN — DOFETILIDE 125 MCG: 0.12 CAPSULE ORAL at 17:57

## 2022-03-24 RX ADMIN — APIXABAN 2.5 MG: 2.5 TABLET, FILM COATED ORAL at 08:19

## 2022-03-24 RX ADMIN — BUDESONIDE 500 MCG: 0.5 SUSPENSION RESPIRATORY (INHALATION) at 19:38

## 2022-03-24 RX ADMIN — DILTIAZEM HYDROCHLORIDE 120 MG: 120 CAPSULE, COATED, EXTENDED RELEASE ORAL at 08:19

## 2022-03-24 RX ADMIN — BUDESONIDE 500 MCG: 0.5 SUSPENSION RESPIRATORY (INHALATION) at 08:37

## 2022-03-24 RX ADMIN — APIXABAN 2.5 MG: 2.5 TABLET, FILM COATED ORAL at 21:48

## 2022-03-24 RX ADMIN — SODIUM CHLORIDE, PRESERVATIVE FREE 10 ML: 5 INJECTION INTRAVENOUS at 22:04

## 2022-03-24 RX ADMIN — DOFETILIDE 125 MCG: 0.12 CAPSULE ORAL at 06:02

## 2022-03-24 ASSESSMENT — PAIN SCALES - GENERAL
PAINLEVEL_OUTOF10: 0

## 2022-03-24 NOTE — PROGRESS NOTES
[address] ELECTROPHYSIOLOGY DEPARTMENT/DIVISION OF CARDIOLOGY  Inpatient Progress Note. PATIENT: Sahil Negrete  MEDICAL RECORD NUMBER: [id number]  DATE OF SERVICE:  3/24/2022  PRIMARY ELECTROPHYSIOLOGIST:  Pasha Rojas MD  REFERRING PHYSICIAN: No ref. provider Geovanny Campbell  CHIEF COMPLAINT: Atrial fibrillation    HPI: Sahil Negrete is a 81 y.o. female with a history of nonvalvular persistent AF sp DCCV (6/13/2019 and 3/21/2022), HFpEF-recovered/nonischemic sp Saint Neal CRT-P (dual-chamber PPM DOI: 5/4/2012; CRT-P upgrade: 3/11/2019), CAD sp stent RCA (2017), HTN, CKD 3/4, spinal stenosis, and hypothyroidism. She is managed by Maia PERRY [facility] and Tom Gtz with apixaban 2.5 mg twice daily, Lasix 10 mg daily, metoprolol  mg twice daily, and Pravachol 40 mg daily. In 2012, she was reportedly diagnosed with intermittent AV block and treated with a [place] Neal dual-chamber pacemaker. She was diagnosed with atrial fibrillation at some point prior to 2013. She reportedly was intolerant to amiodarone in the past due to elevated LFTs this was confiremed by her Daughter. In 2019, she transferred EP care from outside hospital to [facility], Dr Tom Gtz. At that time she was noted to have LVEF of 45% with RV pacing greater than 40%, which was treated with upgrade of dual-chamber pacemaker to CRT P. In June 2019, she had a recurrence of AF around this time and was treated with direct-current cardioversion and sotalol 120 mg daily. In November 2019, patient discontinued sotalol on her own. In the past, discussion of AVJ ablation was had between establish EP and patient/family and if she were to develop increased AF/AT burden with reduction in BiV pacing. She is enrolled in remote monitoring which is reported reduction in BiV pacing to 44% and in AF burden of approximately 50 to 60% with episodes of RVR. This was treated with titration of beta-blockade.   On 3/21/2022, she presented with chief complaint of dyspnea. She was diagnosed with AF with RVR (121 bpm). A direct-current cardioversion was performed, which reportedly restored sinus rhythm for a brief period of time prior to return of AF. She spontaneously converted to sinus on hospital day one yet continues to have paroxysmal of atrial fibrillation. Her EKG showed a QTc of 541 on the 22nd and on 03/23 showed a QTc of 493ms with Bi-V pacing. She was started on Tikosyn 125mcg Every 12 hours and her QTc today is 538ms. EP service is now consulted by admitting physician for evaluation management of AF. Patient denies any other complaints at this time. Prior cardiac testing:  · ECG (03/24/2022): AP- 75 bpm, QTc 538  · ECG (03/23/22): AP- rate 75 bpm,  QTc 493ms. · ECG (4/28/2021): A paced-V paced at 76 bpm, QTC =396 ms. · Pharmacologic nuclear stress test (6/12/2020): LVEF =79%, normal perfusion with apical attenuation artifact. · TTE (1/28/2020): LVEF =55 to 60%, mild to moderate concentric LVH, severe CHRISTINE, mild MR, and moderate TR. · Pharmacologic nuclear stress test (6/12/2019): LVEF =70% with mild inferior hypokinesis and apical dyskinesia, no ischemia. · TTE (4/10/2017): LVEF =70%, mild LAE  · LHC (6/26/2017): RCA dominant circulation 80% mid RCA stenosis treated with DANE x1.  · SANGITA (6/23/2017): LVEF = \"grossly normal\", severe LAE, no LA/KATE thrombus, moderate MR, moderate TR. · Pharmacologic nuclear stress test (6/3/2017): LVEF =77%, attenuation artifact of the inferior wall secondary to hepatic uptake, no ischemia. · TTE (6/3/2017): LVEF =65%, borderline concentric LVH, severe LAE, moderate NELA, moderate MR, moderate TR, mild pulmonary hypertension.     Past Medical History:   Diagnosis Date    Atrial fibrillation (HCC)     Heart palpitations     History of kidney problems     History of stress test     Hyperlipidemia     Hypertension     Hypothyroidism     Vertigo      Past Surgical History:   Procedure Laterality Date    CARDIOVERSION  06/13/2019    Dr. Mcdonald Postin Right 06/26/2017    Dr Alan Zuñiga Right     knee    PACEMAKER INSERTION  05/04/2012    D-PPM  Dr. Vicenta Elliott  03/11/2019    UPGRADE D-PPM TO BIV PPM / NEW LV LEAD   (Deronda Organ)   DR. Jasso Cleaves SPINE SURGERY      lumbar lameinectomy with screw stablization    TRANSESOPHAGEAL ECHOCARDIOGRAM  06/23/2017    Dr. Xin Pablo SANGITA     UPPER GASTROINTESTINAL ENDOSCOPY          Current Facility-Administered Medications   Medication Dose Route Frequency Provider Last Rate Last Admin    dofetilide (TIKOSYN) capsule 125 mcg  125 mcg Oral 2 times per day Taylor Saint, APRN - CNP   125 mcg at 03/24/22 0602    metoprolol succinate (TOPROL XL) extended release tablet 75 mg  75 mg Oral BID Delila Saint, APRN - CNP   75 mg at 03/24/22 0820    dilTIAZem (CARDIZEM CD) extended release capsule 120 mg  120 mg Oral Daily Taylor Saint, APRN - CNP   120 mg at 03/24/22 8324    magnesium sulfate 2000 mg in 50 mL IVPB premix  2,000 mg IntraVENous Once Sharlynn Clause, DO        apixaban Noelle Moment) tablet 2.5 mg  2.5 mg Oral BID Sharimer Clause, DO   2.5 mg at 03/24/22 6781    furosemide (LASIX) tablet 10 mg  10 mg Oral Daily Sharimer Clause, DO   10 mg at 03/24/22 9704    levothyroxine (SYNTHROID) tablet 112 mcg  112 mcg Oral QAM AC Sharlynn Clause, DO   112 mcg at 03/24/22 0602    pravastatin (PRAVACHOL) tablet 40 mg  40 mg Oral Daily Sharlynn Clause, DO   40 mg at 03/24/22 7406    sodium chloride flush 0.9 % injection 10 mL  10 mL IntraVENous 2 times per day Sharlynn Clause, DO   10 mL at 03/24/22 5085    sodium chloride flush 0.9 % injection 10 mL  10 mL IntraVENous PRN Manfred Clause, DO        0.9 % sodium chloride infusion  25 mL IntraVENous PRN Manfred Clause, DO        promethazine (PHENERGAN) tablet 12.5 mg  12.5 mg Oral Q6H PRN Serjio Anthony, DO        polyethylene glycol [facility]) packet 17 g  17 g Oral Daily PRN [place] Anthony, DO        budesonide (PULMICORT) nebulizer suspension 500 mcg  0.5 mg Nebulization BID [place] Anthony, DO   500 mcg at 03/24/22 8213        Allergies   Allergen Reactions    Acetaminophen      Other reaction(s): UPSET STOMACH AND NAUSEA    Amiodarone Other (See Comments)     Causes issues with liver enzymes.  Hydrocodone      Other reaction(s): UPSET STOMACH AND NAUSEA       ROS:   Constitutional: Negative for fever, activity change and appetite change. HENT: Negative for epistaxis. Eyes: Negative for diploplia, blurred vision. Respiratory: Negative for cough, chest tightness, shortness of breath and wheezing. Cardiovascular: pertinent positives in HPI  Gastrointestinal: Negative for abdominal pain and blood in stool. Genitourinary: Negative for hematuria and difficulty urinating. Musculoskeletal: Negative for myalgias and gait problem. Skin: Negative for color change and rash. Neurological: Negative for syncope and light-headedness. Psychiatric/Behavioral: Negative for confusion and agitation. The patient is not nervous/anxious.   Heme: no bleeding disorders, no melena or hematochezia  All other review of systems are negative     PHYSICAL EXAM:  Vitals:    03/24/22 0020 03/24/22 0559 03/24/22 0735 03/24/22 0837   BP: 104/62 103/72 111/60    Pulse: 73 75 75    Resp: 16 16 16    Temp: 96.5 °F (35.8 °C) 96.9 °F (36.1 °C) 97.6 °F (36.4 °C)    TempSrc: Temporal Temporal Temporal    SpO2: 97% 98% 97% 96%   Weight:       Height:       Constitutional: Well-developed, no acute distress, well groomed  Eyes: conjunctivae normal, no xanthelasma   Ears, Nose, Throat: oral mucosa moist, no cyanosis   Neck: supple, no JVD   CV: RRR, peripheral pulses normal with bilateral radial and pedal pulses   Lungs: normal respiratory effort without used of accessory muscles, no audible wheezes  Abdomen: nondistended  Extremities: no digital clubbing, no edema   Skin: warm, no rashes, CI ED site clean dry intact without erythema edema or drainage. Neuro/Psych: A&O x 3, normal mood and affect    Data:    Recent Labs     22  1642 22  0520   WBC 10.6 9.1   HGB 12.6 12.5   HCT 38.7 38.2    204     Recent Labs     22  0519 22  1122 22  0531    139 143   K 3.6 3.8 4.4    103 105   CO2 20* 22 25   BUN 23 20 16   CREATININE 1.4* 1.3* 1.3*   CALCIUM 9.0 9.2 9.0     No results for input(s): INR in the last 72 hours. Recent Labs     22  1122   TSH 2.810     Lab Results   Component Value Date    MG 2.2 2022     Telemetry: AP- with paroxysms of AF/AFL. EK2022, 1023 good-quality EKG AV paced rate 75 bpm, QTc 538. EK2022, 0841 questionable quality AV paced rate 77 bpm, QTc 543ms      Device Interrogation/Reprogramming (3/22/22)  · Make/Model: [employer] Neal Allure RF  · DOI: 19  · Battery: 6.5-7 years  · Kavitha Odell therapy: DDDR 75 - 120 bpm, AV delays = 150 msec (sensed), 180 msec (paced)  · Pacing %: RA = 15%, BiV = 22%  · Lead function:  · RA lead: sensing = 2.6 mV, impedance = 430 ohms, threshold = 0.75 V @ 0.5 msec  · RV lead: sensing = 2.3 mV, impedance = 410 ohms, threshold = 1.25 V @ 0.5 msec  · LV lead: sensing = N/A, impedance = 780 ohms, threshold = 1.0 V @ 0.5 msec  · Lead programming:  · RA lead: sensitivity = 0.1 mV, output = 2.5 V @ 0.5 msec  · RV lead: sensitivity = 1.0 mV, output = 2.375 V @ 0.5 msec  · LV lead: sensitivity = N/A, output = 2.0 V @ 0.5 msec  · Arrhythmias: AT/AF burden = 33% since 3/18/22 with episodes of RVR.   · Reprogramming included: none  · Overall device function is normal  · All device programmable settings were evaluated per above and in the scanned document, along with iterative adjustments (capture thresholds) to assess and select the most appropriate final programming to provide for consistent delivery of the appropriate therapy and to verify function of the device. Assessment/plan:  1. nonvalvular persistent AF sp DCCV (6/13/2019 and 3/21/2022)  -Initially diagnosed in 2013 or sometime prior to this.  -Chads vas score = 6 (age, sex, HF, CAD, HTN). Recommend [address] in females with score greater than or equal to 2. DOAC preferred.  -Continue apixaban 2.5 mg twice daily given her age and renal dysfunction. She needs annual CBC and CMP. -Rhythm control preferred in the setting of CRT and HF. She was  intolerant to amiodarone in the past due to LFT elevation. In 2019, she was treated with sotalol direct-current cardioversion. Approximately 5 months after starting sotalol she discontinued on her own for unclear reasons. Since that time she has been monitored via pacemaker. Recently she is noted to have increased AT/AF burden with episodes of RVR inhibiting CRT pacing. A direct-current cardioversion in the ED reportedly succeeded in restoring sinus rhythm, but this lasted for only brief period of time prior to recurrence of AF. Her [id number]/22 was 1.4. Her creatinine clearance is 35. I discussed options of 3-day admit for Tikosyn 125 mcg twice daily with possible DCCV, AF/AFL ablation with possible need for antiarrhythmic therapy as well, or AVJ ablation and reprogramming to VVIR. After review of indications, material risks, benefits of each option with patient and daughter, they desire tikosyn 125 mcg BID with 3 day in-hospital telemetry monitoring. QTc was 521 msec on 03/22 in the setting of multiple QTc prolonging medications (Brovana, phenergan) andimproved to 493 ms after offending medications were discontinued.  Tikosyn was started on 03/23/22 her QTc today after the second dose is 538 (in setting of BiV pacing) anticipate discharge 03/26/22  - Toprol was reduced from 125 to 75mg BID and Cardizem was started as she does not feel well on high dose BB.   -Modifiable risk factors:  --Obesity: BMI goal less than 27.  --VALE: Consider outpatient sleep study.  --HTN: BP goal less than 130/80  --Alcohol: Recommend avoid    2. HFpEF-recovered/nonischemic sp Saint Neal CRT-P (dual-chamber PPM DOI: 5/4/2012; CRT-P upgrade: 3/11/2019)  -In 2019, she transferred EP care from outside hospital to [facility], Dr Meredith Small. At that time she was noted to have LVEF of 45% with RV pacing greater than 40%, which was treated with upgrade of dual-chamber pacemaker to CRT P.  -I could find no record of cardiac testing with reported LVEF reduction. However her most recent assessment in 2020 reported LVEF =79%. -PA and lateral chest x-ray: LV lead in mid, posterior lateral location. -AF interfering with CRT pacing. See #1    3. CAD sp stent RCA (2017)  -Managed by Dr. Sharyle King. 4.  Intermittent AV block  -Reported in the past as the initial indication for dual-chamber pacemaker in 2012. I have spent a total of 30 minutes with the patient and his/her family reviewing the above stated recommendations. And a total of >50% of that time involved face-to-face time providing counseling and or coordination of care with the other providers. Thank you for allowing me to participate in your patient's care. Please call me if there are any questions.       THEO Rogers - CNP   Cardiac Electrophysiology  [address] [place] [address] Physicians

## 2022-03-24 NOTE — PATIENT CARE CONFERENCE
[facility] Quality Flow/Interdisciplinary Rounds Progress Note        Quality Flow Rounds held on March 24, 2022    Disciplines Attending:  Bedside Nurse, ,  and Nursing Unit Leadership    Dario Huntley was admitted on 3/21/2022  4:26 PM    Anticipated Discharge Date:  Expected Discharge Date: 03/23/22    Disposition:    Brenton Score:  Brenton Scale Score: 20    Readmission Risk              Risk of Unplanned Readmission:  11           Discussed patient goal for the day, patient clinical progression, and barriers to discharge.   The following Goal(s) of the Day/Commitment(s) have been identified:  Diagnostics - Report Results      Alondra Kathleen RN  March 24, 2022

## 2022-03-25 ENCOUNTER — TELEPHONE (OUTPATIENT)
Dept: NON INVASIVE DIAGNOSTICS | Age: 87
End: 2022-03-25

## 2022-03-25 LAB
ANION GAP SERPL CALCULATED.3IONS-SCNC: 10 MMOL/L (ref 7–16)
BUN BLDV-MCNC: 16 MG/DL (ref 6–23)
CALCIUM SERPL-MCNC: 8.9 MG/DL (ref 8.6–10.2)
CHLORIDE BLD-SCNC: 104 MMOL/L (ref 98–107)
CO2: 26 MMOL/L (ref 22–29)
CREAT SERPL-MCNC: 1.4 MG/DL (ref 0.5–1)
EKG ATRIAL RATE: 100 BPM
EKG ATRIAL RATE: 75 BPM
EKG ATRIAL RATE: 75 BPM
EKG P AXIS: -23 DEGREES
EKG P AXIS: 57 DEGREES
EKG P-R INTERVAL: 184 MS
EKG P-R INTERVAL: 186 MS
EKG P-R INTERVAL: 6 MS
EKG Q-T INTERVAL: 442 MS
EKG Q-T INTERVAL: 510 MS
EKG Q-T INTERVAL: 666 MS
EKG QRS DURATION: 112 MS
EKG QRS DURATION: 134 MS
EKG QRS DURATION: 298 MS
EKG QTC CALCULATION (BAZETT): 493 MS
EKG QTC CALCULATION (BAZETT): 569 MS
EKG QTC CALCULATION (BAZETT): 743 MS
EKG R AXIS: -176 DEGREES
EKG R AXIS: 118 DEGREES
EKG R AXIS: 171 DEGREES
EKG T AXIS: -13 DEGREES
EKG T AXIS: -2 DEGREES
EKG T AXIS: -8 DEGREES
EKG VENTRICULAR RATE: 75 BPM
GFR AFRICAN AMERICAN: 43
GFR NON-AFRICAN AMERICAN: 36 ML/MIN/1.73
GLUCOSE BLD-MCNC: 90 MG/DL (ref 74–99)
MAGNESIUM: 2.2 MG/DL (ref 1.6–2.6)
POTASSIUM SERPL-SCNC: 4.2 MMOL/L (ref 3.5–5)
SODIUM BLD-SCNC: 140 MMOL/L (ref 132–146)

## 2022-03-25 PROCEDURE — 94640 AIRWAY INHALATION TREATMENT: CPT

## 2022-03-25 PROCEDURE — 93010 ELECTROCARDIOGRAM REPORT: CPT | Performed by: INTERNAL MEDICINE

## 2022-03-25 PROCEDURE — 93005 ELECTROCARDIOGRAM TRACING: CPT | Performed by: NURSE PRACTITIONER

## 2022-03-25 PROCEDURE — 6360000002 HC RX W HCPCS: Performed by: FAMILY MEDICINE

## 2022-03-25 PROCEDURE — 80048 BASIC METABOLIC PNL TOTAL CA: CPT

## 2022-03-25 PROCEDURE — 6370000000 HC RX 637 (ALT 250 FOR IP): Performed by: FAMILY MEDICINE

## 2022-03-25 PROCEDURE — 36415 COLL VENOUS BLD VENIPUNCTURE: CPT

## 2022-03-25 PROCEDURE — 6370000000 HC RX 637 (ALT 250 FOR IP): Performed by: NURSE PRACTITIONER

## 2022-03-25 PROCEDURE — 83735 ASSAY OF MAGNESIUM: CPT

## 2022-03-25 PROCEDURE — 99233 SBSQ HOSP IP/OBS HIGH 50: CPT | Performed by: INTERNAL MEDICINE

## 2022-03-25 PROCEDURE — 2580000003 HC RX 258: Performed by: FAMILY MEDICINE

## 2022-03-25 PROCEDURE — 2140000000 HC CCU INTERMEDIATE R&B

## 2022-03-25 RX ADMIN — APIXABAN 2.5 MG: 2.5 TABLET, FILM COATED ORAL at 09:35

## 2022-03-25 RX ADMIN — DOFETILIDE 125 MCG: 0.12 CAPSULE ORAL at 05:54

## 2022-03-25 RX ADMIN — LEVOTHYROXINE SODIUM 112 MCG: 0.11 TABLET ORAL at 05:54

## 2022-03-25 RX ADMIN — DILTIAZEM HYDROCHLORIDE 120 MG: 120 CAPSULE, COATED, EXTENDED RELEASE ORAL at 09:33

## 2022-03-25 RX ADMIN — BUDESONIDE 500 MCG: 0.5 SUSPENSION RESPIRATORY (INHALATION) at 20:25

## 2022-03-25 RX ADMIN — METOPROLOL SUCCINATE 75 MG: 50 TABLET, EXTENDED RELEASE ORAL at 21:16

## 2022-03-25 RX ADMIN — APIXABAN 2.5 MG: 2.5 TABLET, FILM COATED ORAL at 21:16

## 2022-03-25 RX ADMIN — SODIUM CHLORIDE, PRESERVATIVE FREE 10 ML: 5 INJECTION INTRAVENOUS at 21:18

## 2022-03-25 RX ADMIN — FUROSEMIDE 10 MG: 20 TABLET ORAL at 09:33

## 2022-03-25 RX ADMIN — PRAVASTATIN SODIUM 40 MG: 20 TABLET ORAL at 09:34

## 2022-03-25 RX ADMIN — METOPROLOL SUCCINATE 75 MG: 50 TABLET, EXTENDED RELEASE ORAL at 09:34

## 2022-03-25 RX ADMIN — BUDESONIDE 500 MCG: 0.5 SUSPENSION RESPIRATORY (INHALATION) at 10:06

## 2022-03-25 RX ADMIN — SODIUM CHLORIDE, PRESERVATIVE FREE 10 ML: 5 INJECTION INTRAVENOUS at 09:35

## 2022-03-25 RX ADMIN — DOFETILIDE 125 MCG: 0.12 CAPSULE ORAL at 17:59

## 2022-03-25 ASSESSMENT — PAIN SCALES - GENERAL
PAINLEVEL_OUTOF10: 0

## 2022-03-25 NOTE — PROGRESS NOTES
Noted pt has flat, red rash-like areas to pam hips/flank areas and tops of thighs. Pt stated it began itching last night. Dr Vester Dakin and Kindra Ellis NP notified.

## 2022-03-25 NOTE — PROGRESS NOTES
Send message via Perfect Serve to Dr Jackline Fleischer ,  Dr Tone Lara on call .   Called for a picture of EKG to be sent to [phone] via text  Picture taken of KG and sent to the number requested meesage shows Delivered

## 2022-03-25 NOTE — PROGRESS NOTES
Hospitalist Progress Note      SYNOPSIS: Patient admitted on 3/21/2022 for Atrial fibrillation with rapid ventricular response [facility])    Chief complaint=  Patient confirms chief complaint for this hospital admission stating she felt she could not breathe  SUBJECTIVE:    Patient seen and examined  Records reviewed. Review of systems  General= patient states she feels well overall  Cardiac= denies chest pain  Pulmonary= denies difficulty with breathing    GI= denies diarrhea denies abdominal pain        Temp (24hrs), Av.1 °F (36.2 °C), Min:96.2 °F (35.7 °C), Max:98.5 °F (36.9 °C)    DIET: ADULT DIET;  Regular  CODE: Full Code    Intake/Output Summary (Last 24 hours) at 3/25/2022 1646  Last data filed at 3/25/2022 1354  Gross per 24 hour   Intake 360 ml   Output 575 ml   Net -215 ml       OBJECTIVE: 100% SPO2    BP (!) 109/55   Pulse 75   Temp 97.6 °F (36.4 °C) (Temporal)   Resp 18   Ht 5' 5\" (1.651 m)   Wt 169 lb (76.7 kg)   SpO2 100%   BMI 28.12 kg/m²     General appearance: Elderly not in extremis     Respiratory: Comfortable resp pattern at rest     Skin: No petechiae  Neurologic: Awake and alert  Mood and affect and thinking are normal  Language intact  Patient can tell me her full name as well as today's date and current location  No noticeable tremor in her upper extremities  No dysarthria or aphasia    ASSESSMENT:    A. fib   Pacemaker  Hypothyroid unspecified  Chronic kidney disease stage III  Hyperlipidemia unspecified  Chronic COPD-currently not in exacerbation  Copd class/Stage unclear/  QTC prolonged raising concern that certain meds may be contributing    -phenerga     Brovana/aformeterol  QTc 498 1146 3/24  558  0817      PLAN:per ep  Tikosyn 125mcg every 12 Hr    Eliquis 2.5mg BID    Toprol 75mg BID and Cardizem 120mg daily       Continue current dose of levothyroxine  Avoid nephrotoxic agents  Continue pravastatin for hyperlipidemia  Continue  incentive spirome and budesonide  for COPD  DISPOSITION: Patient lives at home    Medications:  REVIEWED DAILY    Infusion Medications    sodium chloride       Scheduled Medications    dofetilide  125 mcg Oral 2 times per day    metoprolol succinate  75 mg Oral BID    dilTIAZem  120 mg Oral Daily    magnesium sulfate  2,000 mg IntraVENous Once    apixaban  2.5 mg Oral BID    furosemide  10 mg Oral Daily    levothyroxine  112 mcg Oral QAM AC    pravastatin  40 mg Oral Daily    sodium chloride flush  10 mL IntraVENous 2 times per day    budesonide  0.5 mg Nebulization BID     PRN Meds: sodium chloride flush, sodium chloride, promethazine **OR** [DISCONTINUED] ondansetron, polyethylene glycol    Labs:     No results for input(s): WBC, HGB, HCT, PLT in the last 72 hours. Recent Labs     03/23/22  1122 03/24/22  0531 03/25/22  0525    143 140   K 3.8 4.4 4.2    105 104   CO2 22 25 26   BUN 20 16 16   CREATININE 1.3* 1.3* 1.4*   CALCIUM 9.2 9.0 8.9             Chronic labs:      Radiology: none today  +++++++++++++++++++++++++++++++++++++++++++++++++  DO Evelio Barahona Physician - [address] [place] [address]  +++++++++++++++++++++++++++++++++++++++++++++++++  NOTE: This report was transcribed using voice recognition software. Every effort was made to ensure accuracy; however, inadvertent computerized transcription errors may be present.

## 2022-03-25 NOTE — TELEPHONE ENCOUNTER
----- Message from Uzma Menjivar, THEO - CNP sent at 3/25/2022  9:10 AM EDT -----  Awa Huntley was started on Tikosyn 125mcg every 12 hours inpatient she is known to Dr. Oral Koehler and will need follow-up EKG in one week and with a provider in 1 month. Thanks.

## 2022-03-25 NOTE — PROGRESS NOTES
[address] ELECTROPHYSIOLOGY DEPARTMENT/DIVISION OF CARDIOLOGY  Inpatient Progress Note. PATIENT: Nery Benoit  MEDICAL RECORD NUMBER: [id number]  DATE OF SERVICE:  3/25/2022   ATTENDING ELECT  PRIMARY ELECTROPHYSIOLOGIST:  Kecia Nathan MD  ATTENDING ELECTROPHYSIOLOGIST: Nick Schofield MD   REFERRING PHYSICIAN:  Gege Mullen  CHIEF COMPLAINT: Atrial fibrillation    HPI: Nery Benoit is a 81 y.o. female with a history of nonvalvular persistent AF sp DCCV (6/13/2019 and 3/21/2022), HFpEF-recovered/nonischemic sp [employer] CRT-P (dual-chamber PPM DOI: 5/4/2012; CRT-P upgrade: 3/11/2019), CAD sp stent RCA (2017), HTN, CKD 3/4, spinal stenosis, and hypothyroidism. She is managed by Maia Chisholm and Toni Rodriguez with apixaban 2.5 mg twice daily, Lasix 10 mg daily, metoprolol  mg twice daily, and Pravachol 40 mg daily. In 2012, she was reportedly diagnosed with intermittent AV block and treated with a [employer] dual-chamber pacemaker. She was diagnosed with atrial fibrillation at some point prior to 2013. She reportedly was intolerant to amiodarone in the past due to elevated LFTs this was confiremed by her Daughter. In 2019, she transferred EP care from outside hospital to [address] [place] [address], Dr Toni Rodriguez. At that time she was noted to have LVEF of 45% with RV pacing greater than 40%, which was treated with upgrade of dual-chamber pacemaker to CRT P. In June 2019, she had a recurrence of AF around this time and was treated with direct-current cardioversion and sotalol 120 mg daily. In November 2019, patient discontinued sotalol on her own. In the past, discussion of AVJ ablation was had between establish EP and patient/family and if she were to develop increased AF/AT burden with reduction in BiV pacing. She is enrolled in remote monitoring which is reported reduction in BiV pacing to 44% and in AF burden of approximately 50 to 60% with episodes of RVR.   This was treated with titration of beta-blockade. On 3/21/2022, she presented with chief complaint of dyspnea. She was diagnosed with AF with RVR (121 bpm). A direct-current cardioversion was performed, which reportedly restored sinus rhythm for a brief period of time prior to return of AF. She spontaneously converted to sinus on hospital day one yet continues to have paroxysmal of atrial fibrillation. Her EKG showed a QTc of 541 on the 22nd and on 03/23 showed a QTc of 493ms with Bi-V pacing. She was started on Tikosyn 125mcg Every 12 hours and her QTc today is 538ms. EP service is now consulted by admitting physician for evaluation management of AF. Patient denies any other complaints at this time. 03/25/22: She has completed 4 of 6 monitored doses without QTc prolongation the luke assessment of her QTc was 514ms this morning, her CrCl is  26.44mL/min, she remains AP-BiV paced and is without cardiac complaints. Prior cardiac testing:  · ECG (03/25/22): AP- rate 75 bpm, QRS 134ms  QTc 514   · ECG (03/24/2022): AP- 75 bpm, QTc 538  · ECG (03/23/22): AP- rate 75 bpm,  QTc 493ms. · ECG (4/28/2021): A paced-V paced at 76 bpm, QTC =396 ms. · Pharmacologic nuclear stress test (6/12/2020): LVEF =79%, normal perfusion with apical attenuation artifact. · TTE (1/28/2020): LVEF =55 to 60%, mild to moderate concentric LVH, severe CHRISTINE, mild MR, and moderate TR. · Pharmacologic nuclear stress test (6/12/2019): LVEF =70% with mild inferior hypokinesis and apical dyskinesia, no ischemia. · TTE (4/10/2017): LVEF =70%, mild LAE  · LHC (6/26/2017): RCA dominant circulation 80% mid RCA stenosis treated with DANE x1.  · SANGITA (6/23/2017): LVEF = \"grossly normal\", severe LAE, no LA/KATE thrombus, moderate MR, moderate TR. · Pharmacologic nuclear stress test (6/3/2017): LVEF =77%, attenuation artifact of the inferior wall secondary to hepatic uptake, no ischemia.   · TTE (6/3/2017): LVEF =65%, borderline concentric LVH, severe LAE, moderate NELA, moderate MR, moderate TR, mild pulmonary hypertension.     Past Medical History:   Diagnosis Date    Atrial fibrillation ([address]     Heart palpitations     History of kidney problems     History of stress test     Hyperlipidemia     Hypertension     Hypothyroidism     Vertigo      Past Surgical History:   Procedure Laterality Date    CARDIOVERSION  06/13/2019    Dr. Luis Palomares Right 06/26/2017    Dr Mark Rear Right     knee    PACEMAKER INSERTION  05/04/2012    D-PPM  Dr. Iram Bal  03/11/2019    UPGRADE D-PPM TO BIV PPM / NEW LV LEAD   (Vera Quintana)   DR. Reba Nayak     SPINE SURGERY      lumbar lameinectomy with screw stablization    TRANSESOPHAGEAL ECHOCARDIOGRAM  06/23/2017    Dr. Teena Hernández SANGITA     UPPER GASTROINTESTINAL ENDOSCOPY          Current Facility-Administered Medications   Medication Dose Route Frequency Provider Last Rate Last Admin    dofetilide (TIKOSYN) capsule 125 mcg  125 mcg Oral 2 times per day Ludwin Dolores, APRN - CNP   125 mcg at 03/25/22 0554    metoprolol succinate (TOPROL XL) extended release tablet 75 mg  75 mg Oral BID Ludwin Dolores, APRN - CNP   75 mg at 03/24/22 2148    dilTIAZem (CARDIZEM CD) extended release capsule 120 mg  120 mg Oral Daily Ludwin Dolores, APRN - CNP   120 mg at 03/24/22 1661    magnesium sulfate 2000 mg in 50 mL IVPB premix  2,000 mg IntraVENous Once Luane Bugler, DO        apixaban Sara Viviana) tablet 2.5 mg  2.5 mg Oral BID Luane Bugler, DO   2.5 mg at 03/24/22 2148    furosemide (LASIX) tablet 10 mg  10 mg Oral Daily Luane Bugler, DO   10 mg at 03/24/22 6343    levothyroxine (SYNTHROID) tablet 112 mcg  112 mcg Oral QAM AC Luane Bugler, DO   112 mcg at 03/25/22 0525    pravastatin (PRAVACHOL) tablet 40 mg  40 mg Oral Daily Luane Bugler, DO   40 mg at 03/24/22 2084  sodium chloride flush 0.9 % injection 10 mL  10 mL IntraVENous 2 times per day Sammie Sabal, DO   10 mL at 03/24/22 2204    sodium chloride flush 0.9 % injection 10 mL  10 mL IntraVENous PRN Sammie Sabal, DO        0.9 % sodium chloride infusion  25 mL IntraVENous PRN Sammie Sabal, DO        promethazine (PHENERGAN) tablet 12.5 mg  12.5 mg Oral Q6H PRN Sammie Sabal, DO        polyethylene glycol (GLYCOLAX) packet 17 g  17 g Oral Daily PRN Sammie Sabal, DO        budesonide (PULMICORT) nebulizer suspension 500 mcg  0.5 mg Nebulization BID Sammie Sabal, DO   500 mcg at 03/24/22 1938        Allergies   Allergen Reactions    Acetaminophen      Other reaction(s): UPSET STOMACH AND NAUSEA    Amiodarone Other (See Comments)     Causes issues with liver enzymes.  Hydrocodone      Other reaction(s): UPSET STOMACH AND NAUSEA       ROS:   Constitutional: Negative for fever, activity change and appetite change. HENT: Negative for epistaxis. Eyes: Negative for diploplia, blurred vision. Respiratory: Negative for cough, chest tightness, shortness of breath and wheezing. Cardiovascular: pertinent positives in HPI  Gastrointestinal: Negative for abdominal pain and blood in stool. Genitourinary: Negative for hematuria and difficulty urinating. Musculoskeletal: Negative for myalgias and gait problem. Skin: Negative for color change and rash. Neurological: Negative for syncope and light-headedness. Psychiatric/Behavioral: Negative for confusion and agitation. The patient is not nervous/anxious.   Heme: no bleeding disorders, no melena or hematochezia  All other review of systems are negative     PHYSICAL EXAM:  Vitals:    03/24/22 2148 03/24/22 2345 03/25/22 0421 03/25/22 0800   BP: (!) 112/55 (!) 114/55 103/60 109/78   Pulse: 73 75 76 75   Resp:  16 16 18   Temp:  96.2 °F (35.7 °C) 96.6 °F (35.9 °C) 98.5 °F (36.9 °C)   TempSrc:  Temporal Temporal Temporal   SpO2:  96%  98%   Weight:       Height: Constitutional: Well-developed, no acute distress, well groomed  Eyes: conjunctivae normal, no xanthelasma   Ears, Nose, Throat: oral mucosa moist, no cyanosis   Neck: supple, no JVD   CV: RRR, peripheral pulses normal with bilateral radial and pedal pulses   Lungs: normal respiratory effort without used of accessory muscles, no audible wheezes  Abdomen: nondistended  Extremities: no digital clubbing, no edema   Skin: warm, no rashes, CI ED site clean dry intact without erythema edema or drainage. Neuro/Psych: A&O x 3, normal mood and affect    Data:    No results for input(s): WBC, HGB, HCT, PLT in the last 72 hours. Recent Labs     03/23/22  1122 03/24/22  0531 03/25/22  0525    143 140   K 3.8 4.4 4.2    105 104   CO2 22 25 26   BUN 20 16 16   CREATININE 1.3* 1.3* 1.4*   CALCIUM 9.2 9.0 8.9     No results for input(s): INR in the last 72 hours. Recent Labs     03/23/22  1122   TSH 2.810     Lab Results   Component Value Date    MG 2.2 03/25/2022     Telemetry: AP- rate 75 bpm.     EKG 03/25/22: AV paced rate 75 bpm, QRS 134ms QTc 514ms      Device Interrogation/Reprogramming (3/22/22)  · Make/Model: [employer] Allure RF  · DOI: 5/11/19  · Battery: 6.5-7 years  · Jonah Hodgkins therapy: DDDR 75 - 120 bpm, AV delays = 150 msec (sensed), 180 msec (paced)  · Pacing %: RA = 15%, BiV = 22%  · Lead function:  · RA lead: sensing = 2.6 mV, impedance = 430 ohms, threshold = 0.75 V @ 0.5 msec  · RV lead: sensing = 2.3 mV, impedance = 410 ohms, threshold = 1.25 V @ 0.5 msec  · LV lead: sensing = N/A, impedance = 780 ohms, threshold = 1.0 V @ 0.5 msec  · Lead programming:  · RA lead: sensitivity = 0.1 mV, output = 2.5 V @ 0.5 msec  · RV lead: sensitivity = 1.0 mV, output = 2.375 V @ 0.5 msec  · LV lead: sensitivity = N/A, output = 2.0 V @ 0.5 msec  · Arrhythmias: AT/AF burden = 33% since 3/18/22 with episodes of RVR.   · Reprogramming included: none  · Overall device function is normal  · All device programmable settings were evaluated per above and in the scanned document, along with iterative adjustments (capture thresholds) to assess and select the most appropriate final programming to provide for consistent delivery of the appropriate therapy and to verify function of the device. Assessment/plan:    1. Persistent atrial fibrillation  - Asymptomatic.  - [id number] of 5.  - On Toprol XL for rate control.  - Developed anorexia/elevated liver enzymes with Digoxin and Amiodarone in the past.  - Started on Sotalol 120 mg QOD on 6/10/19 and stopped Sotalol by herself in November of 2019  - S/p CV 6/13/19 and 03/21/22  - On Eliquis 2.5 mg bid for stroke risk reduction. (age 81, Wt 82.6, and fluctuation of Cr level)  - Rhythm control preferable due to CRT and HF.   - Prior Intolerance to amiodarone due to elevated LFT.   - QTc prolonged on Brovana, phenergan and improved to 493 ms after offending medications were discontinued   - Recurrent AF with RVR and a decrease in BiV pacing 03/23/22  -  Tikosyn was started on 03/23/22 her QTc today after the second dose is 538 (in setting of BiV pacing) CrCl 26.39 mL/min based on creat of 1.4    - Toprol was reduced from 125 to 75mg BID and Cardizem was started as she does not feel well on high dose BB.   -Modifiable risk factors:  --Obesity: BMI goal less than 27.  --VALE: Consider outpatient sleep study.  --HTN: BP goal less than 130/80  --Alcohol: Recommend avoid    2.  CRT-P in situ  - S/p [employer] Neal dual chamber pacemaker on 5/4/12.  - S/p CRT-P upgrade 3/11/19.  - Normal device function.  - Bi- 95%     3. Coronary artery disease  - S/p PCI of RCA on 6/26/17.  - On Toprol XL and Pravahol.      4. Hypertension  - Well controlled. - On Lasix and Toprol.     5. Hyperlipidemia  - On Pravachol.     6. Hypothyroidism  - On Synthroid.     7. CKD     8. Dizziness   - From orthostatic hypotension   - Resolved     9. PSVT  - On Toprol XL. Plan:   1.  Continue Tikosyn 125mcg every 12 Hr   2. Continue Eliquis 2.5mg BID   3. Continue Toprol 75mg BID and Cardizem 120mg daily. 4. EKG 2 hours after each dose, call for QTc greater than 550ms but not after 2200 or prior to 0600.   5. Daily BMP, Mag. Thank you for allowing me to participate in your patient's care. Please call me if there are any questions. Discussed with Dr. Meera Rogers. Leonie Nam, APRN - CNP   Cardiac Electrophysiology  [address] [place] [address] Physicians    Attending Physician's Statement    Patient seen with the ANP. Agree with the findings, assessment and plan. Management plan was discussed. I have personally interviewed the patient, independently performed a focused cardiac examination, reviewed the pertinent laboratory and diagnostic testing with the patient and directly participated in the medical decision-making as noted above with the following additions:     Feeling well. Remains in NSR. QTc was 514 this morning by manual calculation. Physical exam showed no JVD, RRR, normal S1S2, no murmur, clear lungs bilaterally, no edema    Continue Tikosyn 125 mcg every 12 hours. Continue Toprol XL 75 mg bid. Continue Cardizem 120 mg daily. Continue Eliquis 2.5 mg bid. OK to discharge home tomorrow if QTc is stable     I have spent a total of 35 minutes with the patient and the family reviewing the above stated recommendations. And a total of >50% of that time involved face-to-face time providing counseling and/or coordination of care with the other providers, reviewing records/tests, counseling/education of the patient, ordering medications/tests/procedures, coordinating care, and documenting clinical information in the EHR.     Eli Jc MD  Cardiac Electrophysiology  [address]  [facility]  3:34 PM  3/25/2022

## 2022-03-25 NOTE — PROGRESS NOTES
Hospitalist Progress Note      SYNOPSIS: Patient admitted on 3/21/2022 for Atrial fibrillation with RVR [facility])    Chief complaint=  Patient confirms chief complaint for this hospital admission stating she felt she could not breathe  SUBJECTIVE:    Patient seen and examined  Records reviewed. Review of systems  General= patient states she feels well overall  Cardiac= denies chest pain  Pulmonary= denies difficulty with breathing    GI= denies diarrhea denies abdominal pain        Temp (24hrs), Av.8 °F (36 °C), Min:96.5 °F (35.8 °C), Max:97.6 °F (36.4 °C)    DIET: ADULT DIET;  Regular  CODE: Full Code    Intake/Output Summary (Last 24 hours) at 3/24/2022 2015  Last data filed at 3/24/2022 1449  Gross per 24 hour   Intake 390 ml   Output 750 ml   Net -360 ml       OBJECTIVE: 98% SPO2    BP (!) 99/55   Pulse 75   Temp 96.6 °F (35.9 °C) (Temporal)   Resp 16   Ht 5' 5\" (1.651 m)   Wt 169 lb (76.7 kg)   SpO2 95%   BMI 28.12 kg/m²     General appearance: Elderly not in extremis     Respiratory: Comfortable resp pattern at rest     Skin: No petechiae  Neurologic: Awake and alert  Mood and affect and thinking are normal  Language intact  Patient can tell me her full name as well as today's date and current location  No noticeable tremor in her upper extremities  No dysarthria or aphasia    ASSESSMENT:    A. fib   Pacemaker  Hypothyroid unspecified  Chronic kidney disease stage III  Hyperlipidemia unspecified  Chronic COPD-currently not in exacerbation  Copd class/Stage unclear/  QTC prolonged raising concern that certain meds may be contributing    -phenerga     Brovana/aformeterol  QTc 498 0616 3/24  558  0817      PLAN:per ep   tikosyn 125mg po bid    Needs to be in the hospital 3 days in hospit telemet monitoring  Patient expected to be here until Saturday    Continue current dose of levothyroxine  Avoid nephrotoxic agents  Monitor serum potassium  Check magnesium level  Continue pravastatin for hyperlipidemia  Continue  incentive spirome and budesonide  for COPD  DISPOSITION: Patient lives at home    Medications:  REVIEWED DAILY    Infusion Medications    sodium chloride       Scheduled Medications    dofetilide  125 mcg Oral 2 times per day    metoprolol succinate  75 mg Oral BID    dilTIAZem  120 mg Oral Daily    magnesium sulfate  2,000 mg IntraVENous Once    apixaban  2.5 mg Oral BID    furosemide  10 mg Oral Daily    levothyroxine  112 mcg Oral QAM AC    pravastatin  40 mg Oral Daily    sodium chloride flush  10 mL IntraVENous 2 times per day    budesonide  0.5 mg Nebulization BID     PRN Meds: sodium chloride flush, sodium chloride, promethazine **OR** [DISCONTINUED] ondansetron, polyethylene glycol    Labs:     Recent Labs     03/22/22  0520   WBC 9.1   HGB 12.5   HCT 38.2          Recent Labs     03/22/22  0519 03/23/22  1122 03/24/22  0531    139 143   K 3.6 3.8 4.4    103 105   CO2 20* 22 25   BUN 23 20 16   CREATININE 1.4* 1.3* 1.3*   CALCIUM 9.0 9.2 9.0             Chronic labs:      Radiology: none today  +++++++++++++++++++++++++++++++++++++++++++++++++  Lewis Ruano DO  [address]  +++++++++++++++++++++++++++++++++++++++++++++++++  NOTE: This report was transcribed using voice recognition software. Every effort was made to ensure accuracy; however, inadvertent computerized transcription errors may be present.

## 2022-03-26 VITALS
WEIGHT: 169 LBS | SYSTOLIC BLOOD PRESSURE: 117 MMHG | TEMPERATURE: 96.6 F | HEART RATE: 75 BPM | OXYGEN SATURATION: 100 % | RESPIRATION RATE: 18 BRPM | HEIGHT: 65 IN | DIASTOLIC BLOOD PRESSURE: 78 MMHG | BODY MASS INDEX: 28.16 KG/M2

## 2022-03-26 LAB
ANION GAP SERPL CALCULATED.3IONS-SCNC: 12 MMOL/L (ref 7–16)
BUN BLDV-MCNC: 18 MG/DL (ref 6–23)
CALCIUM SERPL-MCNC: 9.1 MG/DL (ref 8.6–10.2)
CHLORIDE BLD-SCNC: 104 MMOL/L (ref 98–107)
CO2: 25 MMOL/L (ref 22–29)
CREAT SERPL-MCNC: 1.3 MG/DL (ref 0.5–1)
GFR AFRICAN AMERICAN: 47
GFR NON-AFRICAN AMERICAN: 39 ML/MIN/1.73
GLUCOSE BLD-MCNC: 96 MG/DL (ref 74–99)
MAGNESIUM: 2 MG/DL (ref 1.6–2.6)
POTASSIUM SERPL-SCNC: 4.8 MMOL/L (ref 3.5–5)
SODIUM BLD-SCNC: 141 MMOL/L (ref 132–146)

## 2022-03-26 PROCEDURE — 83735 ASSAY OF MAGNESIUM: CPT

## 2022-03-26 PROCEDURE — 6370000000 HC RX 637 (ALT 250 FOR IP): Performed by: FAMILY MEDICINE

## 2022-03-26 PROCEDURE — 80048 BASIC METABOLIC PNL TOTAL CA: CPT

## 2022-03-26 PROCEDURE — 6360000002 HC RX W HCPCS: Performed by: FAMILY MEDICINE

## 2022-03-26 PROCEDURE — 6370000000 HC RX 637 (ALT 250 FOR IP): Performed by: NURSE PRACTITIONER

## 2022-03-26 PROCEDURE — 36415 COLL VENOUS BLD VENIPUNCTURE: CPT

## 2022-03-26 PROCEDURE — 99233 SBSQ HOSP IP/OBS HIGH 50: CPT | Performed by: INTERNAL MEDICINE

## 2022-03-26 PROCEDURE — 94640 AIRWAY INHALATION TREATMENT: CPT

## 2022-03-26 PROCEDURE — 2580000003 HC RX 258: Performed by: FAMILY MEDICINE

## 2022-03-26 RX ORDER — DILTIAZEM HYDROCHLORIDE 120 MG/1
120 CAPSULE, COATED, EXTENDED RELEASE ORAL DAILY
Qty: 30 CAPSULE | Refills: 3 | Status: SHIPPED | OUTPATIENT
Start: 2022-03-27 | End: 2022-06-03

## 2022-03-26 RX ORDER — METOPROLOL SUCCINATE 25 MG/1
75 TABLET, EXTENDED RELEASE ORAL 2 TIMES DAILY
Qty: 180 TABLET | Refills: 3 | Status: SHIPPED | OUTPATIENT
Start: 2022-03-26 | End: 2022-06-17 | Stop reason: DRUGHIGH

## 2022-03-26 RX ORDER — FUROSEMIDE 20 MG/1
10 TABLET ORAL DAILY
Qty: 30 TABLET | Refills: 3 | Status: SHIPPED | OUTPATIENT
Start: 2022-03-27

## 2022-03-26 RX ORDER — PREDNISONE 20 MG/1
20 TABLET ORAL 2 TIMES DAILY
Qty: 6 TABLET | Refills: 0 | Status: SHIPPED | OUTPATIENT
Start: 2022-03-26 | End: 2022-03-29

## 2022-03-26 RX ORDER — DOFETILIDE 0.12 MG/1
125 CAPSULE ORAL EVERY 12 HOURS SCHEDULED
Qty: 60 CAPSULE | Refills: 3 | Status: SHIPPED | OUTPATIENT
Start: 2022-03-26 | End: 2022-07-11 | Stop reason: SDUPTHER

## 2022-03-26 RX ADMIN — METOPROLOL SUCCINATE 75 MG: 50 TABLET, EXTENDED RELEASE ORAL at 08:35

## 2022-03-26 RX ADMIN — DOFETILIDE 125 MCG: 0.12 CAPSULE ORAL at 05:55

## 2022-03-26 RX ADMIN — FUROSEMIDE 10 MG: 20 TABLET ORAL at 08:35

## 2022-03-26 RX ADMIN — BUDESONIDE 500 MCG: 0.5 SUSPENSION RESPIRATORY (INHALATION) at 08:13

## 2022-03-26 RX ADMIN — LEVOTHYROXINE SODIUM 112 MCG: 0.11 TABLET ORAL at 05:54

## 2022-03-26 RX ADMIN — SODIUM CHLORIDE, PRESERVATIVE FREE 10 ML: 5 INJECTION INTRAVENOUS at 08:38

## 2022-03-26 RX ADMIN — DILTIAZEM HYDROCHLORIDE 120 MG: 120 CAPSULE, COATED, EXTENDED RELEASE ORAL at 08:35

## 2022-03-26 RX ADMIN — PRAVASTATIN SODIUM 40 MG: 20 TABLET ORAL at 08:35

## 2022-03-26 RX ADMIN — APIXABAN 2.5 MG: 2.5 TABLET, FILM COATED ORAL at 08:35

## 2022-03-26 ASSESSMENT — PAIN SCALES - GENERAL
PAINLEVEL_OUTOF10: 0

## 2022-03-26 NOTE — PROGRESS NOTES
AVS printed and explained with all questions answered. PIVs removed and tele box cleaned and returned to drawer. Patient in possession of all bedside belongings at time of discharge. Patient discharged via wheelchair to care of daughter.

## 2022-03-26 NOTE — DISCHARGE SUMMARY
Hospitalist Discharge Summary    Patient ID: Sally Huntley   Patient : [id number]  Patient's PCP: Jes Ramos    Admit Date: 3/21/2022   Admitting Physician: Natalee Parker DO    Discharge Date:  3/26/2022  Discharge Physician: Sesar Liriano DO   Discharge Condition: Stable  Discharge Disposition: Home    History of presenting illness:    SYNOPSIS: Patient admitted on 3/21/2022 for Atrial fibrillation with rapid ventricular response [facility])    Per admiting hospitalist  Chief Complaint:  had concerns including Atrial Fibrillation (w/ SOB starting this morning, pt daughter sent ppacemaker reading and it showed A. Fib. RVR) and Shortness of Breath.     History Of Present Illness:    Ms. Balwinder Muniz, a 81y.o. year old female  who  has a past medical history of Atrial fibrillation ([address] Heart palpitations, History of kidney problems, History of stress test, Hyperlipidemia, Hypertension, Hypothyroidism, and Vertigo.      Patient presents emergency department with complaints of shortness of breath and tachycardia. Patient has a history of atrial fibrillation and a pacemaker. Pacemaker was interrogated 10 days ago and revealed atrial fibrillation with RVR. Patient had beta-blocker increased at that time but symptoms persisted. Patient reporting heart rate in 150s. Also with shortness of breath. Cardiology was consulted in the emergency department and they advised cardioversion. This was performed and worked temporarily but ultimately patient went back into atrial fibrillation with RVR.   She will be admitted for further evaluation and treatment         Hospital course in brief:  (Please refer to daily progress notes for a comprehensive review of the hospitalization by requesting medical records)    I began to follow the patient's course on  please see p[rogress notes for details    Chief complaint=  Patient confirms chief complaint for this hospital admission stating she felt she could not breathe    Evaluated by ep cardiologist  List of diagnosis confirmed       A. fib rate controlled/NVAFIB  CHADSVASC2 -5   Pacemaker/CRT-P in situ [employer] Neal dual chamber   intermitt AV block  HFpEF  htn  cad  Hypothyroid unspecified  Chronic kidney disease stage III  Hyperlipidemia unspecified  Chronic COPD-currently not in exacerbation  Copd class/Stage unclear/  QTC prolonged raising concern that certain meds may be contributing    -phenerga     Brovana/aformeterol  QTc 498 1146 3/24  558  0817 03/24   3/26 based on EPS manual calculation  Spinal stenosis    Due to hx of  cv   lft elevation 2/2 amiodarone  Prior experience and patient  self discontinuation of sotalol  After discussion with patient/and her family  Tikosyn was the treatment of choice decided upon by ep cardiolg and patient and family-based on chart documentation)  eps also reduced dose of toprol from 125 mg to 75 mg po bid (25 mg tabs 3 tabs bid) and diltiazem initiated as patient had reported not feeling well on the higher dose of BB  rtyhm controlled preferred due to underlyong CRT and HF    Her bp remained well controlled in the hospital  Her QTc were monitored in the hospital by EKG during the Tikosyn initiaitng phase ( cardiolog/ep had advised 3 day stay in  The hospital during the [address] initiating phase)    Remainder of stay in the hospital was uneventful and routine  On evening of 3/25 rn reported that patient developed somne type of outbreak rash  Her skin eruption was examined on 3/26  Determined to be a morbilliform eruption/ exanthemaous maculopapular as there were numerous erythematous macules    I notified EPs of my impression  Possibly a manifestation of drug eruption  Without pruritus and without mucosal involvemnt  As her condition of afib is considereable  eps did inform me he felt overall agreeable with my recommendation for steroid use but with notice that risk of afib higher   However if it was needed to proceed  It was felt reasonable to treat thru with accompanying systemic steroid short course 3 days 0.5 mg//kg bid  And patient notified to monitor for the following symptoms and to seek medical; care if the following to develop         These include high fever, facial swelling (edema), mucosal symptoms, skin tenderness, development of pustules or blisters, and jaundice   Ep cleared patient for discharge  \" OK to discharge home per EP perspectives. \"    patient appeared to be at a point where ip hospit stay no longer necessary and was safe to dc to home  I entered the order for dc and had no further contact with the patient  following that    Consults:   IP CONSULT TO ELECTROPHYSIOLOGY  IP CONSULT TO INTERNAL MEDICINE    Discharge Diagnoses:     A. fib rate controlled/NVAFIB  CHADSVASC2 -5   Pacemaker/CRT-P in situ St Neal dual chamber   intermitt AV block  HFpEF  htn  cad  Hypothyroid unspecified  Chronic kidney disease stage III  Hyperlipidemia unspecified  Chronic COPD-currently not in exacerbation  Copd class/Stage unclear/  QTC prolonged raising concern that certain meds may be contributing    -phenerga     Brovana/aformeterol  QTc 498 1146 3/24  558  0817 03/24   3/26 calculated manually by eps   when I calculate manually with KRYS it also comes out to 536  Dizziness 2/2 orthostatic hypotension      Discharge Instructions / Follow up:  Ep recommended  Continue Tikosyn 125 mcg every 12 hours. 2. Continue Toprol XL 75 mg bid. 3. Continue Cardizem 120 mg daily. 4. Continue Eliquis 2.5 mg bid. 5. OK to discharge home per EP perspectives. Follow up in 1 week for EKG and 1 month with provider.     I recommended prednisone 0.5 mg.kg bid x 3 days to treat exanthemous  maculopapular eruption without mucosal involvement or fever    Continued appropriate risk factor modification of blood pressure, diabetes and serum lipids will remain essential to reducing risk of future atherosclerotic development    Activity: changed: See the new instructions. metoprolol succinate 25 MG extended release tablet  Commonly known as: TOPROL XL  Take 3 tablets by mouth 2 times daily  What changed:   · medication strength  · how much to take  · Another medication with the same name was removed. Continue taking this medication, and follow the directions you see here. CONTINUE taking these medications    levothyroxine 112 MCG tablet  Commonly known as: SYNTHROID     LORazepam 0.5 MG tablet  Commonly known as: ATIVAN     pravastatin 40 MG tablet  Commonly known as: PRAVACHOL  TAKE 1 TABLET BY MOUTH DAILY     VITAMIN D PO        STOP taking these medications    Advair Diskus 250-50 MCG/DOSE Aepb  Generic drug: fluticasone-salmeterol           Where to Get Your Medications      These medications were sent to [address] [place] [address] [phone] - f [phone]  [address] [place] [id number]    Phone: [phone]   · apixaban 2.5 MG Tabs tablet  · budesonide 90 MCG/ACT Aepb inhaler  · dilTIAZem 120 MG extended release capsule  · dofetilide 125 MCG capsule  · furosemide 20 MG tablet  · metoprolol succinate 25 MG extended release tablet  · predniSONE 20 MG tablet         Time Spent on discharge is more than 35 minutes in the examination, evaluation, counseling and review of medications and discharge plan.    +++++++++++++++++++++++++++++++++++++++++++++++++  DO EDITH Roa/ Beto [address]  +++++++++++++++++++++++++++++++++++++++++++++++++  NOTE: This report was transcribed using voice recognition software. Every effort was made to ensure accuracy; however, inadvertent computerized transcription errors may be present.

## 2022-03-26 NOTE — PROGRESS NOTES
[address] ELECTROPHYSIOLOGY DEPARTMENT/DIVISION OF CARDIOLOGY  Inpatient Progress Note. PATIENT: Rosa Maria Rojas  MEDICAL RECORD NUMBER: [id number]  DATE OF SERVICE:  3/26/2022   ATTENDING ELECT  PRIMARY ELECTROPHYSIOLOGIST:  Richa Echeverria MD  ATTENDING ELECTROPHYSIOLOGIST: Carmen Muro MD   REFERRING PHYSICIAN:  Genoveva Narvaez  CHIEF COMPLAINT: Atrial fibrillation    HPI: Rosa Maria Rojas is a 81 y.o. female with a history of nonvalvular persistent AF sp DCCV (6/13/2019 and 3/21/2022), HFpEF-recovered/nonischemic sp [employer] Neal CRT-P (dual-chamber PPM DOI: 5/4/2012; CRT-P upgrade: 3/11/2019), CAD sp stent RCA (2017), HTN, CKD 3/4, spinal stenosis, and hypothyroidism. She is managed by Maia Hawk and Alex Lemons with apixaban 2.5 mg twice daily, Lasix 10 mg daily, metoprolol  mg twice daily, and Pravachol 40 mg daily. In 2012, she was reportedly diagnosed with intermittent AV block and treated with a [employer] Neal dual-chamber pacemaker. She was diagnosed with atrial fibrillation at some point prior to 2013. She reportedly was intolerant to amiodarone in the past due to elevated LFTs this was confiremed by her Daughter. In 2019, she transferred EP care from outside hospital to [facility], Dr Alex Lemons. At that time she was noted to have LVEF of 45% with RV pacing greater than 40%, which was treated with upgrade of dual-chamber pacemaker to CRT P. In June 2019, she had a recurrence of AF around this time and was treated with direct-current cardioversion and sotalol 120 mg daily. In November 2019, patient discontinued sotalol on her own. In the past, discussion of AVJ ablation was had between establish EP and patient/family and if she were to develop increased AF/AT burden with reduction in BiV pacing. She is enrolled in remote monitoring which is reported reduction in BiV pacing to 44% and in AF burden of approximately 50 to 60% with episodes of RVR.   This was treated with titration of beta-blockade. On 3/21/2022, she presented with chief complaint of dyspnea. She was diagnosed with AF with RVR (121 bpm). A direct-current cardioversion was performed, which reportedly restored sinus rhythm for a brief period of time prior to return of AF. She spontaneously converted to sinus on hospital day one yet continues to have paroxysmal of atrial fibrillation. Her EKG showed a QTc of 541 on the 22nd and on 03/23 showed a QTc of 493ms with Bi-V pacing. She was started on Tikosyn 125mcg Every 12 hours and her QTc today is 538ms. EP service is now consulted by admitting physician for evaluation management of AF. Patient denies any other complaints at this time. 03/25/22: She has completed 4 of 6 monitored doses without QTc prolongation the luke assessment of her QTc was 514ms this morning, her CrCl is  26.44mL/min, she remains AP-BiV paced and is without cardiac complaints. 3/26/22: The patient I seen in the hospital for follow up. She denies any cardiac symptoms. QTc was 536 ms by manual calculation today    Prior cardiac testing:  · ECG (03/25/22): AP- rate 75 bpm, QRS 134ms  QTc 514   · ECG (03/24/2022): AP- 75 bpm, QTc 538  · ECG (03/23/22): AP- rate 75 bpm,  QTc 493ms. · ECG (4/28/2021): A paced-V paced at 76 bpm, QTC =396 ms. · Pharmacologic nuclear stress test (6/12/2020): LVEF =79%, normal perfusion with apical attenuation artifact. · TTE (1/28/2020): LVEF =55 to 60%, mild to moderate concentric LVH, severe CHRISTINE, mild MR, and moderate TR. · Pharmacologic nuclear stress test (6/12/2019): LVEF =70% with mild inferior hypokinesis and apical dyskinesia, no ischemia. · TTE (4/10/2017): LVEF =70%, mild LAE  · LHC (6/26/2017): RCA dominant circulation 80% mid RCA stenosis treated with DANE x1.  · SANGITA (6/23/2017): LVEF = \"grossly normal\", severe LAE, no LA/KATE thrombus, moderate MR, moderate TR.   · Pharmacologic nuclear stress test (6/3/2017): LVEF =77%, attenuation artifact of the inferior wall secondary to hepatic uptake, no ischemia. · TTE (6/3/2017): LVEF =65%, borderline concentric LVH, severe LAE, moderate NELA, moderate MR, moderate TR, mild pulmonary hypertension.     Past Medical History:   Diagnosis Date    Atrial fibrillation ([address]     Heart palpitations     History of kidney problems     History of stress test     Hyperlipidemia     Hypertension     Hypothyroidism     Vertigo      Past Surgical History:   Procedure Laterality Date    CARDIOVERSION  06/13/2019    Dr. Caryle Meiers Right 06/26/2017    Dr Piedad Gaytan Right     knee    PACEMAKER INSERTION  05/04/2012    D-PPM  Dr. Gunner Reynolds  03/11/2019    UPGRADE D-PPM TO BIV PPM / NEW LV LEAD   (Suraj Palencia)   DR. Suman Garcia SPINE SURGERY      lumbar lameinectomy with screw stablization    TRANSESOPHAGEAL ECHOCARDIOGRAM  06/23/2017    Dr. Ana María Hawk SANGITA     UPPER GASTROINTESTINAL ENDOSCOPY          Current Facility-Administered Medications   Medication Dose Route Frequency Provider Last Rate Last Admin    Pramoxine-Calamine (CALADRYL) 1-8 % lotion   Topical BID Rl , DO        dofetilide (TIKOSYN) capsule 125 mcg  125 mcg Oral 2 times per day Luz Molina, APRN - CNP   125 mcg at 03/26/22 0555    metoprolol succinate (TOPROL XL) extended release tablet 75 mg  75 mg Oral BID Luz Molina, APRN - CNP   75 mg at 03/25/22 2116    dilTIAZem (CARDIZEM CD) extended release capsule 120 mg  120 mg Oral Daily Luz Molina, APRN - CNP   120 mg at 03/25/22 9713    magnesium sulfate 2000 mg in 50 mL IVPB premix  2,000 mg IntraVENous Once Sami Ott, DO        apixaban Annella Stage) tablet 2.5 mg  2.5 mg Oral BID Sami Tand, DO   2.5 mg at 03/25/22 2116    furosemide (LASIX) tablet 10 mg  10 mg Oral Daily Sami Kand, DO   10 mg at 03/25/22 0933    levothyroxine (SYNTHROID) tablet 112 mcg  112 mcg Oral QAM AC Ardella Amee, DO   112 mcg at 03/26/22 0554    pravastatin (PRAVACHOL) tablet 40 mg  40 mg Oral Daily Ardella Amee, DO   40 mg at 03/25/22 4384    sodium chloride flush 0.9 % injection 10 mL  10 mL IntraVENous 2 times per day Ardella Amee, DO   10 mL at 03/25/22 2118    sodium chloride flush 0.9 % injection 10 mL  10 mL IntraVENous PRN Ardella Amee, DO        0.9 % sodium chloride infusion  25 mL IntraVENous PRN Ardella Amee, DO        promethazine (PHENERGAN) tablet 12.5 mg  12.5 mg Oral Q6H PRN Ardella Amee, DO        polyethylene glycol (GLYCOLAX) packet 17 g  17 g Oral Daily PRN Ardella Amee, DO        budesonide (PULMICORT) nebulizer suspension 500 mcg  0.5 mg Nebulization BID Ardella Amee, DO   500 mcg at 03/25/22 2025        Allergies   Allergen Reactions    Acetaminophen      Other reaction(s): UPSET STOMACH AND NAUSEA    Amiodarone Other (See Comments)     Causes issues with liver enzymes.  Hydrocodone      Other reaction(s): UPSET STOMACH AND NAUSEA       ROS:   Constitutional: Negative for fever, activity change and appetite change. HENT: Negative for epistaxis. Eyes: Negative for diploplia, blurred vision. Respiratory: Negative for cough, chest tightness, shortness of breath and wheezing. Cardiovascular: pertinent positives in HPI  Gastrointestinal: Negative for abdominal pain and blood in stool. Genitourinary: Negative for hematuria and difficulty urinating. Musculoskeletal: Negative for myalgias and gait problem. Skin: Negative for color change and rash. Neurological: Negative for syncope and light-headedness. Psychiatric/Behavioral: Negative for confusion and agitation. The patient is not nervous/anxious.   Heme: no bleeding disorders, no melena or hematochezia  All other review of systems are negative     PHYSICAL EXAM:  Vitals:    03/25/22 2000 03/26/22 0040 03/26/22 0445 03/26/22 0758   BP: (!) 108/55 (!) 111/56 116/66 112/66   Pulse: 75 65 75 75   Resp: 18 18 18    Temp: 97.3 °F (36.3 °C) 97.9 °F (36.6 °C) 98.2 °F (36.8 °C)    TempSrc: Temporal Temporal Temporal    SpO2: 100% 100% 100% 98%   Weight:       Height:       Constitutional: Well-developed, no acute distress, well groomed  Eyes: conjunctivae normal, no xanthelasma   Ears, Nose, Throat: oral mucosa moist, no cyanosis   Neck: supple, no JVD   CV: RRR, peripheral pulses normal with bilateral radial and pedal pulses   Lungs: normal respiratory effort without used of accessory muscles, no audible wheezes  Abdomen: nondistended  Extremities: no digital clubbing, no edema   Skin: warm, no rashes, CI ED site clean dry intact without erythema edema or drainage. Neuro/Psych: A&O x 3, normal mood and affect    Data:    No results for input(s): WBC, HGB, HCT, PLT in the last 72 hours. Recent Labs     03/24/22  0531 03/25/22  0525 03/26/22  0524    140 141   K 4.4 4.2 4.8    104 104   CO2 25 26 25   BUN 16 16 18   CREATININE 1.3* 1.4* 1.3*   CALCIUM 9.0 8.9 9.1     No results for input(s): INR in the last 72 hours. Recent Labs     03/23/22  1122   TSH 2.810     Lab Results   Component Value Date    MG 2.0 03/26/2022     Telemetry 03/26/22: AP- rate 75 bpm, brief PAT. EKG 03/26/22:  AsBiV paced rate 75 bpm, QRS 134ms QTc 547 ms by computer and 536 ms by manual measurement      Device Interrogation/Reprogramming (3/22/22)  · Make/Model: St Neal Allure RF  · DOI: 5/11/19  · Battery: 6.5-7 years  · Carl Annika therapy: DDDR 75 - 120 bpm, AV delays = 150 msec (sensed), 180 msec (paced)  · Pacing %: RA = 15%, BiV = 22%  · Lead function:  · RA lead: sensing = 2.6 mV, impedance = 430 ohms, threshold = 0.75 V @ 0.5 msec  · RV lead: sensing = 2.3 mV, impedance = 410 ohms, threshold = 1.25 V @ 0.5 msec  · LV lead: sensing = N/A, impedance = 780 ohms, threshold = 1.0 V @ 0.5 msec  · Lead programming:  · RA lead: sensitivity = 0.1 mV, output = 2.5 V @ 0.5 msec  · RV lead: sensitivity = 1.0 mV, output = 2.375 V @ 0.5 msec  · LV lead: sensitivity = N/A, output = 2.0 V @ 0.5 msec  · Arrhythmias: AT/AF burden = 33% since 3/18/22 with episodes of RVR. · Reprogramming included: none  · Overall device function is normal  · All device programmable settings were evaluated per above and in the scanned document, along with iterative adjustments (capture thresholds) to assess and select the most appropriate final programming to provide for consistent delivery of the appropriate therapy and to verify function of the device. Assessment/plan:    1. Persistent atrial fibrillation  - Asymptomatic.  - [id number] of 5.  - On Toprol XL for rate control.  - Developed anorexia/elevated liver enzymes with Digoxin and Amiodarone in the past.  - Started on Sotalol 120 mg QOD on 6/10/19 and stopped Sotalol by herself in November of 2019  - S/p CV 6/13/19 and 03/21/22  - On Eliquis 2.5 mg bid for stroke risk reduction. (age 81, Wt 82.6, and fluctuation of Cr level)  - Rhythm control preferable due to CRT and HF.   - Prior Intolerance to amiodarone due to elevated LFT.   - QTc prolonged on Brovana, phenergan and improved to 493 ms after offending medications were discontinued   - Recurrent AF with RVR and a decrease in BiV pacing 03/23/22  -  Tikosyn was started on 03/23/22 her QTc today after the second dose is 538 (in setting of BiV pacing) CrCl 26.39 mL/min based on creat of 1.4    - Toprol was reduced from 125 to 75mg BID and Cardizem was started as she does not feel well on high dose BB.   -Modifiable risk factors:  --Obesity: BMI goal less than 27.  --VALE: Consider outpatient sleep study.  --HTN: BP goal less than 130/80  --Alcohol: Recommend avoid    2.  CRT-P in situ  - S/p St Neal dual chamber pacemaker on 5/4/12.  - S/p CRT-P upgrade 3/11/19.  - Normal device function.  - Bi- 95%     3.  Coronary artery disease  - S/p PCI of RCA on 6/26/17.  - On Toprol XL and Pravahol.      4. Hypertension  - Well controlled. - On Lasix and Toprol.     5. Hyperlipidemia  - On Pravachol.     6. Hypothyroidism  - On Synthroid.     7. CKD     8. Dizziness   - From orthostatic hypotension   - Resolved     9. PSVT  - On Toprol XL. Plan:   1 Continue Tikosyn 125 mcg every 12 hours. 2. Continue Toprol XL 75 mg bid. 3. Continue Cardizem 120 mg daily. 4. Continue Eliquis 2.5 mg bid. 5. OK to discharge home per EP perspectives. Follow up in 1 week for EKG and 1 month with provider. I have spent a total of 35 minutes with the patient and the family reviewing the above stated recommendations. And a total of >50% of that time involved face-to-face time providing counseling and/or coordination of care with the other providers, reviewing records/tests, counseling/education of the patient, ordering medications/tests/procedures, coordinating care, and documenting clinical information in the EHR.     Eri Farley MD  Cardiac Electrophysiology  [facility]) Physicians  [facility] [place]: [facility]  8:00 AM  3/26/2022

## 2022-03-26 NOTE — PROGRESS NOTES
Hospitalist Progress Note      SYNOPSIS: Patient admitted on 3/21/2022 for Atrial fibrillation with rapid ventricular response [facility])    Chief complaint=  Patient confirms chief complaint for this hospital admission stating she felt she could not breathe  Dizziness 2/2 orthostatic hypotension  SUBJECTIVE:  Patient diagnosed with afib rvr   has ppm   has been receiving Tikosyn loading with careful monitoring for symptoms/ and cardiac arrhythmias and QTC    Treatment options for this patient appear to be limited as she has had past drug induced side effects to her blodd work ( GURVINDER 2/2 amiodarone)  Patient self discontin Sotalol according to EPS    rn notif me yesterd evening via PS  ptnt experienced a sudden eruption dermatolog  No reports of pruritus  No involvement of mouth or oral structures  Not painful  Patient seen and examined  Records reviewed. eps evlauted the patient this am and cleared patient for discharge    Review of systems  General= patient states she feels well overall  Derm= eruption  Denies pruritus denies mucosal involvement eyes or mouth denies pain in areas of eruption        Temp (24hrs), Av.5 °F (36.4 °C), Min:96.7 °F (35.9 °C), Max:98.2 °F (36.8 °C)    DIET: ADULT DIET;  Regular  CODE: Full Code    Intake/Output Summary (Last 24 hours) at 3/26/2022 1339  Last data filed at 3/26/2022 1024  Gross per 24 hour   Intake 0 ml   Output 251 ml   Net -251 ml       OBJECTIVE: 100% SPO2    /78   Pulse 75   Temp 96.7 °F (35.9 °C) (Temporal)   Resp 16   Ht 5' 5\" (1.651 m)   Wt 169 lb (76.7 kg)   SpO2 100%   BMI 28.12 kg/m²     General appearance: Elderly not in extremis appears comfortable  Eyes= no conjunctivitis  Heent= no thrush   no oral ulcers no musocal ulcers  Respiratory: Comfortable resp pattern at rest airway patent      Skin: mornilliform eruption on trunk and thighs   morbilliform eruption/ exanthemaous maculopapular as there were numerous erythematous macules    Neurologic: Awake and alert  Mood and affect and thinking are normal  Language intact    ASSESSMENT: morbilliform eruption/ exanthemaous maculopapular as there were numerous erythematous macules  morbilliform eruption   cause unclear / without fever or pain or mucosal involvement    A. fib rate controlled/NVAFIB  CHADSVASC2 -5   Pacemaker/CRT-P in situ [employer] dual chamber   HFpEF  cad  Hypothyroid unspecified  Chronic kidney disease stage III  Hyperlipidemia unspecified  Chronic COPD-currently not in exacerbation  Copd class/Stage unclear/  QTC prolonged raising concern that certain meds may be contributing    -phenerga     Brovana/aformeterol  QTc 498 1146 3/24  558  0817 03/24     PLAN:  For morbilliform eruption  This may be 2/2 medication    As the eruption is extensive it may be reasonable to initiate a limited course of steroid ( 0.5-1mg/kg/qday x 3- 5 days)  Considering age of patient and co morbid condition will dose on lower end and for 3 days  As symptoms are at this time mild it might be reasonable to consider treating thru with accompanying steroid  I have informed the patient to notify her pcp or provider if any of the following  Symptoms to occur    These include high fever, facial swelling (edema), mucosal symptoms, skin tenderness, development of pustules or blisters, and jaundice   per ep  Tikosyn 125mcg every 12 Hr    Eliquis 2.5mg BID    Toprol 75mg BID and Cardizem 120mg daily      Continue current dose of levothyroxine  Avoid nephrotoxic agents  Continue pravastatin for hyperlipidemia  Continue  incentive spirome and budesonide  for COPD-ordered inhaler   brovana discontinued    Dc to home  DISPOSITION: Patient lives at home    Medications:  REVIEWED DAILY    Infusion Medications    sodium chloride       Scheduled Medications    Pramoxine-Calamine   Topical BID    dofetilide  125 mcg Oral 2 times per day    metoprolol succinate  75 mg Oral BID    dilTIAZem  120 mg Oral Daily   

## 2022-03-28 LAB
EKG ATRIAL RATE: 154 BPM
EKG ATRIAL RATE: 75 BPM
EKG P AXIS: 4 DEGREES
EKG P-R INTERVAL: 184 MS
EKG Q-T INTERVAL: 136 MS
EKG Q-T INTERVAL: 490 MS
EKG QRS DURATION: 134 MS
EKG QRS DURATION: 140 MS
EKG QTC CALCULATION (BAZETT): 217 MS
EKG QTC CALCULATION (BAZETT): 547 MS
EKG R AXIS: 167 DEGREES
EKG R AXIS: 27 DEGREES
EKG T AXIS: -6 DEGREES
EKG T AXIS: 0 DEGREES
EKG VENTRICULAR RATE: 154 BPM
EKG VENTRICULAR RATE: 75 BPM

## 2022-03-28 PROCEDURE — 93010 ELECTROCARDIOGRAM REPORT: CPT | Performed by: INTERNAL MEDICINE

## 2022-03-29 DIAGNOSIS — I48.19 PERSISTENT ATRIAL FIBRILLATION (HCC): Primary | ICD-10-CM

## 2022-04-02 DIAGNOSIS — I48.19 PERSISTENT ATRIAL FIBRILLATION (HCC): Chronic | ICD-10-CM

## 2022-04-04 ENCOUNTER — TELEPHONE (OUTPATIENT)
Dept: NON INVASIVE DIAGNOSTICS | Age: 87
End: 2022-04-04

## 2022-04-04 RX ORDER — METOPROLOL SUCCINATE 50 MG/1
TABLET, EXTENDED RELEASE ORAL
Qty: 270 TABLET | Refills: 0 | OUTPATIENT
Start: 2022-04-04

## 2022-04-14 ENCOUNTER — OFFICE VISIT (OUTPATIENT)
Dept: CARDIOLOGY CLINIC | Age: 87
End: 2022-04-14
Payer: MEDICARE

## 2022-04-14 VITALS
WEIGHT: 167 LBS | DIASTOLIC BLOOD PRESSURE: 72 MMHG | SYSTOLIC BLOOD PRESSURE: 128 MMHG | RESPIRATION RATE: 22 BRPM | HEART RATE: 76 BPM | HEIGHT: 67 IN | BODY MASS INDEX: 26.21 KG/M2

## 2022-04-14 DIAGNOSIS — I48.19 PERSISTENT ATRIAL FIBRILLATION (HCC): Primary | ICD-10-CM

## 2022-04-14 PROCEDURE — 1111F DSCHRG MED/CURRENT MED MERGE: CPT | Performed by: INTERNAL MEDICINE

## 2022-04-14 PROCEDURE — G8427 DOCREV CUR MEDS BY ELIG CLIN: HCPCS | Performed by: INTERNAL MEDICINE

## 2022-04-14 PROCEDURE — 93000 ELECTROCARDIOGRAM COMPLETE: CPT | Performed by: INTERNAL MEDICINE

## 2022-04-14 PROCEDURE — G8400 PT W/DXA NO RESULTS DOC: HCPCS | Performed by: INTERNAL MEDICINE

## 2022-04-14 PROCEDURE — 4040F PNEUMOC VAC/ADMIN/RCVD: CPT | Performed by: INTERNAL MEDICINE

## 2022-04-14 PROCEDURE — 1123F ACP DISCUSS/DSCN MKR DOCD: CPT | Performed by: INTERNAL MEDICINE

## 2022-04-14 PROCEDURE — 1036F TOBACCO NON-USER: CPT | Performed by: INTERNAL MEDICINE

## 2022-04-14 PROCEDURE — 99214 OFFICE O/P EST MOD 30 MIN: CPT | Performed by: INTERNAL MEDICINE

## 2022-04-14 PROCEDURE — 1090F PRES/ABSN URINE INCON ASSESS: CPT | Performed by: INTERNAL MEDICINE

## 2022-04-14 PROCEDURE — G8417 CALC BMI ABV UP PARAM F/U: HCPCS | Performed by: INTERNAL MEDICINE

## 2022-04-14 NOTE — PROGRESS NOTES
CHIEF COMPLAINT: CAD/Afib    HISTORY OF PRESENT ILLNESS: Patient is a 81 y.o. female seen at the request of Genoveva Narvaez. PCI to RCA 6/26/17. Some GARG. No CP. Past Medical History:    1. HTN  2. HLD, on statin therapy  3. Hypothyroidism, on statin therapy. 4. Echo: 6/3/2017:  Left ventricular internal dimensions were normal in diastole and systole, borderline LVH, Normal left ventricular ejection fraction, The left atrium is severely dilated, Moderately enlarged right atrium size, Focal calcification mitral valve leaflets, Moderate mitral annular calcification, Moderate mitral regurgitation is present, Moderate tricuspid regurgitation, Pulmonary hypertension is mild . 5. Lexiscan Stress Test: 6/3/2017: EF 77%; No evidence of stress-induced left ventricular myocardial ischemia. 6. History of cholecystectomy, right knee replacement, s/p lumbar lameinectomy  7. Mild Bilateral Carotid Disease: VL Carotid Brent: 6/24/2014:    A. Right internal carotid artery: 0-40%. B. Left internal carotid artery 0-40%. 8. Persistent Afib: [id number] score 4 (HTN, Age, Female). On Eliquis 2.5 mg BID. A. Evaluated by Dr. Osorio Mace: 6/7/2017 resolution of her hyperthyroidism. Once this is occurred, reassessment of a long-term strategy either that of rate control  which if unachievable with medical therapy may require an atrioventricular junctional ablation or that of a rhythm control strategy if persistent symptoms of exertional intolerance or dyspnea are noted which will likely require an alternative antiarrhythmic agent based on toxicity with previous utilized amiodarone. 9. History of Complete Heart Block s/p dual chamber pacemaker ([employer]). Last reported interrogation was on 6/3/2017 (reports requested).      Past Medical History:   Diagnosis Date    Atrial fibrillation (HCC)     Heart palpitations     History of kidney problems     History of stress test  Hyperlipidemia     Hypertension     Hypothyroidism     Vertigo        Patient Active Problem List   Diagnosis    Chest pain    Persistent atrial fibrillation ([facility]    Essential hypertension    HLD (hyperlipidemia)    Hypothyroidism    Acute on chronic diastolic heart failure ([facility]    Pure hypercholesterolemia    Hyperthyroidism    Paced rhythm on electrocardiogram (ECG)    Pacemaker    Biventricular cardiac pacemaker in situ    CKD (chronic kidney disease)    Spinal stenosis of lumbar region    Atrial fibrillation with rapid ventricular response (HCC)       Allergies   Allergen Reactions    Acetaminophen      Other reaction(s): UPSET STOMACH AND NAUSEA    Amiodarone Other (See Comments)     Causes issues with liver enzymes.  Hydrocodone      Other reaction(s): UPSET STOMACH AND NAUSEA       Current Outpatient Medications   Medication Sig Dispense Refill    dilTIAZem (CARDIZEM CD) 120 MG extended release capsule Take 1 capsule by mouth daily 30 capsule 3    dofetilide (TIKOSYN) 125 MCG capsule Take 1 capsule by mouth every 12 hours 60 capsule 3    apixaban (ELIQUIS) 2.5 MG TABS tablet Take 1 tablet by mouth 2 times daily 60 tablet 3    metoprolol succinate (TOPROL XL) 25 MG extended release tablet Take 3 tablets by mouth 2 times daily 180 tablet 3    budesonide (PULMICORT FLEXHALER) 90 MCG/ACT AEPB inhaler Inhale 2 puffs into the lungs 2 times daily 1 each 3    furosemide (LASIX) 20 MG tablet Take 0.5 tablets by mouth daily 30 tablet 3    pravastatin (PRAVACHOL) 40 MG tablet TAKE 1 TABLET BY MOUTH DAILY 90 tablet 3    levothyroxine (SYNTHROID) 112 MCG tablet Take 1 tablet by mouth every morning (before breakfast)      LORazepam (ATIVAN) 0.5 MG tablet TAKE ONE TABLET BY MOUTH DAILY AS NEEDED  3    Cholecalciferol (VITAMIN D PO) Take 4,000 Units by mouth daily        No current facility-administered medications for this visit.        Social History     Socioeconomic History    Marital status:      Spouse name: Not on file    Number of children: Not on file    Years of education: Not on file    Highest education level: Not on file   Occupational History    Not on file   Tobacco Use    Smoking status: Never Smoker    Smokeless tobacco: Never Used   Vaping Use    Vaping Use: Never used   Substance and Sexual Activity    Alcohol use: No    Drug use: No    Sexual activity: Not Currently   Other Topics Concern    Not on file   Social History Narrative    Not on file     Social Determinants of Health     Financial Resource Strain:     Difficulty of Paying Living Expenses: Not on file   Food Insecurity:     Worried About Running Out of Food in the Last Year: Not on file    Isamar of Food in the Last Year: Not on file   Transportation Needs:     Lack of Transportation (Medical): Not on file    Lack of Transportation (Non-Medical): Not on file   Physical Activity:     Days of Exercise per Week: Not on file    Minutes of Exercise per Session: Not on file   Stress:     Feeling of Stress : Not on file   Social Connections:     Frequency of Communication with Friends and Family: Not on file    Frequency of Social Gatherings with Friends and Family: Not on file    Attends Jainism Services: Not on file    Active Member of [address] [place] [address] [employer] or Organizations: Not on file    Attends Club or Organization Meetings: Not on file    Marital Status: Not on file   Intimate Partner Violence:     Fear of Current or Ex-Partner: Not on file    Emotionally Abused: Not on file    Physically Abused: Not on file    Sexually Abused: Not on file   Housing Stability:     Unable to Pay for Housing in the Last Year: Not on file    Number of [place] in the Last Year: Not on file    Unstable Housing in the Last Year: Not on file       History reviewed. No pertinent family history. Review of Systems:  Heart: as above   Lungs: as above   Eyes: denies changes in vision or discharge.    Ears: denies changes in hearing or pain. Nose: denies epistaxis or masses   Throat: denies sore throat or trouble swallowing. Neuro: denies numbness, tingling, tremors. Skin: denies rashes or itching. : denies hematuria, dysuria   GI: denies vomiting, diarrhea   Psych: denies mood changed, anxiety, depression. All other systems negative. Physical Exam   /72   Resp 22   Ht 5' 7\" (1.702 m)   Wt 167 lb (75.8 kg)   BMI 26.16 kg/m²   Constitutional: Oriented to person, place, and time. Well-developed and well-nourished. No distress. Head: Normocephalic and atraumatic. Eyes: EOM are normal. Pupils are equal, round, and reactive to light. Neck: Normal range of motion. Neck supple. No hepatojugular reflux and no JVD present. Carotid bruit is not present. No tracheal deviation present. No thyromegaly present. Cardiovascular: Normal rate, regular rhythm, normal heart sounds and intact distal pulses. Exam reveals no gallop and no friction rub. No murmur heard. Pulmonary/Chest: Effort normal and breath sounds normal. No respiratory distress. No wheezes. No rales. No tenderness. Abdominal: Soft. Bowel sounds are normal. No distension and no mass. No tenderness. No rebound and no guarding. Musculoskeletal: Normal range of motion. No edema and no tenderness. Lymphadenopathy:   No cervical adenopathy. No groin adenopathy. Neurological: Alert and oriented to person, place, and time. Skin: Skin is warm and dry. No rash noted. Not diaphoretic. No erythema. Psychiatric: Normal mood and affect. Behavior is normal.     EKG:  A-V paced. Cath Findings 6/26/17:   Left main: 0% stenosis  LAD: mild, calcific disease  Circumflex: mild disease   RCA: Dominant. 80 % mid stenosis. Hemodynamics: Ao: 115/63  Interventional procedure:   1. PCI vessel: RCA. Lesion type: B. JIM III flow pre PCI. Angioplasty performed with 2.5 balloon. Stenting performed with Alpine DANE 2.75 X 18 .    Result: 0% residual stenosis and JIM III distal flow. ASSESSMENT AND PLAN:  Patient Active Problem List   Diagnosis    Chest pain    Persistent atrial fibrillation ([address]    Essential hypertension    HLD (hyperlipidemia)    Hypothyroidism    Acute on chronic diastolic heart failure ([address]    Pure hypercholesterolemia    Hyperthyroidism    Paced rhythm on electrocardiogram (ECG)    Pacemaker    Biventricular cardiac pacemaker in situ    CKD (chronic kidney disease)    Spinal stenosis of lumbar region    Atrial fibrillation with rapid ventricular response ([address]     1. GARG:    Stable echo 1/28/2020. Stress normal 6/12/2020.     2. CAD: Asymptomatic. Continue BB/statin. 3. PAfib: In sinus. Now on tikosyn/cardizem/BB/Eliquis. (avoid digoxin in future as she had anorexia with it in past)    4. Pacer: Per EP. 5. CKD: Follow labs. 6. Hypothyroid: On HRT. Derrick Leo D.O.   Cardiologist  Cardiology, [facility]

## 2022-06-01 NOTE — PROGRESS NOTES
Cardiac Electrophysiology Outpatient Progress Note    Sandra Vallejo  1935  Date of Service: 6/3/2022  Referring Provider/PCP: Jazzmine Brock   Cardiologist: Anni Amaya DO  Electrophysiologist: Brenda Mariano MD    SUBJECTIVE: Sandra Vallejo is a 81 y.o. female with a history of nonvalvular persistent AF sp DCCV (6/13/2019 and 3/21/2022), HFpEFrecovered/nonischemic sp [facility] Neal CRT-P (dual-chamber PPM DOI: 5/4/2012; CRTP upgrade: 3/11/2019), CAD sp stent RCA (2017), HTN, CKD 3/4, spinal stenosis, and hypothyroidism. She is managed by Maia Chambers and Tom Gtz with apixaban 2.5 mg twice daily, Lasix 10 mg daily, metoprolol  mg twice daily, and Pravachol 40 mg daily. In 2012, she was reportedly diagnosed with intermittent AV block and treated with a [place] dual-chamber pacemaker. She was diagnosed with atrial fibrillation at some point prior to 2013. She reportedly was intolerant to amiodarone in the past due to elevated LFTs this was confiremed by her Daughter. In 2019, she transferred EP care from outside hospital to [facility], Dr Tom Gtz. At that time she was noted to have LVEF of 45% with RV pacing greater than 40%, which was treated with upgrade of dual-chamber pacemaker to CRT P. In June 2019, she had a recurrence of AF around this time and was treated with direct-current cardioversion and sotalol 120 mg daily. In November 2019, patient discontinued sotalol on her own. In the past, discussion of AVJ ablation was had between establish EP and patient/family and if she were to develop increased AF/AT burden with reduction in BiV pacing. She is enrolled in remote monitoring which is reported reduction in BiV pacing to 44% and in AF burden of approximately 50 to 60% with episodes of RVR. This was treated with titration of beta-blockade. On 3/21/2022, she presented with chief complaint of dyspnea. She was diagnosed with AF with RVR (121 bpm).   A direct-current cardioversion was performed, which reportedly restored sinus rhythm for a brief period of time prior to return of AF. She spontaneously converted to sinus on hospital day one yet continues to have paroxysmal of atrial fibrillation. Her EKG showed a QTc of 541 on the 22nd and on 03/23 showed a QTc of 493ms with Bi-V pacing. She was started on Tikosyn 125mcg Every 12 hours and her QTc today is 538ms. EP service is now consulted by admitting physician for evaluation management of AF. Patient denies any other complaints at this time. 03/25/22: She has completed 4 of 6 monitored doses without QTc prolongation the manual assessment of her QTc was 514ms this morning, her CrCl is  26.44mL/min, she remains AP-BiV paced and is without cardiac complaints.      3/26/22: The patient I seen in the hospital for follow up. She denies any cardiac symptoms. QTc was 536 ms by manual calculation today    6/3/22: Clairce Huntley presents today for follow up. She reports feeling ongoing dizziness since discharged from the hospital which she attributes it to [address]. The patient denies any chest pain, dyspnea, palpitations, syncope, orthopnea or paroxysmal nocturnal dyspnea. She offers no complaints from a device POV. The device site looks well healed and free from infection or erosion. The patient continues to be followed remotely. Device interrogation today showed <1% AF burden.     Patient Active Problem List    Diagnosis Date Noted    Atrial fibrillation with rapid ventricular response ([address] 03/21/2022    Spinal stenosis of lumbar region 03/02/2020    CKD (chronic kidney disease) 03/12/2019    Biventricular cardiac pacemaker in situ     Pacemaker 03/11/2019    Paced rhythm on electrocardiogram (ECG)     Pure hypercholesterolemia     Hyperthyroidism     Chest pain 06/03/2017    Persistent atrial fibrillation ([address] 06/03/2017    Essential hypertension 06/03/2017    HLD (hyperlipidemia) 06/03/2017    Hypothyroidism 06/03/2017    Acute on chronic diastolic heart failure (HCC)        Current Outpatient Medications   Medication Sig Dispense Refill    dilTIAZem (CARDIZEM CD) 120 MG extended release capsule Take 1 capsule by mouth daily 30 capsule 3    dofetilide (TIKOSYN) 125 MCG capsule Take 1 capsule by mouth every 12 hours 60 capsule 3    apixaban (ELIQUIS) 2.5 MG TABS tablet Take 1 tablet by mouth 2 times daily 60 tablet 3    metoprolol succinate (TOPROL XL) 25 MG extended release tablet Take 3 tablets by mouth 2 times daily 180 tablet 3    budesonide (PULMICORT FLEXHALER) 90 MCG/ACT AEPB inhaler Inhale 2 puffs into the lungs 2 times daily 1 each 3    furosemide (LASIX) 20 MG tablet Take 0.5 tablets by mouth daily 30 tablet 3    pravastatin (PRAVACHOL) 40 MG tablet TAKE 1 TABLET BY MOUTH DAILY 90 tablet 3    levothyroxine (SYNTHROID) 112 MCG tablet Take 1 tablet by mouth every morning (before breakfast)      LORazepam (ATIVAN) 0.5 MG tablet TAKE ONE TABLET BY MOUTH DAILY AS NEEDED  3    Cholecalciferol (VITAMIN D PO) Take 4,000 Units by mouth daily        No current facility-administered medications for this visit. Allergies   Allergen Reactions    Acetaminophen      Other reaction(s): UPSET STOMACH AND NAUSEA    Amiodarone Other (See Comments)     Causes issues with liver enzymes.  Hydrocodone      Other reaction(s): UPSET STOMACH AND NAUSEA     ROS:   Constitutional: Negative for fever, activity change and appetite change. HENT: Negative for epistaxis. Eyes: Negative for diploplia, blurred vision. Respiratory: Negative for cough, chest tightness, shortness of breath and negative for wheezing. Cardiovascular: pertinent positives in HPI  Gastrointestinal: Negative for abdominal pain and blood in stool.    All other review of systems are negative     PHYSICAL EXAM:  Vitals:    06/03/22 1359   BP: 116/70   Site: Left Upper Arm   Position: Sitting   Cuff Size: Medium Adult   Pulse: 80   Resp: 18   Weight: 166 lb 9.6 oz (75.6 kg)   Height: 5' 7\" (1.702 m)      Constitutional: Oriented to person, place, and time. Well-developed and cooperative. Head: Normocephalic and atraumatic. Eyes: Conjunctivae are normal.     Cardiovascular: S1 normal, S2 normal and intact distal pulses. Normal rate and rhythm. PMI is not displaced. Pulmonary/Chest: Effort normal and bibasilar crackles. No respiratory distress. Abdominal: Soft. No tenderness. Musculoskeletal: Normal range of motion of all extremities, no muscle weakness. Neurological: Alert and oriented to person, place, and time. Gait - walker  Skin: Skin is warm and dry. No bruising, no ecchymosis and no rash noted. Extremity: No clubbing or cyanosis. No edema. Psychiatric: Normal mood and affect. Thought content normal.   Pacemaker site: stable, well healed, no evidence of erosion. Pertinent Cardiac Testing:     Stress test 6/12/20     Conclusion>         The stress and resting images show normal perfusion with an         attenuation artifact.         The left ventricle is normal size.         The left ventricular systolic function is hyperdynamic.         Left ventricular wall motion is normal.         There is a medium sized area of mildly reduced uptake in the apical         segment of the anterior wall which is seen on the stress images as         well as the resting images. This area thickens and moves normally and         is most consistent with attenuation artifact.         Considering the quantitative measurements the study suggests a low         risk for adverse myocardial events.                                            ** REPORT SIGNED IN OTHER VENDOR SYSTEM 06/12/2020 **                        Reported ByTd Edouard MD      Echocardiogram 1/28/20:   Conclusion>        Mild to moderate concentric left ventricular hypertrophy.        LVEF is 55-60%.      The diastolic function is indeterminate.        There is normal LV segmental wall motion.      The right ventricular systolic function is normal.        Left atrium is severely dilated.        Right atrium is severely dilated.        There is moderate mitral annular calcification.        Mild mitral regurgitation.        Moderate tricuspid regurgitation.                                         ** REPORT SIGNED IN OTHER VENDOR SYSTEM 01/28/2020 **                       Reported By: Rohit Batista MD     Cardiac Catheterization 6/26/17:   PROCEDURE: Left heart catheterization, coronary angiography, PCI of RCA. TECHNIQUE: Right radial access was obtained with the aid of ultrasound   guidance. A glide sheath slender 6 was placed with no difficulty. Coronary   angiography was performed with a TIG catheter for the right coronary artery   and a JL3.5 for the left coronary artery. The aortic valve was not crossed. Heparin was administered and guided by ACT determination. The patient had   received only aspirin yesterday and was given 600 mg oral Plavix in the lab. A 6-Sudanese JR4 guiding catheter with side holes was used for the right   coronary artery which provided excellent backup. Floppy J-wire was advanced   to the distal portion of the vessel, and the lesion was predilated with a 2.5   x 15 mm balloon and stented with an Alpine 2.75 x 18 mm stent. There were no   complications. Indication:   1. Unstable angina   2. Catheterization: AUC score 8 . Indication 4.   3. PCI: AUC score 9 Indication 11. Procedure: Left Heart Catheterization, coronary angiography, percutaneous coronary intervention to RCA   Anesthesia: Fentanyl, benadryl   Time sedation was administered: 0841. I was present in the room when sedation was administered. Procedure end time: 1263   Time spent with face to face monitoring of moderate sedation: 45 min   Cardiac cath performed via right radial approach using Slender 6 sheath.    Findings:   Left main: 0% stenosis   LAD: mild, calcific disease   Circumflex: mild disease   RCA: Dominant. 80 % mid stenosis. Hemodynamics: Ao: 115/63   Interventional procedure:   1. PCI vessel: RCA. Lesion type: B. JIM III flow pre PCI. Angioplasty performed with 2.5 balloon. Stenting performed with Alpine DANE 2.75 X 18 . Result: 0% residual stenosis and JIM III distal flow. Drugs: . Anticoagulation was maintained with heparin . 600 mg Plavix load given in cath lab. Right radial sheath: pulled, TR band applied. There was good distal pulses in the RUE at the end of the procedure. Complication: None   Blood loss: 5 cc   Contrast used: 91cc   Post op diagnosis:   Unstable Angina   PCI of RCA   Plan:   DAPT   Risk profile modification. See orders     Device Interrogation: 6/3/22   Make/Model [employer] Neal Allure  Mode DDDR 75/120  Battery Voltage/Longevity:  6.3 years   Pacing: A: 90%  RV: 90%  LV: 90%  P wave: 2.5 mV  Impedance: 440 ohms   Threshold: 0.75 V @ 0.5 ms  RV R wave: 6.0 mV  Impedance: 410 ohms   Threshold: 1.25 V @ 0.5 ms  LV R wave: Impedance:760 ohms   Threshold:1.0 V @ 0.5 ms  Episodes: AF <1%  Reprogramming included: adjusted rate response. Overall device function is normal    All device programmable settings were evaluated per above and in the scanned document, along with iterative adjustments (capture thresholds) to assess and select the most appropriate final programming to provide for consistent delivery of the appropriate therapy and to verify function of the device. EK/3/22: A paced BiV paced, rate: 80bpm, NV: 118 QRS: 80, QTc: 409 ms - see scanned cardiology    Impression:    1.  Persistent atrial fibrillation  - Asymptomatic.  - [id number]-YTHz of 5.  - On Toprol XL and Cardizem CD for rate control.  - Developed anorexia/elevated liver enzymes with Digoxin and Amiodarone in the past.  - Started on Sotalol 120 mg QOD on 6/10/19 and stopped Sotalol by herself in 2019  - S/p CV 19 and 22  - On Eliquis 2.5 mg bid for stroke risk reduction. (age 81, Wt 82.6, and fluctuation of Cr level)  - Prior Intolerance to Amiodarone due to elevated LFT. - Recurrent AF with RVR and a decrease in BiV pacing 03/23/22. - Tikosyn was started on 03/23/22.  - Presents in NSR with stable QTc.  - Re-education on importance of well controlled HTN (goal BP < 130/80), adequate weight control (goal BMI of < 27), physical activity consisting of moderate cardiopulmonary exercise up to a goal of 250 min/wk, daily compliance with CPAP in treating sleep apnea, smoking cessation and limited ETOH intake.      2.  CRT-P in situ  - [facility] Neal dual chamber pacemaker implantation on 5/4/12.  - S/p CRT-P upgrade 3/11/19.  - Normal device function.  - Bi- 90%     3. Coronary artery disease  - S/p PCI of RCA on 6/26/17.  - On Toprol XL and Pravahol.      4. Hypertension  - Well controlled. - On Lasix, Cardizem and Toprol.     5. Hyperlipidemia  - On Pravachol.     6. Hypothyroidism  - On Synthroid.     7. CKD     8. Dizziness   - Suspected from orthostatic hypotension   - Will discontinue Cardizem.       9. PSVT  - On Toprol XL and Cardizem.       Recommendation:    1. Normal device function. 2. Stop Cardizem due to ongoing dizziness suspected from orthostatic hypotension. 3. Continue Tikosyn 125 mcg every 12 hours. 4. Continue Toprol XL 75 mg bid  5. Continue  Eliquis 2.5 mg BID due to fluctuation of creatinine level. 6. Merlin remote transmission every 91 days. 7. Office follow-up in 3 months or sooner as needed. Encouraged the patient to call the office with EP concerns prior to follow-up. Thank you for allowing me to participate in their care. I have spent a total of 40 minutes with the patient and the family reviewing the above stated recommendations.   And a total of >50% of that time involved face-to-face time providing counseling and/or coordination of care with the other providers, preparation for the clinic visit, reviewing records/tests, counseling/education of the patient,

## 2022-06-03 ENCOUNTER — OFFICE VISIT (OUTPATIENT)
Dept: NON INVASIVE DIAGNOSTICS | Age: 87
End: 2022-06-03
Payer: MEDICARE

## 2022-06-03 VITALS
HEART RATE: 80 BPM | HEIGHT: 67 IN | DIASTOLIC BLOOD PRESSURE: 70 MMHG | WEIGHT: 166.6 LBS | RESPIRATION RATE: 18 BRPM | SYSTOLIC BLOOD PRESSURE: 116 MMHG | BODY MASS INDEX: 26.15 KG/M2

## 2022-06-03 DIAGNOSIS — Z95.0 BIVENTRICULAR CARDIAC PACEMAKER IN SITU: ICD-10-CM

## 2022-06-03 DIAGNOSIS — I48.19 PERSISTENT ATRIAL FIBRILLATION (HCC): Primary | ICD-10-CM

## 2022-06-03 PROCEDURE — G8400 PT W/DXA NO RESULTS DOC: HCPCS | Performed by: INTERNAL MEDICINE

## 2022-06-03 PROCEDURE — 1036F TOBACCO NON-USER: CPT | Performed by: INTERNAL MEDICINE

## 2022-06-03 PROCEDURE — G8417 CALC BMI ABV UP PARAM F/U: HCPCS | Performed by: INTERNAL MEDICINE

## 2022-06-03 PROCEDURE — 1090F PRES/ABSN URINE INCON ASSESS: CPT | Performed by: INTERNAL MEDICINE

## 2022-06-03 PROCEDURE — G8427 DOCREV CUR MEDS BY ELIG CLIN: HCPCS | Performed by: INTERNAL MEDICINE

## 2022-06-03 PROCEDURE — 1123F ACP DISCUSS/DSCN MKR DOCD: CPT | Performed by: INTERNAL MEDICINE

## 2022-06-03 PROCEDURE — 99215 OFFICE O/P EST HI 40 MIN: CPT | Performed by: INTERNAL MEDICINE

## 2022-06-17 ENCOUNTER — TELEPHONE (OUTPATIENT)
Dept: NON INVASIVE DIAGNOSTICS | Age: 87
End: 2022-06-17

## 2022-06-17 RX ORDER — METOPROLOL SUCCINATE 100 MG/1
100 TABLET, EXTENDED RELEASE ORAL 2 TIMES DAILY
Qty: 180 TABLET | Refills: 1 | Status: SHIPPED
Start: 2022-06-17 | End: 2022-10-03

## 2022-06-17 RX ORDER — METOPROLOL SUCCINATE 100 MG/1
100 TABLET, EXTENDED RELEASE ORAL 2 TIMES DAILY
COMMUNITY
End: 2022-06-17 | Stop reason: SDUPTHER

## 2022-06-17 NOTE — TELEPHONE ENCOUNTER
----- Message from Betito Bernabe MD sent at 6/16/2022  5:01 PM EDT -----  RT-P remote device alert for SVT/AVNRT on Presenting EGM- Below HVR Detection    AF w/ RVR, low BiVp  Hx AF, diastolic HF, CKD  Per medication list, pt taking Tikosyn, Metoprolol S, Eliquis  Mode: DDDR LRL 75 bpm  Presenting rhythm: SVT/AVNRT 128 bpm  HVR Detection Trigger programmed 150 bpm/400ms  Mean BiVp 90% since 6/3/2022  CorVue: Stable- no impedance evidence of current fluid accumulation; TI is currently trending higher than the reference impedance  Created By: Nhung Cantrell 06/16/2022 07:55Edited By: Nhung Cantrell 06/16/2022 07:58  Tachycardia: SVT  1  4 HVR episodes detected since 6/3/2022 clinic check  Longest detected duration 12:25 (M:S) average V rates ~150 bpm  EGMs appear c/w SVT/AVNRT (atrial beats in blanking)  EGMs do not show onset/termination  Additional Notes:  Meds: Tikosyn 125 mcg q12h, Toprol XL 75 mg bid, and Eliquis      Please ask her to increase Toprol XL to 100 mg bid and check remote in 1 week. Thanks.

## 2022-06-17 NOTE — TELEPHONE ENCOUNTER
Italo Bee returned my call. I told her Dr. Darvin Gentile would like Nancy to increase the Toprol XL to 100 mg bid. Italo Bee will give her 4 of the 25 mg tablets twice daily, then  the 100 mg tablets at [facility] in [facility].     Edilberto August RN, BSN  [facility]

## 2022-06-17 NOTE — TELEPHONE ENCOUNTER
I called daughter, Mike Hanna, at work. I left a message for her to call the office.     Sofia Goodman RN, BSN  [facility]

## 2022-07-11 RX ORDER — DOFETILIDE 0.12 MG/1
125 CAPSULE ORAL EVERY 12 HOURS SCHEDULED
Qty: 180 CAPSULE | Refills: 0 | Status: SHIPPED | OUTPATIENT
Start: 2022-07-11

## 2022-07-11 NOTE — TELEPHONE ENCOUNTER
Requesting Tikosyn refill - CrCl calculated off the following information:    Tikosyn dosage: 125 mcg     Age: 86   Ht: 1.702 m  Wt: 75.6 kg  Cr: 1.3 mg/dl (based off labs on 03/2022)  CrCl: 25.18 mL/min    Last QTc: 409 msec (based on last EKG on 04/27)

## 2022-09-09 DIAGNOSIS — I48.19 PERSISTENT ATRIAL FIBRILLATION (HCC): Chronic | ICD-10-CM

## 2022-09-09 RX ORDER — PRAVASTATIN SODIUM 40 MG
TABLET ORAL
Qty: 90 TABLET | Refills: 3 | Status: SHIPPED | OUTPATIENT
Start: 2022-09-09

## 2022-09-16 NOTE — PROGRESS NOTES
Active Problem List    Diagnosis Date Noted    CKD (chronic kidney disease) 03/12/2019    Biventricular cardiac pacemaker in situ     Pacemaker 03/11/2019    Paced rhythm on electrocardiogram (ECG)     Pure hypercholesterolemia     Hyperthyroidism     Chest pain 06/03/2017    Persistent atrial fibrillation 06/03/2017    Essential hypertension 06/03/2017    HLD (hyperlipidemia) 06/03/2017    Hypothyroidism 06/03/2017    Acute on chronic diastolic heart failure (HCC)        Current Outpatient Medications   Medication Sig Dispense Refill    ELIQUIS 2.5 MG TABS tablet TAKE ONE TABLET BY MOUTH TWO TIMES A  tablet 3    diltiazem (CARDIZEM) 30 MG tablet Take 1 tablet by mouth 3 times daily 90 tablet 3    pravastatin (PRAVACHOL) 40 MG tablet TAKE ONE TABLET BY MOUTH DAILY 90 tablet 3    metoprolol succinate (TOPROL XL) 100 MG extended release tablet Take 1 tablet by mouth 2 times daily 180 tablet 3    levothyroxine (SYNTHROID) 100 MCG tablet TAKE 1 TABLET BY MOUTH ON AN EMPTY STOMACH IN THE MORNING  1    LORazepam (ATIVAN) 0.5 MG tablet TAKE ONE TABLET BY MOUTH DAILY AS NEEDED  3    furosemide (LASIX) 20 MG tablet Take 2 tablets by mouth daily (Patient taking differently: Take 20 mg by mouth 2 times daily ) 180 tablet 3    Cholecalciferol (VITAMIN D PO) Take 4,000 Units by mouth daily        No current facility-administered medications for this visit. Allergies   Allergen Reactions    Amiodarone Other (See Comments)     Causes issues with liver enzymes. ROS:   Constitutional: Negative for fever, activity change and appetite change. HENT: Negative for epistaxis. Eyes: Negative for diploplia, blurred vision. Respiratory: Negative for cough, chest tightness, shortness of breath and wheezing. Cardiovascular: pertinent positives in HPI  Gastrointestinal: Negative for abdominal pain and blood in stool.    All other review of systems are negative     PHYSICAL EXAM:  Vitals: Ejection fraction is 70 %. 3. Mild inferior hypokinesis and apical dyskinesis.        Echocardiogram 2/15/19:   EXAM: Comprehensive 2D, Doppler, and color-flow   Echocardiogram    Patient Location: Inpatient    Blood Pressure: 104/64 mmHg  HR: 70 bpm  Rhythm: AF,Pacemaker    Other Information   Study Quality: Fair  Technically limited study due to inability to position patient has   vertigo laying down. .    Indications  DX: ACUTE ON CHRONIC DIASTOLIC HEART FAILURE    2D Dimensions  RVDd: 3.2 cm LVEF(%): 40.7 (>50%)  IVSd: 1.1 (0.7-1.1cm) LVOT Diam: 2.3 (1.8-2.4cm)   LVDd: 4.6 cm   PWd: 1.0 (0.7-1.1cm) Ascending Aorta: 2.9 cm  LVDs: 3.9 (2.5-4.0cm)   Left Atrium: 4.4 (2.7-4.0cm) TAPSE: 1.3 (<1.7)  Stanford's LVEF: 40.7 %  LV Single Plane 4CH: 44.0 %     M-Mode Dimensions  RVDd: 1.3 (2.1-3.2cm) Left Atrium: 4.5 (2.5-4.0cm)  IVSd: 1.3 (0.7-1.1cm) Aortic Root: 3.2 (2.2-3.7cm)  LVDd: 4.9 (4.0-5.6cm) Aortic Cusp Exc.: 1.4 (1.5-2.0cm)  PWd: 1.2 (0.7-1.1cm) MV EPSS: 1.6 (<0.5cm)  FS(%): 28.3 %  LVDs: 3.5 (2.0-3.8cm) ESV (Teich): 50.9 ml  LVEF(%): 44.4 (>50%)     PAGE 1 Signed Report (CONTINUED)  Name: Ricco Bell  Phys: Jes Johnson  : 1935 Age: 81 Sex: F  Acct: [de-identified] Loc: CARD   Exam Date: 02/15/2019 Status: REG CLI  Radiology No: [id number]  Unit No: [id number]             EXAM# TYPE/EXAM RESULT   [id number] ECHO/ECHOCARDIOGRAM COMPLETE SEE REPORT   <Continued>      Volumes  Left Atrial Volume (Systole)   Single Plane 4CH: 69.0 mL Single Plane 2CH: 65.0 mL  Biplane LA Volume: 67.0 mL LA ESV Index: 36.0 mL/m2    Aortic Valve  AO Mean Gr.: 2.0 mmHg LV Mean P.0 mmHg  AO V2 Mean: 0.6 m/s   AO V2 VTI: 16.4 cm LV Mean: 0.4 m/s  BALJEET (VTI): 3.0 cm2 LV V1 VTI: 11.9 cm    Mitral Valve  MV PHT: 54.0 ms  MV Mean Gr.: 3.0 mmHg MVA (PHT): 4.1 cm2  MV E Max Albert.: 1.3 m/s   MV Decel.  Time: 261.0 ms  MV VTI: 33.8 cm     TDI  E/Lateral E': 13.0 E/Medial E': 13.0  E': 59.5 Medial E' Albert.: 0.1 m/s  Lateral E' Albert.: 0.1 m/s    Pulmonary Valve  PV Peak Albert.: 0.6 m/s PV Peak Gr.: 2.0 mmHg    Tricuspid Valve  TR Peak Gr.: 29.0 mmHg     Left Ventricle  The left ventricle is normal size. There is global hypokinesis of the   left ventricle. Mild concentric left ventricular hypertrophy. LVEF is   45%. Grade II - pseudonormal filling dynamics. Right Ventricle  Right ventricle is moderately dilated. The right ventricle is normal   size. The right ventricular systolic function is normal. Device lead     PAGE 2 Signed Report (CONTINUED)  Name: Savana Roman  Phys: Erwin Dolores  : 1935 Age: 81 Sex: F  Acct: [de-identified] Loc: CARD   Exam Date: 02/15/2019 Status: REG CLI  Radiology No: [id number]  Unit No: [id number]             EXAM# TYPE/EXAM RESULT   [id number] ECHO/ECHOCARDIOGRAM COMPLETE SEE REPORT   <Continued>    is present in the right ventricle. Atria  Left atrium is mildly dilated. Interatrial septum not well   visualized. Right atrium size is normal.    Aortic Valve  The Aortic valve is sclerotic. Mitral Valve  There is mitral annular calcification. Mild to moderate mitral   regurgitation. Tricuspid Valve  The tricuspid valve is normal in structure. Moderate tricuspid   regurgitation, RVSP 34mmHg    Pulmonic Valve  Pulmonic valve is not well visualized. Mild pulmonic   regurgitation. Great Vessels  Aortic root is normal in size. IVC is not well   visualized. Pericardium  Mild anterior pericardial effusion. Critical Notification  Critical Value: No    <Conclusion>  The left ventricle is normal size. There is global hypokinesis of the left ventricle, LVEF is 45 +/-   3%  Mild concentric left ventricular hypertrophy. Grade II - pseudonormal filling dynamics. Left atrium is mildly dilated.     PAGE 3 Signed Report (CONTINUED)  Name: Savana Roman  Phys: Erwin Dolores  : 1935 Age: 81 Sex: F  Acct: [de-identified] Loc: CARD   Exam Date: 02/15/2019 Status: REG CLI  Radiology No: mild, calcific disease   Circumflex: mild disease   RCA: Dominant. 80 % mid stenosis. Hemodynamics: Ao: 115/63   Interventional procedure:   1. PCI vessel: RCA. Lesion type: B. JIM III flow pre PCI. Angioplasty performed with 2.5 balloon. Stenting performed with Alpine DANE 2.75 X 18 . Result: 0% residual stenosis and JIM III distal flow. Drugs: . Anticoagulation was maintained with heparin . 600 mg Plavix load given in cath lab. Right radial sheath: pulled, TR band applied. There was good distal pulses in the RUE at the end of the procedure. Complication: None   Blood loss: 5 cc   Contrast used: 91cc   Post op diagnosis:   Unstable Angina   PCI of RCA   Plan:   DAPT   Risk profile modification. See orders     Stress Test 6/3/17:   FINDINGS:   Minimal attenuation artifact is seen at the inferior left ventricular   myocardial wall due to underlying left hepatic lobe.       There is a normal distribution of left ventricular myocardial activity   on both the LexiScan stress and rest SPECT myocardial perfusion   images. Gated study shows normal left ventricular wall motion and   myocardial thickening during systole. Resting left ventricular   ejection fraction is calculated at 77%.           Impression   No evidence of stress-induced left ventricular myocardial ischemia.                 Device Interrogation: 1/23/20   Make/Model Sr Neal Allure  DOI 3/11/19  Mode DDDR 75/120 ppm  Battery Voltage/Longevity:  5.5 years    Pacing: A: 88%  BV: 96%   P wave: 2.1mV  Impedance: 440 ohms   Threshold: 0.5 V @ 0.5 ms  RV R wave: 10.0 mV  Impedance: 450 ohms   Threshold: 1.0 V @ 1.0 ms  LV R wave: Impedance:850 ohms   Threshold:1.0 V @ 0.5 ms  Episodes: 13 AMS longest 1 minute  - 4 high ventricular rates 40 sec to 3 min. 15 sec.  In duration rate 159bpm rate   Reprogramming included: see below  Overall device function is normal    All device programmable settings were evaluated per above and in the scanned document, along with iterative adjustments (capture thresholds) to assess and select the most appropriate final programming to provide for consistent delivery of the appropriate therapy and to verify function of the device.         EC19: AV paced, HR 76 bpm, QTc 446 ms - see scanned cardiology    I have independently reviewed all of the ECGs and rhythm strips per above    I have personally reviewed the laboratory, cardiac diagnostic and radiographic testing as outlined above:         Impression:    1. Persistent atrial fibrillation  - Likely asymptomatic.  - [id number] of 5.  -  On Metoprolol and Cardizem for rate control.  - Developed anorexia/elevated liver enzymes with Digoxin and Amiodarone in the past.  - On Eliquis reduced does for stroke risk reduction. (80 yo, Cr 1.5, Wt 80kg)    - Started on Sotalol 120 mg QOD on 6/10/19   - S/p CV 19  - AF burden 13 AMS longest 1 min. - Stopped Sotalol by herself in 2019  - In sinus today      2.  CRT-P in situ  - S/p St Neal dual chamber pacemaker on 12.  - S/p CRT-P upgrade 3/11/19.  - Proper device function.  - SR, Bi- 96%     3. Coronary artery disease  - S/p PCI of RCA on 17.  - BB, Statin      4. Hypertension  - Well controlled. - On Cardizem and Toprol. - Cardizem  mg discontinued on 19.  - Cardizem 30 mg TID resumed on 7/10/19.     5. Hyperlipidemia  - On Pravachol.     6. Hypothyroidism  - On Synthroid.     7. CKD  -Creat. 1.5 19   - Lasix     8. Dizziness   -Clinical presentation suggests orthostatic hypotension   -Reported high caffeine intake  -Lifestyle modifications encouraged including the avoidance of caffeine and the use of non-caffeinated beverages to stay hydrated, support hose, making transitions from sitting to standing slowly. 9.  High ventricular rates  - Can not exclude VT vs. SVT   - Will check Echo       Recommendation:      1. Echo to assess LV function can be done at [facility]. 2. CMP, Mg, free T4 and free T3, TSH   3. No current indication to resume Sotalol at this time. 4. Continue Toprol  mg bid. 5. Continue Cardizem 30mg TID. 6. Continue Eliquis 2.5 mg bid. 7. Merlin remote monitoring Q 91 days. 8. Further management of HVR will depend on LVEF. 8. Office follow-up in 6 months with Dr. Aubree Ritchie. Encouraged the patient to call the office for any questions or concerns. Thank you for allowing me to participate in their care. I have spent a total of 30 minutes with the patient and his/her family reviewing the above stated recommendations.  And a total of >50% of that time involved face-to-face time providing counseling and or coordination of care with the other providers    Discussed with Jose Turner MD  Electronically signed by THEO Ulloa CNP on 1/23/2020 at 11:23 AM  Cardiac Electrophysiology  [address]  [facility]  1/23/20   10:56 AM 10-59 minutes

## 2022-10-03 RX ORDER — METOPROLOL SUCCINATE 25 MG/1
1 TABLET, EXTENDED RELEASE ORAL
COMMUNITY
Start: 2022-07-02

## 2022-10-04 ENCOUNTER — OFFICE VISIT (OUTPATIENT)
Dept: CARDIOLOGY CLINIC | Age: 87
End: 2022-10-04
Payer: MEDICARE

## 2022-10-04 VITALS
RESPIRATION RATE: 22 BRPM | DIASTOLIC BLOOD PRESSURE: 80 MMHG | HEIGHT: 67 IN | SYSTOLIC BLOOD PRESSURE: 124 MMHG | WEIGHT: 167 LBS | BODY MASS INDEX: 26.21 KG/M2 | HEART RATE: 76 BPM

## 2022-10-04 DIAGNOSIS — I48.19 PERSISTENT ATRIAL FIBRILLATION (HCC): Primary | ICD-10-CM

## 2022-10-04 PROCEDURE — 1123F ACP DISCUSS/DSCN MKR DOCD: CPT | Performed by: INTERNAL MEDICINE

## 2022-10-04 PROCEDURE — 1036F TOBACCO NON-USER: CPT | Performed by: INTERNAL MEDICINE

## 2022-10-04 PROCEDURE — 93000 ELECTROCARDIOGRAM COMPLETE: CPT | Performed by: INTERNAL MEDICINE

## 2022-10-04 PROCEDURE — G8400 PT W/DXA NO RESULTS DOC: HCPCS | Performed by: INTERNAL MEDICINE

## 2022-10-04 PROCEDURE — 1090F PRES/ABSN URINE INCON ASSESS: CPT | Performed by: INTERNAL MEDICINE

## 2022-10-04 PROCEDURE — G8417 CALC BMI ABV UP PARAM F/U: HCPCS | Performed by: INTERNAL MEDICINE

## 2022-10-04 PROCEDURE — G8484 FLU IMMUNIZE NO ADMIN: HCPCS | Performed by: INTERNAL MEDICINE

## 2022-10-04 PROCEDURE — 99214 OFFICE O/P EST MOD 30 MIN: CPT | Performed by: INTERNAL MEDICINE

## 2022-10-04 PROCEDURE — G8427 DOCREV CUR MEDS BY ELIG CLIN: HCPCS | Performed by: INTERNAL MEDICINE

## 2022-10-04 NOTE — PROGRESS NOTES
CHIEF COMPLAINT: CAD/Afib    HISTORY OF PRESENT ILLNESS: Patient is a 81 y.o. female seen at the request of Fredo Obrien. PCI to RCA 6/26/17. Some GARG. No CP. Past Medical History:    1. HTN  2. HLD, on statin therapy  3. Hypothyroidism, on statin therapy. 4. Echo: 6/3/2017:  Left ventricular internal dimensions were normal in diastole and systole, borderline LVH, Normal left ventricular ejection fraction, The left atrium is severely dilated, Moderately enlarged right atrium size, Focal calcification mitral valve leaflets, Moderate mitral annular calcification, Moderate mitral regurgitation is present, Moderate tricuspid regurgitation, Pulmonary hypertension is mild . 5. Lexiscan Stress Test: 6/3/2017: EF 77%; No evidence of stress-induced left ventricular myocardial ischemia. 6. History of cholecystectomy, right knee replacement, s/p lumbar lameinectomy  7. Mild Bilateral Carotid Disease: VL Carotid Brent: 6/24/2014:    A. Right internal carotid artery: 0-40%. B. Left internal carotid artery 0-40%. 8. Persistent Afib: [id number] score 4 (HTN, Age, Female). On Eliquis 2.5 mg BID. A. Evaluated by Dr. Jewels Maradiaga: 6/7/2017 resolution of her hyperthyroidism. Once this is occurred, reassessment of a long-term strategy either that of rate control  which if unachievable with medical therapy may require an atrioventricular junctional ablation or that of a rhythm control strategy if persistent symptoms of exertional intolerance or dyspnea are noted which will likely require an alternative antiarrhythmic agent based on toxicity with previous utilized amiodarone. 9. History of Complete Heart Block s/p dual chamber pacemaker (St. Neal). Last reported interrogation was on 6/3/2017 (reports requested).      Past Medical History:   Diagnosis Date    Atrial fibrillation ([address]     Heart palpitations     History of kidney problems     History of stress test Hyperlipidemia     Hypertension     Hypothyroidism     Vertigo        Patient Active Problem List   Diagnosis    Chest pain    Persistent atrial fibrillation (HCC)    Essential hypertension    HLD (hyperlipidemia)    Hypothyroidism    Acute on chronic diastolic heart failure ([facility] [address]    Pure hypercholesterolemia    Hyperthyroidism    Paced rhythm on electrocardiogram (ECG)    Pacemaker    Biventricular cardiac pacemaker in situ    CKD (chronic kidney disease)    Spinal stenosis of lumbar region    Atrial fibrillation with rapid ventricular response (HCC)       Allergies   Allergen Reactions    Acetaminophen      Other reaction(s): UPSET STOMACH AND NAUSEA    Amiodarone Other (See Comments)     Causes issues with liver enzymes. Hydrocodone      Other reaction(s): UPSET STOMACH AND NAUSEA       Current Outpatient Medications   Medication Sig Dispense Refill    metoprolol succinate (TOPROL XL) 25 MG extended release tablet Take 1 tablet by mouth      pravastatin (PRAVACHOL) 40 MG tablet TAKE ONE TABLET BY MOUTH DAILY 90 tablet 3    dofetilide (TIKOSYN) 125 MCG capsule Take 1 capsule by mouth every 12 hours 180 capsule 0    apixaban (ELIQUIS) 2.5 MG TABS tablet Take 1 tablet by mouth 2 times daily 60 tablet 3    budesonide (PULMICORT FLEXHALER) 90 MCG/ACT AEPB inhaler Inhale 2 puffs into the lungs 2 times daily 1 each 3    furosemide (LASIX) 20 MG tablet Take 0.5 tablets by mouth daily 30 tablet 3    levothyroxine (SYNTHROID) 112 MCG tablet Take 1 tablet by mouth every morning (before breakfast)      LORazepam (ATIVAN) 0.5 MG tablet TAKE ONE TABLET BY MOUTH DAILY AS NEEDED  3    Cholecalciferol (VITAMIN D PO) Take 4,000 Units by mouth daily        No current facility-administered medications for this visit. Social History     Socioeconomic History    Marital status:       Spouse name: Not on file    Number of children: Not on file    Years of education: Not on file    Highest education level: Not on file Occupational History    Not on file   Tobacco Use    Smoking status: Never    Smokeless tobacco: Never   Vaping Use    Vaping Use: Never used   Substance and Sexual Activity    Alcohol use: No    Drug use: No    Sexual activity: Not Currently   Other Topics Concern    Not on file   Social History Narrative    Not on file     Social Determinants of Health     Financial Resource Strain: Not on file   Food Insecurity: Not on file   Transportation Needs: Not on file   Physical Activity: Not on file   Stress: Not on file   Social Connections: Not on file   Intimate Partner Violence: Not on file   Housing Stability: Not on file       No family history on file. Review of Systems:  Heart: as above   Lungs: as above   Eyes: denies changes in vision or discharge. Ears: denies changes in hearing or pain. Nose: denies epistaxis or masses   Throat: denies sore throat or trouble swallowing. Neuro: denies numbness, tingling, tremors. Skin: denies rashes or itching. : denies hematuria, dysuria   GI: denies vomiting, diarrhea   Psych: denies mood changed, anxiety, depression. All other systems negative. Physical Exam   /80   Pulse 76   Resp 22   Ht 5' 7\" (1.702 m)   Wt 167 lb (75.8 kg)   BMI 26.16 kg/m²   Constitutional: Oriented to person, place, and time. Well-developed and well-nourished. No distress. Head: Normocephalic and atraumatic. Eyes: EOM are normal. Pupils are equal, round, and reactive to light. Neck: Normal range of motion. Neck supple. No hepatojugular reflux and no JVD present. Carotid bruit is not present. No tracheal deviation present. No thyromegaly present. Cardiovascular: Normal rate, regular rhythm, normal heart sounds and intact distal pulses. Exam reveals no gallop and no friction rub. No murmur heard. Pulmonary/Chest: Effort normal and breath sounds normal. No respiratory distress. No wheezes. No rales. No tenderness. Abdominal: Soft.  Bowel sounds are normal. No distension and no mass. No tenderness. No rebound and no guarding. Musculoskeletal: Normal range of motion. No edema and no tenderness. Lymphadenopathy:   No cervical adenopathy. No groin adenopathy. Neurological: Alert and oriented to person, place, and time. Skin: Skin is warm and dry. No rash noted. Not diaphoretic. No erythema. Psychiatric: Normal mood and affect. Behavior is normal.     EKG:  A-V paced. Cath Findings 6/26/17:   Left main: 0% stenosis  LAD: mild, calcific disease  Circumflex: mild disease   RCA: Dominant. 80 % mid stenosis. Hemodynamics: Ao: 115/63  Interventional procedure:   1. PCI vessel: RCA. Lesion type: B. JIM III flow pre PCI. Angioplasty performed with 2.5 balloon. Stenting performed with Alpine DANE 2.75 X 18 . Result: 0% residual stenosis and JIM III distal flow. ASSESSMENT AND PLAN:  Patient Active Problem List   Diagnosis    Chest pain    Persistent atrial fibrillation (HCC)    Essential hypertension    HLD (hyperlipidemia)    Hypothyroidism    Acute on chronic diastolic heart failure ([address]    Pure hypercholesterolemia    Hyperthyroidism    Paced rhythm on electrocardiogram (ECG)    Pacemaker    Biventricular cardiac pacemaker in situ    CKD (chronic kidney disease)    Spinal stenosis of lumbar region    Atrial fibrillation with rapid ventricular response ([address]     1. GARG:    Stable echo 1/28/2020. Stress normal 6/12/2020.     2. CAD: Asymptomatic. Continue BB/statin. 3. PAfib: In sinus. Now on tikosyn/cardizem/BB/Eliquis. (avoid digoxin in future as she had anorexia with it in past)    4. Pacer: Per EP. 5. CKD: Follow labs. 6. Hypothyroid: On HRT. Marci Minor D.O.   Cardiologist  Cardiology, [facility]

## 2022-10-27 LAB
ALBUMIN SERPL-MCNC: 3.7 G/DL
ALP BLD-CCNC: 102 U/L
ALT SERPL-CCNC: 20 U/L
ANION GAP SERPL CALCULATED.3IONS-SCNC: NORMAL MMOL/L
AST SERPL-CCNC: 22 U/L
BASOPHILS ABSOLUTE: NORMAL
BASOPHILS RELATIVE PERCENT: NORMAL
BILIRUB SERPL-MCNC: 0.5 MG/DL (ref 0.1–1.4)
BUN BLDV-MCNC: 18 MG/DL
CALCIUM SERPL-MCNC: 9 MG/DL
CHLORIDE BLD-SCNC: 104 MMOL/L
CHOLESTEROL, TOTAL: 181 MG/DL
CHOLESTEROL/HDL RATIO: 1
CO2: 30 MMOL/L
CREAT SERPL-MCNC: 1.39 MG/DL
EGFR: 36
EOSINOPHILS ABSOLUTE: NORMAL
EOSINOPHILS RELATIVE PERCENT: NORMAL
GLUCOSE BLD-MCNC: 93 MG/DL
HCT VFR BLD CALC: 39.8 % (ref 36–46)
HDLC SERPL-MCNC: 75 MG/DL (ref 35–70)
HEMOGLOBIN: 13.4 G/DL (ref 12–16)
LDL CHOLESTEROL CALCULATED: 75 MG/DL (ref 0–160)
LYMPHOCYTES ABSOLUTE: NORMAL
LYMPHOCYTES RELATIVE PERCENT: NORMAL
MCH RBC QN AUTO: 34.4 PG
MCHC RBC AUTO-ENTMCNC: 33.7 G/DL
MCV RBC AUTO: 102.3 FL
MONOCYTES ABSOLUTE: NORMAL
MONOCYTES RELATIVE PERCENT: NORMAL
NEUTROPHILS ABSOLUTE: NORMAL
NEUTROPHILS RELATIVE PERCENT: NORMAL
NONHDLC SERPL-MCNC: ABNORMAL MG/DL
PDW BLD-RTO: 12.3 %
PLATELET # BLD: 237 K/ΜL
PMV BLD AUTO: 10.5 FL
POTASSIUM SERPL-SCNC: 4.3 MMOL/L
RBC # BLD: 3.89 10^6/ΜL
SODIUM BLD-SCNC: 138 MMOL/L
TOTAL PROTEIN: 7.5
TRIGL SERPL-MCNC: 153 MG/DL
TSH SERPL DL<=0.05 MIU/L-ACNC: 3.05 UIU/ML
VITAMIN B-12: 272
VLDLC SERPL CALC-MCNC: 31 MG/DL
WBC # BLD: 11.3 10^3/ML

## 2022-11-30 RX ORDER — METOPROLOL SUCCINATE 100 MG/1
100 TABLET, EXTENDED RELEASE ORAL 2 TIMES DAILY
Qty: 180 TABLET | Refills: 3 | Status: SHIPPED | OUTPATIENT
Start: 2022-11-30

## 2022-11-30 RX ORDER — METOPROLOL SUCCINATE 100 MG/1
100 TABLET, EXTENDED RELEASE ORAL 2 TIMES DAILY
COMMUNITY
End: 2022-11-30 | Stop reason: SDUPTHER

## 2022-12-06 RX ORDER — DOFETILIDE 0.12 MG/1
125 CAPSULE ORAL EVERY 12 HOURS SCHEDULED
Qty: 180 CAPSULE | Refills: 1 | Status: SHIPPED | OUTPATIENT
Start: 2022-12-06

## 2023-01-16 RX ORDER — DOFETILIDE 0.12 MG/1
125 CAPSULE ORAL EVERY 12 HOURS SCHEDULED
Qty: 60 CAPSULE | Refills: 0 | Status: SHIPPED | OUTPATIENT
Start: 2023-01-16

## 2023-02-27 NOTE — PROGRESS NOTES
Cardiac Electrophysiology Outpatient Progress Note    Anastasiya Cedeno  1935  Date of Service: 3/2/2023  Referring Provider/PCP: Robert Londono   Cardiologist: Alton Stephens DO  Electrophysiologist: Marlo Lam MD    SUBJECTIVE: Anastasiya Cedeno is a 81 y.o. female with a history of nonvalvular persistent AF sp DCCV (6/13/2019 and 3/21/2022), HFpEF-recovered/nonischemic sp Jhoana Valdovinos Neal CRT-P (dual-chamber PPM DOI: 5/4/2012; CRT-P upgrade: 3/11/2019), CAD sp stent RCA (2017), HTN, CKD 3/4, spinal stenosis, and hypothyroidism. She is managed by Maia Lainez and Tom Gtz with apixaban 2.5 mg twice daily, Lasix 10 mg daily, metoprolol  mg twice daily, and Pravachol 40 mg daily. In 2012, she was reportedly diagnosed with intermittent AV block and treated with a [employer] Neal dual-chamber pacemaker. She was diagnosed with atrial fibrillation at some point prior to 2013. She reportedly was intolerant to amiodarone in the past due to elevated LFTs this was confiremed by her Daughter. In 2019, she transferred EP care from outside hospital to Dr Tom Melendez. At that time she was noted to have LVEF of 45% with RV pacing greater than 40%, which was treated with upgrade of dual-chamber pacemaker to CRT P. In June 2019, she had a recurrence of AF around this time and was treated with direct-current cardioversion and sotalol 120 mg daily. In November 2019, patient discontinued sotalol on her own. In the past, discussion of AVJ ablation was had between establish EP and patient/family and if she were to develop increased AF/AT burden with reduction in BiV pacing. She is enrolled in remote monitoring which is reported reduction in BiV pacing to 44% and in AF burden of approximately 50 to 60% with episodes of RVR. This was treated with titration of beta-blockade. On 3/21/2022, she presented with chief complaint of dyspnea. She was diagnosed with AF with RVR (121 bpm).   A direct-current cardioversion was performed, which reportedly restored sinus rhythm for a brief period of time prior to return of AF. She spontaneously converted to sinus on hospital day one yet continues to have paroxysmal of atrial fibrillation. Her EKG showed a QTc of 541 on the 22nd and on 03/23 showed a QTc of 493ms with Bi-V pacing. She was started on Tikosyn 125mcg Every 12 hours and her QTc today is 538ms. EP service is now consulted by admitting physician for evaluation management of AF. Patient denies any other complaints at this time. 03/25/22: She has completed 4 of 6 monitored doses without QTc prolongation the manual assessment of her QTc was 514ms this morning, her CrCl is  26.44mL/min, she remains AP-BiV paced and is without cardiac complaints. 3/26/22: The patient I seen in the hospital for follow up. She denies any cardiac symptoms. QTc was 536 ms by manual calculation today    6/3/22: Erika Huntley presents today for follow up. She reports feeling ongoing dizziness since discharged from the hospital which she attributes it to [address]. The patient denies any chest pain, dyspnea, palpitations, syncope, orthopnea or paroxysmal nocturnal dyspnea. She offers no complaints from a device POV. The device site looks well healed and free from infection or erosion. The patient continues to be followed remotely. Device interrogation today showed <1% AF burden. 3/2/23: Erika Huntley presents today for follow up. Since her last office visit her Cardizem was stopped due to ongoing dizziness suspected from orthostatic hypotension. She reports feeling overall well and offers no complaints from a device POV. The device site looks well healed and free from infection or erosion. The patient denies any chest pain, dyspnea, palpitations, dizziness, syncope, orthopnea or paroxysmal nocturnal dyspnea. The patient continues to be followed remotely. Device interrogation today showed 9.7% AF burden.  She presents today in NSR with stable Qtc. She remains on Tikosyn for rhythm control, Toprol XL for rate control and Eliquis for stroke risk reduction. Patient Active Problem List    Diagnosis Date Noted    Atrial fibrillation with rapid ventricular response ([facility] [address] 03/21/2022    Spinal stenosis of lumbar region 03/02/2020    CKD (chronic kidney disease) 03/12/2019    Biventricular cardiac pacemaker in situ     Pacemaker 03/11/2019    Paced rhythm on electrocardiogram (ECG)     Pure hypercholesterolemia     Hyperthyroidism     Chest pain 06/03/2017    Persistent atrial fibrillation ([facility] [address] 06/03/2017    Essential hypertension 06/03/2017    HLD (hyperlipidemia) 06/03/2017    Hypothyroidism 06/03/2017    Acute on chronic diastolic heart failure (HCC)        Current Outpatient Medications   Medication Sig Dispense Refill    apixaban (ELIQUIS) 2.5 MG TABS tablet Take 1 tablet by mouth 2 times daily 180 tablet 3    dofetilide (TIKOSYN) 125 MCG capsule Take 1 capsule by mouth every 12 hours 60 capsule 0    metoprolol succinate (TOPROL XL) 100 MG extended release tablet Take 1 tablet by mouth 2 times daily 180 tablet 3    pravastatin (PRAVACHOL) 40 MG tablet TAKE ONE TABLET BY MOUTH DAILY 90 tablet 3    budesonide (PULMICORT FLEXHALER) 90 MCG/ACT AEPB inhaler Inhale 2 puffs into the lungs 2 times daily 1 each 3    furosemide (LASIX) 20 MG tablet Take 0.5 tablets by mouth daily 30 tablet 3    levothyroxine (SYNTHROID) 112 MCG tablet Take 1 tablet by mouth every morning (before breakfast)      LORazepam (ATIVAN) 0.5 MG tablet TAKE ONE TABLET BY MOUTH DAILY AS NEEDED  3    Cholecalciferol (VITAMIN D PO) Take 4,000 Units by mouth daily        No current facility-administered medications for this visit. Allergies   Allergen Reactions    Acetaminophen      Other reaction(s): UPSET STOMACH AND NAUSEA    Amiodarone Other (See Comments)     Causes issues with liver enzymes.      Hydrocodone      Other reaction(s): UPSET STOMACH AND NAUSEA     ROS: Constitutional: Negative for fever, activity change and appetite change. HENT: Negative for epistaxis. Eyes: Negative for diploplia, blurred vision. Respiratory: Negative for cough, chest tightness, shortness of breath and negative for wheezing. Cardiovascular: pertinent positives in HPI  Gastrointestinal: Negative for abdominal pain and blood in stool. All other review of systems are negative     PHYSICAL EXAM:  Vitals:    03/02/23 1450   BP: 124/84   Site: Left Upper Arm   Position: Sitting   Cuff Size: Large Adult   Pulse: 78   Resp: 18   Weight: 165 lb (74.8 kg)   Height: 5' 7\" (1.702 m)     Constitutional: Oriented to person, place, and time. Well-developed and cooperative. Head: Normocephalic and atraumatic. Eyes: Conjunctivae are normal.     Cardiovascular: S1 normal, S2 normal and intact distal pulses. Normal rate and rhythm. PMI is not displaced. Pulmonary/Chest: Effort normal and bibasilar crackles. No respiratory distress. Abdominal: Soft. No tenderness. Musculoskeletal: Normal range of motion of all extremities, no muscle weakness. Neurological: Alert and oriented to person, place, and time. Gait - walker  Skin: Skin is warm and dry. No bruising, no ecchymosis and no rash noted. Extremity: No clubbing or cyanosis. No edema. Psychiatric: Normal mood and affect. Thought content normal.   Pacemaker site: stable, well healed, no evidence of erosion. Pertinent Cardiac Testing:     Stress test 6/12/20     Conclusion>         The stress and resting images show normal perfusion with an         attenuation artifact. The left ventricle is normal size. The left ventricular systolic function is hyperdynamic. Left ventricular wall motion is normal.         There is a medium sized area of mildly reduced uptake in the apical         segment of the anterior wall which is seen on the stress images as         well as the resting images.  This area thickens and moves normally and         is most consistent with attenuation artifact. Considering the quantitative measurements the study suggests a low         risk for adverse myocardial events. ** REPORT SIGNED IN OTHER VENDOR SYSTEM 06/12/2020 **                        Reported By: Antoinette Del Toro MD      Echocardiogram 1/28/20:   Conclusion>        Mild to moderate concentric left ventricular hypertrophy. LVEF is 55-60%. The diastolic function is indeterminate. There is normal LV segmental wall motion. The right ventricular systolic function is normal.        Left atrium is severely dilated. Right atrium is severely dilated. There is moderate mitral annular calcification. Mild mitral regurgitation. Moderate tricuspid regurgitation. ** REPORT SIGNED IN OTHER VENDOR SYSTEM 01/28/2020 **                       Reported By: Beba Pollock MD     Cardiac Catheterization 6/26/17:   PROCEDURE: Left heart catheterization, coronary angiography, PCI of RCA. TECHNIQUE: Right radial access was obtained with the aid of ultrasound   guidance. A glide sheath slender 6 was placed with no difficulty. Coronary   angiography was performed with a TIG catheter for the right coronary artery   and a JL3.5 for the left coronary artery. The aortic valve was not crossed. Heparin was administered and guided by ACT determination. The patient had   received only aspirin yesterday and was given 600 mg oral Plavix in the lab. A 6-Angolan JR4 guiding catheter with side holes was used for the right   coronary artery which provided excellent backup. Floppy J-wire was advanced   to the distal portion of the vessel, and the lesion was predilated with a 2.5   x 15 mm balloon and stented with an Alpine 2.75 x 18 mm stent. There were no   complications. Indication:   1. Unstable angina   2. Catheterization: AUC score 8 . Indication 4.   3. PCI: AUC score 9 Indication 11. Procedure: Left Heart Catheterization, coronary angiography, percutaneous coronary intervention to RCA   Anesthesia: Fentanyl, benadryl   Time sedation was administered: 0841. I was present in the room when sedation was administered. Procedure end time: 9185   Time spent with face to face monitoring of moderate sedation: 45 min   Cardiac cath performed via right radial approach using Slender 6 sheath. Findings:   Left main: 0% stenosis   LAD: mild, calcific disease   Circumflex: mild disease   RCA: Dominant. 80 % mid stenosis. Hemodynamics: Ao: 115/63   Interventional procedure:   1. PCI vessel: RCA. Lesion type: B. JIM III flow pre PCI. Angioplasty performed with 2.5 balloon. Stenting performed with Alpine DANE 2.75 X 18 . Result: 0% residual stenosis and JIM III distal flow. Drugs: . Anticoagulation was maintained with heparin . 600 mg Plavix load given in cath lab. Right radial sheath: pulled, TR band applied. There was good distal pulses in the RUE at the end of the procedure. Complication: None   Blood loss: 5 cc   Contrast used: 91cc   Post op diagnosis:   Unstable Angina   PCI of RCA   Plan:   DAPT   Risk profile modification. See orders     Device Interrogation: 3/2/23   Make/Model St. Neal Allure  Mode DDDR 75/120  Battery Voltage/Longevity:  1.9 years   Pacing: A: 97%  RV: 97%  LV: 97%  P wave: 2.8mV  Impedance: 460 ohms   Threshold: 0.5 V @ 0.5 ms  RV R wave: 4.9 mV  Impedance: 440 ohms   Threshold: 1.12 V @ 0.5 ms  LV R wave:  Impedance: 850 ohms   Threshold:0.87 V @ 0.5 ms  Episodes: AF burden: 9.7 %  Reprogramming included: see below  Overall device function is normal    All device programmable settings were evaluated per above and in the scanned document, along with iterative adjustments (capture thresholds) to assess and select the most appropriate final programming to provide for consistent delivery of the appropriate therapy and to verify function of the device. EKG: 3/2/23:V paced, rate: 78bpm, QRS: 88, QTc: 4 ms - see scanned cardiology    Impression:    1. Persistent atrial fibrillation  - Asymptomatic.  - [id number] of 5.  - On Toprol XL for rate control.  - Developed anorexia/elevated liver enzymes with Digoxin and Amiodarone in the past.  - Developed orthostatic hypotension with Cardizem. - Started on Sotalol 120 mg QOD on 6/10/19 and stopped Sotalol by herself in November of 2019  - S/p CV 6/13/19 and 03/21/22  - On Eliquis 2.5 mg bid for stroke risk reduction. (age 81, Wt 82.6, and fluctuation of Cr level)  - Recurrent AF with RVR and a decrease in BiV pacing 03/23/22. - Tikosyn was started on 03/23/22.  - Presents in NSR with stable QTc.  - CrCl by IBW of 28.26 mL/min based on creatinine of 1.39 on 10/27/22.  - Re-education on importance of well controlled HTN (goal BP < 130/80), adequate weight control (goal BMI of < 27), physical activity consisting of moderate cardiopulmonary exercise up to a goal of 250 min/wk, daily compliance with CPAP in treating sleep apnea, smoking cessation and limited ETOH intake. 2. CRT-P in situ  - St Neal dual chamber pacemaker implantation on 5/4/12.  - Status post CRT-P upgrade 3/11/19.  - Normal device function.  - Bi- 97%     3. Paroxysmal supraventricular tachycardia  - Possible PAT or AFL  - On Toprol XL and Tikosyn. 4. Coronary artery disease  - S/p PCI of RCA on 6/26/17.  - On Toprol XL and Pravahol. 5. Hypertension  - Well controlled. - On Lasix and Toprol XL. 6. Hyperlipidemia  - On Pravachol. 7. Hypothyroidism  - On Synthroid. 8. CKD  CrCl cannot be calculated (Patient's most recent lab result is older than the maximum 180 days allowed. ). 9. Dizziness   - Suspected from orthostatic hypotension   - Improved after discontinue Cardizem. Recommendation:    1. Normal device function. 2. Continue Tikosyn 125 mcg every 12 hours.   3. Continue Toprol  mg BID  4. Continue  Eliquis 2.5 mg BID due to fluctuation of creatinine level. 5. Obtain EKG, BMP and magnesium every 3 to 6 months. 6. Merlin remote transmission every 91 days. 7. Office follow-up in 6 months or sooner as needed. Encouraged the patient to call the office with EP concerns prior to follow-up. Thank you for allowing me to participate in their care. I have spent a total of 40 minutes with the patient and the family reviewing the above stated recommendations. And a total of >50% of that time involved face-to-face time providing counseling and/or coordination of care with the other providers, preparation for the clinic visit, reviewing records/tests, counseling/education of the patient, ordering medications/tests/procedures, coordinating care, and documenting clinical information in the EHR.       Reva Muniz MD  Cardiac Electrophysiology  [address]  [facility] [place]  3/2/23   3:03 PM

## 2023-03-02 ENCOUNTER — OFFICE VISIT (OUTPATIENT)
Dept: NON INVASIVE DIAGNOSTICS | Age: 88
End: 2023-03-02

## 2023-03-02 VITALS
DIASTOLIC BLOOD PRESSURE: 84 MMHG | SYSTOLIC BLOOD PRESSURE: 124 MMHG | HEIGHT: 67 IN | WEIGHT: 165 LBS | RESPIRATION RATE: 18 BRPM | HEART RATE: 78 BPM | BODY MASS INDEX: 25.9 KG/M2

## 2023-03-02 DIAGNOSIS — I48.19 PERSISTENT ATRIAL FIBRILLATION (HCC): Primary | ICD-10-CM

## 2023-04-26 LAB
ALBUMIN SERPL-MCNC: 3.9 G/DL
ALP BLD-CCNC: 117 U/L
ALT SERPL-CCNC: 9 U/L
ANION GAP SERPL CALCULATED.3IONS-SCNC: NORMAL MMOL/L
AST SERPL-CCNC: 19 U/L
BILIRUB SERPL-MCNC: 0.6 MG/DL (ref 0.1–1.4)
BUN BLDV-MCNC: 17 MG/DL
CALCIUM SERPL-MCNC: 9.5 MG/DL
CHLORIDE BLD-SCNC: 100 MMOL/L
CO2: 28 MMOL/L
CREAT SERPL-MCNC: 1.53 MG/DL
EGFR: 32
GLUCOSE BLD-MCNC: 130 MG/DL
POTASSIUM SERPL-SCNC: 4.6 MMOL/L
SODIUM BLD-SCNC: 134 MMOL/L
TOTAL PROTEIN: 0.6
TROPONIN: 3

## 2023-04-27 LAB
BASOPHILS ABSOLUTE: 0.1 /ΜL
BASOPHILS RELATIVE PERCENT: 1 %
EOSINOPHILS ABSOLUTE: 0.6 /ΜL
EOSINOPHILS RELATIVE PERCENT: 6 %
HCT VFR BLD CALC: 38.2 % (ref 36–46)
HEMOGLOBIN: 12.6 G/DL (ref 12–16)
LYMPHOCYTES ABSOLUTE: 2.4 /ΜL
LYMPHOCYTES RELATIVE PERCENT: 22.5 %
MCH RBC QN AUTO: 33.5 PG
MCHC RBC AUTO-ENTMCNC: 33 G/DL
MCV RBC AUTO: 101.6 FL
MONOCYTES ABSOLUTE: 1.5 /ΜL
MONOCYTES RELATIVE PERCENT: 14.2 %
NEUTROPHILS ABSOLUTE: 5.8 /ΜL
NEUTROPHILS RELATIVE PERCENT: 55.1 %
PDW BLD-RTO: 12.4 %
PLATELET # BLD: 277 K/ΜL
PMV BLD AUTO: 10.6 FL
RBC # BLD: 3.76 10^6/ΜL
WBC # BLD: 10.6 10^3/ML

## 2023-07-28 RX ORDER — DOFETILIDE 0.12 MG/1
125 CAPSULE ORAL EVERY 12 HOURS SCHEDULED
Qty: 60 CAPSULE | Refills: 1 | Status: SHIPPED | OUTPATIENT
Start: 2023-07-28

## 2023-08-21 DIAGNOSIS — Z51.81 VISIT FOR MONITORING TIKOSYN THERAPY: ICD-10-CM

## 2023-08-21 DIAGNOSIS — Z79.899 VISIT FOR MONITORING TIKOSYN THERAPY: ICD-10-CM

## 2023-08-21 DIAGNOSIS — I48.19 PERSISTENT ATRIAL FIBRILLATION (HCC): Primary | ICD-10-CM

## 2023-08-21 NOTE — PROGRESS NOTES
Patient to get labs and ekg done prior to ov on 09/01/2023 w/CK.  Orders faxed to PCP Chandrakant Granados) per patient request.     Electronically signed by Jas Coe MA on 8/21/2023 at 1:16 PM

## 2023-08-23 LAB
BUN BLDV-MCNC: 18 MG/DL (ref 9–23)
CALCIUM SERPL-MCNC: 9.5 MD/DL (ref 8.7–10.4)
CHLORIDE BLD-SCNC: 106 MMOL/L (ref 98–107)
CO2: 28 MMOL/L (ref 20–31)
CREAT SERPL-MCNC: 1.28 MG/DL (ref 0.55–1.02)
GFR AFRICAN AMERICAN: 48 ML/MIN
GFR SERPL CREATININE-BSD FRML MDRD: 40 ML/MIN/
GLUCOSE: 95 MG/DL (ref 65–99)
MAGNESIUM: 2.2 MG/DL (ref 1.6–2.6)
POTASSIUM SERPL-SCNC: 4.2 MMOL/L (ref 3.4–5.1)
SODIUM BLD-SCNC: 139 MMOL/L (ref 136–145)

## 2023-08-26 DIAGNOSIS — I48.19 PERSISTENT ATRIAL FIBRILLATION (HCC): Chronic | ICD-10-CM

## 2023-08-28 RX ORDER — PRAVASTATIN SODIUM 40 MG
TABLET ORAL
Qty: 90 TABLET | Refills: 0 | Status: SHIPPED | OUTPATIENT
Start: 2023-08-28

## 2023-09-01 ENCOUNTER — OFFICE VISIT (OUTPATIENT)
Dept: NON INVASIVE DIAGNOSTICS | Age: 88
End: 2023-09-01

## 2023-09-01 VITALS
SYSTOLIC BLOOD PRESSURE: 100 MMHG | DIASTOLIC BLOOD PRESSURE: 70 MMHG | BODY MASS INDEX: 30.84 KG/M2 | WEIGHT: 167.6 LBS | HEIGHT: 62 IN

## 2023-09-01 DIAGNOSIS — I50.33 ACUTE ON CHRONIC DIASTOLIC HEART FAILURE (HCC): ICD-10-CM

## 2023-09-01 DIAGNOSIS — Z95.0 BIVENTRICULAR CARDIAC PACEMAKER IN SITU: ICD-10-CM

## 2023-09-01 DIAGNOSIS — I48.19 PERSISTENT ATRIAL FIBRILLATION (HCC): Primary | ICD-10-CM

## 2023-09-01 RX ORDER — METOPROLOL SUCCINATE 100 MG/1
100 TABLET, EXTENDED RELEASE ORAL 2 TIMES DAILY
Qty: 180 TABLET | Refills: 3 | Status: SHIPPED | OUTPATIENT
Start: 2023-09-01

## 2023-09-01 RX ORDER — DOFETILIDE 0.12 MG/1
125 CAPSULE ORAL EVERY 12 HOURS SCHEDULED
Qty: 60 CAPSULE | Refills: 1 | Status: SHIPPED | OUTPATIENT
Start: 2023-09-01

## 2023-10-16 DIAGNOSIS — I48.19 PERSISTENT ATRIAL FIBRILLATION (HCC): ICD-10-CM

## 2023-10-16 RX ORDER — DOFETILIDE 0.12 MG/1
125 CAPSULE ORAL EVERY 12 HOURS SCHEDULED
Qty: 180 CAPSULE | Refills: 1 | Status: SHIPPED | OUTPATIENT
Start: 2023-10-16

## 2023-11-26 NOTE — PROGRESS NOTES
Cardiac Electrophysiology Outpatient Progress Note    Isabella Mancilla  1935  Date of Service: 9/1/2023  Referring Provider/PCP: Jenise Noland   Cardiologist: Mer Sandoval DO  Electrophysiologist: Johanny De Santiago MD    SUBJECTIVE: Isabella Mancilla is a 81 y.o. female with a history of nonvalvular persistent AF sp DCCV (6/13/2019 and 3/21/2022), HFpEF-recovered/nonischemic sp [place] [employer] Neal CRT-P (dual-chamber PPM DOI: 5/4/2012; CRT-P upgrade: 3/11/2019), CAD sp stent RCA (2017), HTN, CKD 3/4, spinal stenosis, and hypothyroidism. She is managed by Maia Bartholomew and Nelson Beck with apixaban 2.5 mg twice daily, Lasix 10 mg daily, metoprolol  mg twice daily, and Pravachol 40 mg daily. In 2012, she was reportedly diagnosed with intermittent AV block and treated with a [employer] Neal dual-chamber pacemaker. She was diagnosed with atrial fibrillation at some point prior to 2013. She reportedly was intolerant to amiodarone in the past due to elevated LFTs this was confiremed by her Daughter. In 2019, she transferred EP care from outside hospital to [facility], Dr Nelson Beck. At that time she was noted to have LVEF of 45% with RV pacing greater than 40%, which was treated with upgrade of dual-chamber pacemaker to CRT P. In June 2019, she had a recurrence of AF around this time and was treated with direct-current cardioversion and sotalol 120 mg daily. In November 2019, patient discontinued sotalol on her own. In the past, discussion of AVJ ablation was had between establish EP and patient/family and if she were to develop increased AF/AT burden with reduction in BiV pacing. She is enrolled in remote monitoring which is reported reduction in BiV pacing to 44% and in AF burden of approximately 50 to 60% with episodes of RVR. This was treated with titration of beta-blockade. On 3/21/2022, she presented with chief complaint of dyspnea. She was diagnosed with AF with RVR (121 bpm).   A direct-current cardioversion was L ankle/complains of pain/discomfort

## 2024-01-24 NOTE — TELEPHONE ENCOUNTER
Spoke with patient. She states she is taking Toprol  mg bid. Merdis Apo states she has not missed any doses of her medications. She is complaining shortness of breath with exertion for a couple of months. Denies weight gain or edema. I asked that she contact Dr. Tuan Smith regarding her shortness of breath. I will forward to Dr. Ivan Duckworth regarding Toprol dose.     Arturo Hernandez RN, BSN  [facility]

## 2024-04-25 DIAGNOSIS — I48.19 PERSISTENT ATRIAL FIBRILLATION (HCC): ICD-10-CM

## 2024-04-25 RX ORDER — DOFETILIDE 0.12 MG/1
125 CAPSULE ORAL EVERY 12 HOURS SCHEDULED
Qty: 180 CAPSULE | Refills: 1 | Status: SHIPPED | OUTPATIENT
Start: 2024-04-25

## 2024-04-25 NOTE — TELEPHONE ENCOUNTER
Refill of Tikosyn to [facility]   Has about a week left, made appointment for 7/26/24 with CK

## 2024-05-06 DIAGNOSIS — I48.19 PERSISTENT ATRIAL FIBRILLATION (HCC): ICD-10-CM

## 2024-05-06 RX ORDER — METOPROLOL SUCCINATE 100 MG/1
100 TABLET, EXTENDED RELEASE ORAL 2 TIMES DAILY
Qty: 180 TABLET | Refills: 3 | Status: SHIPPED | OUTPATIENT
Start: 2024-05-06

## 2024-07-26 ENCOUNTER — OFFICE VISIT (OUTPATIENT)
Dept: NON INVASIVE DIAGNOSTICS | Age: 89
End: 2024-07-26

## 2024-07-26 VITALS
DIASTOLIC BLOOD PRESSURE: 70 MMHG | BODY MASS INDEX: 28.35 KG/M2 | OXYGEN SATURATION: 94 % | HEIGHT: 63 IN | RESPIRATION RATE: 16 BRPM | HEART RATE: 75 BPM | WEIGHT: 160 LBS | SYSTOLIC BLOOD PRESSURE: 110 MMHG

## 2024-07-26 DIAGNOSIS — Z95.0 PRESENCE OF CARDIAC RESYNCHRONIZATION THERAPY PACEMAKER (CRT-P): ICD-10-CM

## 2024-07-26 DIAGNOSIS — Z79.899 VISIT FOR MONITORING TIKOSYN THERAPY: ICD-10-CM

## 2024-07-26 DIAGNOSIS — Z51.81 VISIT FOR MONITORING TIKOSYN THERAPY: ICD-10-CM

## 2024-07-26 DIAGNOSIS — I48.19 PERSISTENT ATRIAL FIBRILLATION (HCC): Primary | ICD-10-CM

## 2024-07-26 LAB
ANION GAP SERPL CALCULATED.3IONS-SCNC: 13 MMOL/L (ref 7–16)
BUN BLDV-MCNC: 20 MG/DL (ref 6–23)
CALCIUM SERPL-MCNC: 9.2 MG/DL (ref 8.6–10.2)
CHLORIDE BLD-SCNC: 101 MMOL/L (ref 98–107)
CO2: 25 MMOL/L (ref 22–29)
CREAT SERPL-MCNC: 1.3 MG/DL (ref 0.5–1)
GFR, ESTIMATED: 40 ML/MIN/1.73M2
GLUCOSE BLD-MCNC: 97 MG/DL (ref 74–99)
MAGNESIUM: 2.3 MG/DL (ref 1.6–2.6)
POTASSIUM SERPL-SCNC: 4.9 MMOL/L (ref 3.5–5)
SODIUM BLD-SCNC: 139 MMOL/L (ref 132–146)

## 2024-07-26 RX ORDER — DOFETILIDE 0.12 MG/1
125 CAPSULE ORAL EVERY 12 HOURS SCHEDULED
Qty: 180 CAPSULE | Refills: 1 | Status: SHIPPED | OUTPATIENT
Start: 2024-07-26

## 2024-07-26 RX ORDER — METOPROLOL SUCCINATE 100 MG/1
100 TABLET, EXTENDED RELEASE ORAL 2 TIMES DAILY
Qty: 180 TABLET | Refills: 3 | Status: SHIPPED | OUTPATIENT
Start: 2024-07-26

## 2024-07-26 NOTE — PROGRESS NOTES
Lab work drawn from right arm/hand. The draw was for MAG.,BMP.  Patient tolerated procedure well.     NABIL VINSON MA   
Eliquis 2.5 mg bid for stroke risk reduction. (age > 80, Wt > 60, and fluctuation of Cr level  (1.28-1.53)  - Recurrent AF with RVR and a decrease in BiV pacing 03/23/22.  - Tikosyn was started on 03/23/22.  - Presents in NSR with stable QTc.  - CrCl by IBW of 24.96 mL/min based on creatinine of 1.28 on 8/23/23  - Re-education on importance of well controlled HTN (goal BP < 130/80), adequate weight control (goal BMI of < 27), physical activity consisting of moderate cardiopulmonary exercise up to a goal of 250 min/wk, daily compliance with CPAP in treating sleep apnea, smoking cessation and limited ETOH intake.      2. CRT-P in situ  - St Neal dual chamber pacemaker implantation on 5/4/12.  - Status post CRT-P upgrade 3/11/19.  - Normal device function.  - Bi- 96%     3. Paroxysmal supraventricular tachycardia  - Possible PAT or AFL  - On Toprol XL and Tikosyn.     4. Coronary artery disease  - S/p PCI of RCA on 6/26/17.  - On Toprol XL and Pravahol.      5. Hypertension  - Well controlled.  - On Lasix and Toprol XL.     6. Hyperlipidemia  - On Pravachol.     7. Hypothyroidism  - On Synthroid.     8. CKD  CrCl cannot be calculated (Patient's most recent lab result is older than the maximum 180 days allowed.).      9. Dizziness   - Suspected from orthostatic hypotension   - Improved after discontinue Cardizem.     Recommendation:    1. Normal device function.  2. Continue Tikosyn 125 mcg every 12 hours.  3. Continue Toprol  mg BID  4. Continue  Eliquis 2.5 mg BID due to fluctuation of creatinine level.  5. Obtain EKG, BMP and magnesium every 3 to 6 months while on Tikosyn. Will obtain today.  6. Merlin remote transmission every 91 days.  7. Office follow-up in 6 months or sooner as needed. Encouraged the patient to call the office with EP concerns prior to follow-up.     Thank you for allowing me to participate in their care.     I have spent a total of 40 minutes with the patient and the family reviewing the

## 2024-07-29 ENCOUNTER — TELEPHONE (OUTPATIENT)
Dept: NON INVASIVE DIAGNOSTICS | Age: 89
End: 2024-07-29

## 2024-07-29 NOTE — TELEPHONE ENCOUNTER
Pt notified of results and verbalized understanding.  Lab results faxed to PCP.    Electronically signed by Delores Calderón MA on 7/29/2024 at 12:26 PM

## 2024-07-29 NOTE — TELEPHONE ENCOUNTER
----- Message from Nemo Mccarty MD sent at 7/29/2024  7:10 AM EDT -----  Creatinine slightly elevated. Follow with PCP. Thanks.  ----- Message -----  From: [id number] - [facility] Incoming Lab Results From [place]  Sent: 7/26/2024   5:27 PM EDT  To: Nemo Mccarty MD

## 2025-01-06 ENCOUNTER — TELEPHONE (OUTPATIENT)
Dept: NON INVASIVE DIAGNOSTICS | Age: 89
End: 2025-01-06

## 2025-01-06 DIAGNOSIS — I48.19 PERSISTENT ATRIAL FIBRILLATION (HCC): ICD-10-CM

## 2025-01-06 NOTE — TELEPHONE ENCOUNTER
----- Message from TOMY BURNS RN sent at 1/3/2025  9:15 AM EST -----  Regarding: overdue office visit  Patient on recall for 1/2025. She has a [employer] device.

## 2025-02-28 ENCOUNTER — OFFICE VISIT (OUTPATIENT)
Dept: NON INVASIVE DIAGNOSTICS | Age: 89
End: 2025-02-28

## 2025-02-28 VITALS
SYSTOLIC BLOOD PRESSURE: 136 MMHG | WEIGHT: 161 LBS | TEMPERATURE: 98 F | RESPIRATION RATE: 16 BRPM | DIASTOLIC BLOOD PRESSURE: 80 MMHG | HEART RATE: 76 BPM | HEIGHT: 61 IN | BODY MASS INDEX: 30.4 KG/M2

## 2025-02-28 DIAGNOSIS — Z79.899 VISIT FOR MONITORING TIKOSYN THERAPY: ICD-10-CM

## 2025-02-28 DIAGNOSIS — I48.19 PERSISTENT ATRIAL FIBRILLATION (HCC): Primary | ICD-10-CM

## 2025-02-28 DIAGNOSIS — Z51.81 VISIT FOR MONITORING TIKOSYN THERAPY: ICD-10-CM

## 2025-02-28 DIAGNOSIS — Z95.0 CARDIAC RESYNCHRONIZATION THERAPY PACEMAKER (CRT-P) IN PLACE: ICD-10-CM

## 2025-02-28 LAB
ANION GAP SERPL CALCULATED.3IONS-SCNC: 11 MMOL/L (ref 7–16)
BUN BLDV-MCNC: 23 MG/DL (ref 6–23)
CALCIUM SERPL-MCNC: 9.3 MG/DL (ref 8.6–10.2)
CHLORIDE BLD-SCNC: 100 MMOL/L (ref 98–107)
CO2: 27 MMOL/L (ref 22–29)
CREAT SERPL-MCNC: 1.4 MG/DL (ref 0.5–1)
GFR, ESTIMATED: 37 ML/MIN/1.73M2
GLUCOSE BLD-MCNC: 103 MG/DL (ref 74–99)
MAGNESIUM: 2.4 MG/DL (ref 1.6–2.6)
POTASSIUM SERPL-SCNC: 4.7 MMOL/L (ref 3.5–5)
SODIUM BLD-SCNC: 138 MMOL/L (ref 132–146)

## 2025-02-28 RX ORDER — DOFETILIDE 0.12 MG/1
125 CAPSULE ORAL EVERY 12 HOURS SCHEDULED
Qty: 180 CAPSULE | Refills: 1 | Status: SHIPPED | OUTPATIENT
Start: 2025-02-28

## 2025-02-28 RX ORDER — METOPROLOL SUCCINATE 100 MG/1
100 TABLET, EXTENDED RELEASE ORAL 2 TIMES DAILY
Qty: 180 TABLET | Refills: 3 | Status: SHIPPED | OUTPATIENT
Start: 2025-02-28

## 2025-02-28 NOTE — PROGRESS NOTES
Lab work drawn from  right hand.  Patient tolerated procedure well.      Marian Cruz MA.    
apnea, smoking cessation and limited ETOH intake.      2. CRT-P in situ  - St Neal dual chamber pacemaker implantation on 5/4/12.  - Status post CRT-P upgrade 3/11/19.  - Normal device function.  - Bi- 96%  - Approaching SARITHA.     3. Paroxysmal supraventricular tachycardia  - Possible PAT or AFL  - On Toprol XL and Tikosyn.     4. Coronary artery disease  - S/p PCI of RCA on 6/26/17.  - On Toprol XL and Pravahol.      5. Hypertension  - Elevated.  - On Lasix and Toprol XL.     6. Hyperlipidemia  - On Pravachol.     7. Hypothyroidism  - On Synthroid.     8. CKD  CrCl cannot be calculated (Patient's most recent lab result is older than the maximum 180 days allowed.).      9. Dizziness   - Suspected from orthostatic hypotension   - Improved after discontinue Cardizem.     Recommendation:    1. Normal device function. Approaching SARITHA.  2. Continue Tikosyn 125 mcg every 12 hours.  3. Continue Toprol  mg BID  4. Continue  Eliquis 2.5 mg BID due to fluctuation of creatinine level.  5. Obtain EKG, BMP and magnesium every 3 to 6 months while on Tikosyn. Will obtain today.  6. Remote transmission every 31 days.  7. Office follow-up in 3 months or sooner as needed. Encouraged the patient to call the office with EP concerns prior to follow-up.     I have spent a total of 40 minutes with the patient and the family reviewing the above stated recommendations.  And a total of >50% of that time involved face-to-face time providing counseling and/or coordination of care with the other providers, preparation for the clinic visit, reviewing records/tests, counseling/education of the patient, ordering medications/tests/procedures, coordinating care, and documenting clinical information in the EHR.     Thank you for allowing me to participate in their care.      Nemo Mccarty MD  Cardiac Electrophysiology  [facility]  [facility] [place]: [facility]  2/28/25   1:47 PM

## 2025-03-03 ENCOUNTER — TELEPHONE (OUTPATIENT)
Dept: NON INVASIVE DIAGNOSTICS | Age: 89
End: 2025-03-03

## 2025-03-03 NOTE — TELEPHONE ENCOUNTER
Monthly remotes scheduled.     Мария SPARKSN,RN   [facility] [place]   [facility]

## 2025-03-03 NOTE — TELEPHONE ENCOUNTER
----- Message from Dr. Nemo Mccarty MD sent at 3/3/2025  7:13 AM EST -----  Creatinine was slightly high. Follow up with PCP. Check BMP in 3 months. Thanks.  ----- Message -----  From: [id number] [facility] Incoming Lab Results From [place]  Sent: 2/28/2025   5:55 PM EST  To: Nemo Mccarty MD

## 2025-03-03 NOTE — TELEPHONE ENCOUNTER
----- Message from Dr. Nemo Mccarty MD sent at 2/28/2025  1:50 PM EST -----  I would like for her to have monthly remotes to monitor her battery life. Thanks.

## 2025-03-03 NOTE — TELEPHONE ENCOUNTER
Pt notified of results and verbalized understanding.    Electronically signed by Delores Calderón MA on 3/3/2025 at 2:49 PM

## 2025-03-27 ENCOUNTER — TELEPHONE (OUTPATIENT)
Dept: NON INVASIVE DIAGNOSTICS | Age: 89
End: 2025-03-27

## 2025-03-27 NOTE — TELEPHONE ENCOUNTER
I returned the patient's VM about a billing question. I provided the patient with the phone number to our billing department.     Electronically signed by Delores Calderón MA on 3/27/2025 at 9:39 AM

## 2025-05-27 NOTE — PROGRESS NOTES
Cardiac Electrophysiology Outpatient Progress Note    Nancy Huntley  1935  Date of Service: 5/29/2025  Referring Provider/PCP: Marino Sexton PA-C   Cardiologist: Johanna Mcdonald DO  Electrophysiologist: Nemo Mccarty MD    SUBJECTIVE: Nancy Huntley is a 89 y.o. female with a history of nonvalvular persistent AF sp DCCV (6/13/2019 and 3/21/2022), HFpEF-recovered/nonischemic sp [place] CRT-P (dual-chamber PPM DOI: 5/4/2012; CRT-P upgrade: 3/11/2019), CAD sp stent RCA (2017), HTN, CKD 3/4, spinal stenosis, and hypothyroidism.  She is managed by Maia Mcdonald and Lul with apixaban 2.5 mg twice daily, Lasix 10 mg daily, metoprolol  mg twice daily, and Pravachol 40 mg daily. In 2012, she was reportedly diagnosed with intermittent AV block and treated with a [place] dual-chamber pacemaker.  She was diagnosed with atrial fibrillation at some point prior to 2013.  She reportedly was intolerant to amiodarone in the past due to elevated LFTs this was confiremed by her Daughter.  In 2019, she transferred EP care from outside hospital to [facility], Dr Mccarty.  At that time she was noted to have LVEF of 45% with RV pacing greater than 40%, which was treated with upgrade of dual-chamber pacemaker to CRT P.  In June 2019, she had a recurrence of AF around this time and was treated with direct-current cardioversion and sotalol 120 mg daily.  In November 2019, patient discontinued sotalol on her own. In the past, discussion of AVJ ablation was had between establish EP and patient/family and if she were to develop increased AF/AT burden with reduction in BiV pacing. She is enrolled in remote monitoring which is reported reduction in BiV pacing to 44% and in AF burden of approximately 50 to 60% with episodes of RVR.  This was treated with titration of beta-blockade.  On 3/21/2022, she presented with chief complaint of dyspnea.  She was diagnosed with AF with RVR (121 bpm).  A direct-current

## 2025-05-29 ENCOUNTER — OFFICE VISIT (OUTPATIENT)
Dept: NON INVASIVE DIAGNOSTICS | Age: 89
End: 2025-05-29
Payer: MEDICARE

## 2025-05-29 VITALS
RESPIRATION RATE: 16 BRPM | HEART RATE: 98 BPM | WEIGHT: 160 LBS | HEIGHT: 62 IN | BODY MASS INDEX: 29.44 KG/M2 | DIASTOLIC BLOOD PRESSURE: 84 MMHG | SYSTOLIC BLOOD PRESSURE: 124 MMHG | TEMPERATURE: 97.5 F | OXYGEN SATURATION: 94 %

## 2025-05-29 DIAGNOSIS — Z95.0 CARDIAC RESYNCHRONIZATION THERAPY PACEMAKER (CRT-P) IN PLACE: ICD-10-CM

## 2025-05-29 DIAGNOSIS — R94.31 EKG ABNORMALITIES: ICD-10-CM

## 2025-05-29 DIAGNOSIS — Z51.81 VISIT FOR MONITORING TIKOSYN THERAPY: ICD-10-CM

## 2025-05-29 DIAGNOSIS — Z79.899 VISIT FOR MONITORING TIKOSYN THERAPY: ICD-10-CM

## 2025-05-29 DIAGNOSIS — I48.19 PERSISTENT ATRIAL FIBRILLATION (HCC): Primary | ICD-10-CM

## 2025-05-29 LAB
ANION GAP SERPL CALCULATED.3IONS-SCNC: 11 MMOL/L (ref 7–16)
BUN BLDV-MCNC: 35 MG/DL (ref 8–23)
CALCIUM SERPL-MCNC: 9.6 MG/DL (ref 8.8–10.2)
CHLORIDE BLD-SCNC: 101 MMOL/L (ref 98–107)
CO2: 27 MMOL/L (ref 22–29)
CREAT SERPL-MCNC: 1.6 MG/DL (ref 0.5–1)
GFR, ESTIMATED: 31 ML/MIN/1.73M2
GLUCOSE BLD-MCNC: 111 MG/DL (ref 74–99)
MAGNESIUM: 2.5 MG/DL (ref 1.6–2.4)
POTASSIUM SERPL-SCNC: 4.9 MMOL/L (ref 3.5–5.1)
SODIUM BLD-SCNC: 139 MMOL/L (ref 136–145)

## 2025-05-29 PROCEDURE — 93000 ELECTROCARDIOGRAM COMPLETE: CPT | Performed by: INTERNAL MEDICINE

## 2025-05-29 PROCEDURE — 36415 COLL VENOUS BLD VENIPUNCTURE: CPT | Performed by: INTERNAL MEDICINE

## 2025-05-29 PROCEDURE — 1123F ACP DISCUSS/DSCN MKR DOCD: CPT | Performed by: INTERNAL MEDICINE

## 2025-05-29 PROCEDURE — 1036F TOBACCO NON-USER: CPT | Performed by: INTERNAL MEDICINE

## 2025-05-29 PROCEDURE — G8427 DOCREV CUR MEDS BY ELIG CLIN: HCPCS | Performed by: INTERNAL MEDICINE

## 2025-05-29 PROCEDURE — 1090F PRES/ABSN URINE INCON ASSESS: CPT | Performed by: INTERNAL MEDICINE

## 2025-05-29 PROCEDURE — G8417 CALC BMI ABV UP PARAM F/U: HCPCS | Performed by: INTERNAL MEDICINE

## 2025-05-29 PROCEDURE — 99215 OFFICE O/P EST HI 40 MIN: CPT | Performed by: INTERNAL MEDICINE

## 2025-05-29 PROCEDURE — 1159F MED LIST DOCD IN RCRD: CPT | Performed by: INTERNAL MEDICINE

## 2025-05-29 NOTE — PROGRESS NOTES
Patient Labs drawn form Left arm   Labs ordered by Dr Nemo Mccarty  Patient tolerated well.   Mag., cmp  Electronically signed by NABIL VINSON MA on 5/29/2025 at 1:31 PM

## 2025-05-30 ENCOUNTER — RESULTS FOLLOW-UP (OUTPATIENT)
Dept: NON INVASIVE DIAGNOSTICS | Age: 89
End: 2025-05-30

## 2025-05-30 DIAGNOSIS — Z51.81 VISIT FOR MONITORING TIKOSYN THERAPY: ICD-10-CM

## 2025-05-30 DIAGNOSIS — R79.89 ABNORMAL BLOOD CREATININE LEVEL: ICD-10-CM

## 2025-05-30 DIAGNOSIS — I48.19 PERSISTENT ATRIAL FIBRILLATION (HCC): ICD-10-CM

## 2025-05-30 DIAGNOSIS — N18.9 CHRONIC KIDNEY DISEASE, UNSPECIFIED CKD STAGE: Primary | ICD-10-CM

## 2025-05-30 DIAGNOSIS — Z79.899 VISIT FOR MONITORING TIKOSYN THERAPY: ICD-10-CM

## 2025-05-30 NOTE — TELEPHONE ENCOUNTER
Pt notified of results and verbalized understanding.    Electronically signed by Delores Calderón MA on 5/30/2025 at 1:21 PM    Lab order placed and referral submitted.

## 2025-05-30 NOTE — TELEPHONE ENCOUNTER
----- Message from Dr. Nemo Mccarty MD sent at 5/29/2025  7:43 PM EDT -----  She needs to see nephrology ASAP. Please check BMP in 1 week. If her creatinine remains high, she may need to come off Tikosyn. Thanks.  ----- Message -----  From: [id number] - [facility] Incoming Lab Results From [place]  Sent: 5/29/2025   7:21 PM EDT  To: Nemo Mccarty MD

## 2025-06-04 ENCOUNTER — PREP FOR PROCEDURE (OUTPATIENT)
Dept: NON INVASIVE DIAGNOSTICS | Age: 89
End: 2025-06-04

## 2025-06-04 ENCOUNTER — TELEPHONE (OUTPATIENT)
Dept: NON INVASIVE DIAGNOSTICS | Age: 89
End: 2025-06-04

## 2025-06-04 DIAGNOSIS — I48.19 PERSISTENT ATRIAL FIBRILLATION (HCC): Primary | ICD-10-CM

## 2025-06-04 RX ORDER — SODIUM CHLORIDE 0.9 % (FLUSH) 0.9 %
5-40 SYRINGE (ML) INJECTION EVERY 12 HOURS SCHEDULED
OUTPATIENT
Start: 2025-06-04

## 2025-06-04 RX ORDER — SODIUM CHLORIDE 9 MG/ML
INJECTION, SOLUTION INTRAVENOUS PRN
OUTPATIENT
Start: 2025-06-04

## 2025-06-04 RX ORDER — SODIUM CHLORIDE 0.9 % (FLUSH) 0.9 %
5-40 SYRINGE (ML) INJECTION PRN
OUTPATIENT
Start: 2025-06-04

## 2025-06-04 NOTE — TELEPHONE ENCOUNTER
Please check prior auth.    Date:8/20/2025    Doc:[place]    Procedure:  CRT-P gen change - [facility]    CPT: 31888    ICD: Z45.02    Electronically signed by Delores Calderón MA on 6/4/2025 at 2:59 PM

## 2025-06-10 ENCOUNTER — TELEPHONE (OUTPATIENT)
Dept: NON INVASIVE DIAGNOSTICS | Age: 89
End: 2025-06-10

## 2025-06-10 NOTE — TELEPHONE ENCOUNTER
Pt daughter called about referral that was suppose to be sent to Dr. Henry office referred by Dr. Mccarty.  Pt daughter stated she talked to Fanny at Dr. Henry office twice and they still haven't received anything. Please advise.

## 2025-06-10 NOTE — PROGRESS NOTES
Patient is scheduled with Dr. Henry on 7/10/2025.     Electronically signed by Delores Calderón MA on 6/10/2025 at 2:31 PM

## 2025-06-10 NOTE — TELEPHONE ENCOUNTER
I notified the patient, I re-faxed the referral to Dr. Henry's office today.     Electronically signed by Delores Calderón MA on 6/10/2025 at 1:32 PM

## 2025-06-18 ENCOUNTER — HOSPITAL ENCOUNTER (OUTPATIENT)
Dept: CARDIOLOGY | Age: 89
Discharge: HOME | End: 2025-06-20
Attending: INTERNAL MEDICINE
Payer: MEDICARE

## 2025-06-18 VITALS
BODY MASS INDEX: 29.44 KG/M2 | DIASTOLIC BLOOD PRESSURE: 84 MMHG | SYSTOLIC BLOOD PRESSURE: 124 MMHG | WEIGHT: 160 LBS | HEIGHT: 62 IN

## 2025-06-18 DIAGNOSIS — R94.31 EKG ABNORMALITIES: ICD-10-CM

## 2025-06-18 LAB
ECHO AO ASC DIAM: 3.2 CM
ECHO AO ASCENDING AORTA INDEX: 1.84 CM/M2
ECHO AO SINUS VALSALVA DIAM: 3.1 CM
ECHO AO SINUS VALSALVA INDEX: 1.78 CM/M2
ECHO AO ST JNCT DIAM: 2.3 CM
ECHO AV AREA PEAK VELOCITY: 2.4 CM2
ECHO AV AREA VTI: 2.2 CM2
ECHO AV AREA/BSA PEAK VELOCITY: 1.4 CM2/M2
ECHO AV AREA/BSA VTI: 1.3 CM2/M2
ECHO AV CUSP MM: 1.9 CM
ECHO AV MEAN GRADIENT: 2 MMHG
ECHO AV MEAN VELOCITY: 0.7 M/S
ECHO AV PEAK GRADIENT: 4 MMHG
ECHO AV PEAK VELOCITY: 1.1 M/S
ECHO AV VELOCITY RATIO: 0.73
ECHO AV VTI: 21.8 CM
ECHO BSA: 1.78 M2
ECHO EST RA PRESSURE: 3 MMHG
ECHO IVC PROX: 1.8 CM
ECHO LA DIAMETER INDEX: 1.95 CM/M2
ECHO LA DIAMETER: 3.4 CM
ECHO LA VOL A-L A2C: 105 ML (ref 22–52)
ECHO LA VOL A-L A4C: 86 ML (ref 22–52)
ECHO LA VOL MOD A2C: 101 ML (ref 22–52)
ECHO LA VOL MOD A4C: 82 ML (ref 22–52)
ECHO LA VOLUME AREA LENGTH: 96 ML
ECHO LA VOLUME INDEX A-L A2C: 60 ML/M2 (ref 16–34)
ECHO LA VOLUME INDEX A-L A4C: 49 ML/M2 (ref 16–34)
ECHO LA VOLUME INDEX AREA LENGTH: 55 ML/M2 (ref 16–34)
ECHO LA VOLUME INDEX MOD A2C: 58 ML/M2 (ref 16–34)
ECHO LA VOLUME INDEX MOD A4C: 47 ML/M2 (ref 16–34)
ECHO LV CARDIAC INDEX: 2.2 L/MIN/M2
ECHO LV E' LATERAL VELOCITY: 0.05 CM/S
ECHO LV E' SEPTAL VELOCITY: 0.04 CM/S
ECHO LV EF PHYSICIAN: 65 %
ECHO LV FRACTIONAL SHORTENING: 38 % (ref 28–44)
ECHO LV INTERNAL DIMENSION DIASTOLE INDEX: 2.3 CM/M2
ECHO LV INTERNAL DIMENSION DIASTOLIC: 4 CM (ref 3.9–5.3)
ECHO LV INTERNAL DIMENSION SYSTOLIC INDEX: 1.44 CM/M2
ECHO LV INTERNAL DIMENSION SYSTOLIC: 2.5 CM
ECHO LV ISOVOLUMETRIC RELAXATION TIME (IVRT): 101.5 MS
ECHO LV IVSD: 1.2 CM (ref 0.6–0.9)
ECHO LV IVSS: 1.2 CM
ECHO LV MASS 2D: 165.5 G (ref 67–162)
ECHO LV MASS INDEX 2D: 95.1 G/M2 (ref 43–95)
ECHO LV POSTERIOR WALL DIASTOLIC: 1.2 CM (ref 0.6–0.9)
ECHO LV POSTERIOR WALL SYSTOLIC: 1.4 CM
ECHO LV RELATIVE WALL THICKNESS RATIO: 0.6
ECHO LVOT AREA: 3.1 CM2
ECHO LVOT AV VTI INDEX: 0.72
ECHO LVOT CARDIAC OUTPUT: 3.8 L/MIN
ECHO LVOT DIAM: 2 CM
ECHO LVOT MEAN GRADIENT: 1 MMHG
ECHO LVOT PEAK GRADIENT: 3 MMHG
ECHO LVOT PEAK VELOCITY: 0.8 M/S
ECHO LVOT STROKE VOLUME INDEX: 28.5 ML/M2
ECHO LVOT SV: 49.6 ML
ECHO LVOT VTI: 15.8 CM
ECHO MV "A" WAVE DURATION: 152.3 MSEC
ECHO MV A VELOCITY: 1.55 M/S
ECHO MV AREA PHT: 2.8 CM2
ECHO MV AREA VTI: 1.6 CM2
ECHO MV E DECELERATION TIME (DT): 217.1 MS
ECHO MV E VELOCITY: 1.16 M/S
ECHO MV E/A RATIO: 0.75
ECHO MV E/E' LATERAL: 2320
ECHO MV E/E' RATIO (AVERAGED): 2610
ECHO MV E/E' SEPTAL: 2900
ECHO MV LVOT VTI INDEX: 1.96
ECHO MV MAX VELOCITY: 1.3 M/S
ECHO MV MEAN GRADIENT: 3 MMHG
ECHO MV MEAN VELOCITY: 0.8 M/S
ECHO MV PEAK GRADIENT: 7 MMHG
ECHO MV PRESSURE HALF TIME (PHT): 77.2 MS
ECHO MV VTI: 30.9 CM
ECHO PULMONARY ARTERY END DIASTOLIC PRESSURE: 6 MMHG
ECHO PV MAX VELOCITY: 0.7 M/S
ECHO PV MEAN GRADIENT: 1 MMHG
ECHO PV MEAN VELOCITY: 0.4 M/S
ECHO PV PEAK GRADIENT: 2 MMHG
ECHO PV REGURGITANT MAX VELOCITY: 1.2 M/S
ECHO PV VTI: 14.9 CM
ECHO PVEIN A DURATION: 96.9 MS
ECHO PVEIN A VELOCITY: 0.1 M/S
ECHO PVEIN PEAK D VELOCITY: 0.3 M/S
ECHO PVEIN PEAK S VELOCITY: 0.5 M/S
ECHO PVEIN S/D RATIO: 1.7
ECHO RA END SYSTOLIC VOLUME APICAL 4 CHAMBER INDEX BSA: 11 ML/M2
ECHO RA VOLUME AREA LENGTH APICAL 4 CHAMBER: 63 ML
ECHO RA VOLUME: 19 ML
ECHO RIGHT VENTRICULAR SYSTOLIC PRESSURE (RVSP): 39 MMHG
ECHO RV BASAL DIMENSION: 3.9 CM
ECHO RV INTERNAL DIMENSION: 2.8 CM
ECHO RV LONGITUDINAL DIMENSION: 6.5 CM
ECHO RV MID DIMENSION: 3.2 CM
ECHO RV TAPSE: 1.4 CM (ref 1.7–?)
ECHO TV REGURGITANT MAX VELOCITY: 2.98 M/S
ECHO TV REGURGITANT PEAK GRADIENT: 35 MMHG

## 2025-06-18 PROCEDURE — 93306 TTE W/DOPPLER COMPLETE: CPT | Performed by: INTERNAL MEDICINE

## 2025-06-18 PROCEDURE — 93306 TTE W/DOPPLER COMPLETE: CPT

## 2025-07-21 ENCOUNTER — TELEPHONE (OUTPATIENT)
Dept: NON INVASIVE DIAGNOSTICS | Age: 89
End: 2025-07-21

## 2025-07-21 NOTE — TELEPHONE ENCOUNTER
I notified Nancy her device triggered SARITHA on Saturday, 7/19/2025. She is scheduled to have her new pacemaker implanted on 8/20/2025. She voiced understanding of above.    Abeba Matute RN, BSN  [facility]

## 2025-08-20 ENCOUNTER — ANESTHESIA EVENT (OUTPATIENT)
Age: 89
End: 2025-08-20
Payer: MEDICARE

## 2025-08-20 ENCOUNTER — HOSPITAL ENCOUNTER (OUTPATIENT)
Age: 89
Setting detail: OUTPATIENT SURGERY
Discharge: HOME | End: 2025-08-20
Attending: INTERNAL MEDICINE | Admitting: INTERNAL MEDICINE
Payer: MEDICARE

## 2025-08-20 ENCOUNTER — APPOINTMENT (OUTPATIENT)
Dept: GENERAL RADIOLOGY | Age: 89
End: 2025-08-20
Attending: INTERNAL MEDICINE
Payer: MEDICARE

## 2025-08-20 ENCOUNTER — ANESTHESIA (OUTPATIENT)
Age: 89
End: 2025-08-20
Payer: MEDICARE

## 2025-08-20 VITALS
DIASTOLIC BLOOD PRESSURE: 91 MMHG | OXYGEN SATURATION: 96 % | TEMPERATURE: 97.2 F | HEART RATE: 78 BPM | RESPIRATION RATE: 18 BRPM | SYSTOLIC BLOOD PRESSURE: 168 MMHG

## 2025-08-20 DIAGNOSIS — Z45.02 ENCOUNTER DUE TO AICD AT END OF BATTERY LIFE: ICD-10-CM

## 2025-08-20 PROBLEM — Z45.010: Status: ACTIVE | Noted: 2025-08-20

## 2025-08-20 LAB
ANION GAP SERPL CALCULATED.3IONS-SCNC: 14 MMOL/L (ref 7–16)
BUN SERPL-MCNC: 22 MG/DL (ref 8–23)
CALCIUM SERPL-MCNC: 9.5 MG/DL (ref 8.8–10.2)
CHLORIDE SERPL-SCNC: 101 MMOL/L (ref 98–107)
CO2 SERPL-SCNC: 23 MMOL/L (ref 22–29)
CREAT SERPL-MCNC: 1.5 MG/DL (ref 0.5–1)
ERYTHROCYTE [DISTWIDTH] IN BLOOD BY AUTOMATED COUNT: 12.4 % (ref 11.5–15)
GFR, ESTIMATED: 34 ML/MIN/1.73M2
GLUCOSE SERPL-MCNC: 110 MG/DL (ref 74–99)
HCT VFR BLD AUTO: 41.2 % (ref 34–48)
HGB BLD-MCNC: 13.5 G/DL (ref 11.5–15.5)
MCH RBC QN AUTO: 33.7 PG (ref 26–35)
MCHC RBC AUTO-ENTMCNC: 32.8 G/DL (ref 32–34.5)
MCV RBC AUTO: 102.7 FL (ref 80–99.9)
PLATELET # BLD AUTO: 262 K/UL (ref 130–450)
PMV BLD AUTO: 10.6 FL (ref 7–12)
POTASSIUM SERPL-SCNC: 4.4 MMOL/L (ref 3.5–5.1)
RBC # BLD AUTO: 4.01 M/UL (ref 3.5–5.5)
SODIUM SERPL-SCNC: 138 MMOL/L (ref 136–145)
WBC OTHER # BLD: 10.1 K/UL (ref 4.5–11.5)

## 2025-08-20 PROCEDURE — 80048 BASIC METABOLIC PNL TOTAL CA: CPT

## 2025-08-20 PROCEDURE — 2720000010 HC SURG SUPPLY STERILE: Performed by: INTERNAL MEDICINE

## 2025-08-20 PROCEDURE — C1894 INTRO/SHEATH, NON-LASER: HCPCS | Performed by: INTERNAL MEDICINE

## 2025-08-20 PROCEDURE — 2580000003 HC RX 258: Performed by: INTERNAL MEDICINE

## 2025-08-20 PROCEDURE — C1889 IMPLANT/INSERT DEVICE, NOC: HCPCS | Performed by: INTERNAL MEDICINE

## 2025-08-20 PROCEDURE — 33229 REMV&REPLC PM GEN MULT LEADS: CPT | Performed by: INTERNAL MEDICINE

## 2025-08-20 PROCEDURE — 3700000001 HC ADD 15 MINUTES (ANESTHESIA): Performed by: INTERNAL MEDICINE

## 2025-08-20 PROCEDURE — 6360000002 HC RX W HCPCS

## 2025-08-20 PROCEDURE — C2621 PMKR, OTHER THAN SING/DUAL: HCPCS | Performed by: INTERNAL MEDICINE

## 2025-08-20 PROCEDURE — 3700000000 HC ANESTHESIA ATTENDED CARE: Performed by: INTERNAL MEDICINE

## 2025-08-20 PROCEDURE — 71045 X-RAY EXAM CHEST 1 VIEW: CPT

## 2025-08-20 PROCEDURE — 6360000002 HC RX W HCPCS: Performed by: INTERNAL MEDICINE

## 2025-08-20 PROCEDURE — 7100000010 HC PHASE II RECOVERY - FIRST 15 MIN: Performed by: INTERNAL MEDICINE

## 2025-08-20 PROCEDURE — 7100000011 HC PHASE II RECOVERY - ADDTL 15 MIN: Performed by: INTERNAL MEDICINE

## 2025-08-20 PROCEDURE — 2709999900 HC NON-CHARGEABLE SUPPLY: Performed by: INTERNAL MEDICINE

## 2025-08-20 PROCEDURE — 85027 COMPLETE CBC AUTOMATED: CPT

## 2025-08-20 DEVICE — ENVELOPE PACEMKR M W2.5XL2.7IN ABSRB ANTIBACT TYRX: Type: IMPLANTABLE DEVICE | Site: CHEST | Status: FUNCTIONAL

## 2025-08-20 DEVICE — IMPLANTABLE DEVICE: Type: IMPLANTABLE DEVICE | Site: CHEST  WALL | Status: FUNCTIONAL

## 2025-08-20 RX ORDER — SODIUM CHLORIDE 9 MG/ML
INJECTION, SOLUTION INTRAVENOUS PRN
Status: DISCONTINUED | OUTPATIENT
Start: 2025-08-20 | End: 2025-08-20 | Stop reason: HOSPADM

## 2025-08-20 RX ORDER — SODIUM CHLORIDE 0.9 % (FLUSH) 0.9 %
5-40 SYRINGE (ML) INJECTION PRN
Status: DISCONTINUED | OUTPATIENT
Start: 2025-08-20 | End: 2025-08-20 | Stop reason: HOSPADM

## 2025-08-20 RX ORDER — SODIUM CHLORIDE 0.9 % (FLUSH) 0.9 %
5-40 SYRINGE (ML) INJECTION EVERY 12 HOURS SCHEDULED
Status: DISCONTINUED | OUTPATIENT
Start: 2025-08-20 | End: 2025-08-20 | Stop reason: HOSPADM

## 2025-08-20 RX ORDER — LIDOCAINE HYDROCHLORIDE 20 MG/ML
INJECTION, SOLUTION INTRAVENOUS
Status: DISCONTINUED | OUTPATIENT
Start: 2025-08-20 | End: 2025-08-20 | Stop reason: SDUPTHER

## 2025-08-20 RX ORDER — FENTANYL CITRATE 50 UG/ML
INJECTION, SOLUTION INTRAMUSCULAR; INTRAVENOUS
Status: DISCONTINUED | OUTPATIENT
Start: 2025-08-20 | End: 2025-08-20 | Stop reason: SDUPTHER

## 2025-08-20 RX ORDER — DOXYCYCLINE HYCLATE 100 MG
100 TABLET ORAL 2 TIMES DAILY
Qty: 14 TABLET | Refills: 0 | Status: SHIPPED | OUTPATIENT
Start: 2025-08-20 | End: 2025-08-27

## 2025-08-20 RX ORDER — VANCOMYCIN HYDROCHLORIDE 1 G/20ML
INJECTION, POWDER, LYOPHILIZED, FOR SOLUTION INTRAVENOUS
Status: DISCONTINUED | OUTPATIENT
Start: 2025-08-20 | End: 2025-08-20 | Stop reason: SDUPTHER

## 2025-08-20 RX ORDER — ONDANSETRON 2 MG/ML
4 INJECTION INTRAMUSCULAR; INTRAVENOUS EVERY 6 HOURS PRN
Status: DISCONTINUED | OUTPATIENT
Start: 2025-08-20 | End: 2025-08-20 | Stop reason: HOSPADM

## 2025-08-20 RX ORDER — PROPOFOL 10 MG/ML
INJECTION, EMULSION INTRAVENOUS
Status: DISCONTINUED | OUTPATIENT
Start: 2025-08-20 | End: 2025-08-20 | Stop reason: SDUPTHER

## 2025-08-20 RX ORDER — CEFAZOLIN SODIUM 1 G/3ML
INJECTION, POWDER, FOR SOLUTION INTRAMUSCULAR; INTRAVENOUS
Status: DISCONTINUED | OUTPATIENT
Start: 2025-08-20 | End: 2025-08-20 | Stop reason: SDUPTHER

## 2025-08-20 RX ADMIN — PROPOFOL 20 MG: 10 INJECTION, EMULSION INTRAVENOUS at 11:58

## 2025-08-20 RX ADMIN — CEFAZOLIN 2 G: 1 INJECTION, POWDER, FOR SOLUTION INTRAMUSCULAR; INTRAVENOUS at 11:50

## 2025-08-20 RX ADMIN — PROPOFOL 20 MCG/KG/MIN: 10 INJECTION, EMULSION INTRAVENOUS at 12:00

## 2025-08-20 RX ADMIN — SODIUM CHLORIDE: 9 INJECTION, SOLUTION INTRAVENOUS at 11:35

## 2025-08-20 RX ADMIN — FENTANYL CITRATE 25 MCG: 50 INJECTION, SOLUTION INTRAMUSCULAR; INTRAVENOUS at 11:58

## 2025-08-20 RX ADMIN — LIDOCAINE HYDROCHLORIDE 40 MG: 20 INJECTION, SOLUTION INTRAVENOUS at 11:58

## 2025-08-20 RX ADMIN — VANCOMYCIN HYDROCHLORIDE 1000 MG: 1 INJECTION, POWDER, LYOPHILIZED, FOR SOLUTION INTRAVENOUS at 11:45

## 2025-08-20 ASSESSMENT — PAIN - FUNCTIONAL ASSESSMENT: PAIN_FUNCTIONAL_ASSESSMENT: 0-10

## 2025-08-20 ASSESSMENT — ENCOUNTER SYMPTOMS: SHORTNESS OF BREATH: 1

## 2025-08-21 LAB
EKG ATRIAL RATE: 78 BPM
EKG P-R INTERVAL: 148 MS
EKG Q-T INTERVAL: 476 MS
EKG QRS DURATION: 132 MS
EKG QTC CALCULATION (BAZETT): 542 MS
EKG R AXIS: 173 DEGREES
EKG T AXIS: -6 DEGREES
EKG VENTRICULAR RATE: 78 BPM

## 2025-08-28 ENCOUNTER — TELEPHONE (OUTPATIENT)
Age: 89
End: 2025-08-28

## 2025-09-04 ENCOUNTER — CLINICAL SUPPORT (OUTPATIENT)
Dept: NON INVASIVE DIAGNOSTICS | Age: 89
End: 2025-09-04

## 2025-09-04 DIAGNOSIS — Z95.0 CARDIAC PACEMAKER IN SITU: Primary | ICD-10-CM

## 2025-09-04 DIAGNOSIS — I48.19 PERSISTENT ATRIAL FIBRILLATION (HCC): ICD-10-CM

## 2025-09-04 RX ORDER — DOFETILIDE 0.12 MG/1
125 CAPSULE ORAL EVERY 12 HOURS SCHEDULED
Qty: 180 CAPSULE | Refills: 1 | Status: SHIPPED | OUTPATIENT
Start: 2025-09-04

## 2025-09-04 RX ORDER — METOPROLOL SUCCINATE 100 MG/1
100 TABLET, EXTENDED RELEASE ORAL 2 TIMES DAILY
Qty: 180 TABLET | Refills: 3 | Status: SHIPPED | OUTPATIENT
Start: 2025-09-04 | End: 2026-09-04

## (undated) DEVICE — CABLE PACE L12FT ALGTR CLP DISP FOR PACE SYS ANALZR MERLIN

## (undated) DEVICE — AGENT HEMOSTATIC SURGIFLOW MATRIX KIT W/THROMBIN

## (undated) DEVICE — PAD DEFIB AD RADIOTRANSPARENT W LD OUT

## (undated) DEVICE — SET INTRO CATH 5FR L10CM NDL 21GA L7CM 0.018IN S STL

## (undated) DEVICE — WRENCH TORQ NO2

## (undated) DEVICE — Device